# Patient Record
Sex: FEMALE | Race: WHITE | Employment: FULL TIME | URBAN - NONMETROPOLITAN AREA
[De-identification: names, ages, dates, MRNs, and addresses within clinical notes are randomized per-mention and may not be internally consistent; named-entity substitution may affect disease eponyms.]

---

## 2017-01-30 ENCOUNTER — MYC MEDICAL ADVICE (OUTPATIENT)
Dept: FAMILY MEDICINE | Facility: OTHER | Age: 32
End: 2017-01-30

## 2017-01-30 DIAGNOSIS — K25.7 CHRONIC GASTRIC ULCER: ICD-10-CM

## 2017-01-30 DIAGNOSIS — R10.13 ABDOMINAL PAIN, ACUTE, EPIGASTRIC: Primary | ICD-10-CM

## 2017-02-01 ENCOUNTER — TELEPHONE (OUTPATIENT)
Dept: FAMILY MEDICINE | Facility: OTHER | Age: 32
End: 2017-02-01

## 2017-02-01 NOTE — TELEPHONE ENCOUNTER
Left message for patient to return call to schedule EGD/colonoscopy. If Sharri or Jannie are not available, please transfer to same day surgery        Ok to schedule with Ugo

## 2017-02-03 NOTE — TELEPHONE ENCOUNTER
Patient states she is going to wait, she was seeing Dr. Johnston first. She states she will call when she is ready.

## 2017-02-24 ENCOUNTER — OFFICE VISIT (OUTPATIENT)
Dept: FAMILY MEDICINE | Facility: OTHER | Age: 32
End: 2017-02-24
Payer: COMMERCIAL

## 2017-02-24 VITALS
WEIGHT: 156 LBS | HEART RATE: 82 BPM | SYSTOLIC BLOOD PRESSURE: 112 MMHG | BODY MASS INDEX: 25.99 KG/M2 | TEMPERATURE: 98.1 F | DIASTOLIC BLOOD PRESSURE: 72 MMHG | HEIGHT: 65 IN | OXYGEN SATURATION: 100 %

## 2017-02-24 DIAGNOSIS — Z01.818 PREOP GENERAL PHYSICAL EXAM: Primary | ICD-10-CM

## 2017-02-24 DIAGNOSIS — K21.9 GASTROESOPHAGEAL REFLUX DISEASE WITHOUT ESOPHAGITIS: ICD-10-CM

## 2017-02-24 DIAGNOSIS — F32.1 MAJOR DEPRESSIVE DISORDER, SINGLE EPISODE, MODERATE (H): ICD-10-CM

## 2017-02-24 DIAGNOSIS — E03.9 ACQUIRED HYPOTHYROIDISM: ICD-10-CM

## 2017-02-24 DIAGNOSIS — R10.13 ABDOMINAL PAIN, ACUTE, EPIGASTRIC: ICD-10-CM

## 2017-02-24 PROCEDURE — 99213 OFFICE O/P EST LOW 20 MIN: CPT | Performed by: INTERNAL MEDICINE

## 2017-02-24 ASSESSMENT — PAIN SCALES - GENERAL: PAINLEVEL: NO PAIN (0)

## 2017-02-24 NOTE — LETTER
My Depression Action Plan  Name: Lida Blood   Date of Birth 1985  Date: 2/24/2017    My doctor: Jos Dinero   My clinic: [place] [address] [facility] [id number] [phone]          GREEN    ZONE   Good Control    What it looks like:     Things are going generally well. You have normal up s and down s. You may even feel depressed from time to time, but bad moods usually last less than a day.   What you need to do:  1. Continue to care for yourself (see self care plan)  2. Check your depression survival kit and update it as needed  3. Follow your physician s recommendations including any medication.  4. Do not stop taking medication unless you consult with your physician first.           YELLOW         ZONE Getting Worse    What it looks like:     Depression is starting to interfere with your life.     It may be hard to get out of bed; you may be starting to isolate yourself from others.    Symptoms of depression are starting to last most all day and this has happened for several days.     You may have suicidal thoughts but they are not constant.   What you need to do:     1. Call your care team, your response to treatment will improve if you keep your care team informed of your progress. Yellow periods are signs an adjustment may need to be made.     2. Continue your self-care, even if you have to fake it!    3. Talk to someone in your support network    4. Open up your depression survival kit           RED    ZONE Medical Alert - Get Help    What it looks like:     Depression is seriously interfering with your life.     You may experience these or other symptoms: You can t get out of bed most days, can t work or engage in other necessary activities, you have trouble taking care of basic hygiene, or basic responsibilities, thoughts of suicide or death that will not go away, self-injurious behavior.     What you need to do:  1. Call your care team  and request a same-day appointment. If they are not available (weekends or after hours) call your local crisis line, emergency room or 911.      Electronically signed by: Diane Sheikh, February 24, 2017    Depression Self Care Plan / Survival Kit    Self-Care for Depression  Here s the deal. Your body and mind are really not as separate as most people think.  What you do and think affects how you feel and how you feel influences what you do and think. This means if you do things that people who feel good do, it will help you feel better.  Sometimes this is all it takes.  There is also a place for medication and therapy depending on how severe your depression is, so be sure to consult with your medical provider and/ or Behavioral Health Consultant if your symptoms are worsening or not improving.     In order to better manage my stress, I will:    Exercise  Get some form of exercise, every day. This will help reduce pain and release endorphins, the  feel good  chemicals in your brain. This is almost as good as taking antidepressants!  This is not the same as joining a gym and then never going! (they count on that by the way ) It can be as simple as just going for a walk or doing some gardening, anything that will get you moving.      Hygiene   Maintain good hygiene (Get out of bed in the morning, Make your bed, Brush your teeth, Take a shower, and Get dressed like you were going to work, even if you are unemployed).  If your clothes don't fit try to get ones that do.    Diet  I will strive to eat foods that are good for me, drink plenty of water, and avoid excessive sugar, caffeine, alcohol, and other mood-altering substances.  Some foods that are helpful in depression are: complex carbohydrates, B vitamins, flaxseed, fish or fish oil, fresh fruits and vegetables.    Psychotherapy  I agree to participate in Individual Therapy (if recommended).    Medication  If prescribed medications, I agree to take them.  Missing  doses can result in serious side effects.  I understand that drinking alcohol, or other illicit drug use, may cause potential side effects.  I will not stop my medication abruptly without first discussing it with my provider.    Staying Connected With Others  I will stay in touch with my friends, family members, and my primary care provider/team.    Use your imagination  Be creative.  We all have a creative side; it doesn t matter if it s oil painting, sand castles, or mud pies! This will also kick up the endorphins.    Witness Beauty  (AKA stop and smell the roses) Take a look outside, even in mid-winter. Notice colors, textures. Watch the squirrels and birds.     Service to others  Be of service to others.  There is always someone else in need.  By helping others we can  get out of ourselves  and remember the really important things.  This also provides opportunities for practicing all the other parts of the program.    Humor  Laugh and be silly!  Adjust your TV habits for less news and crime-drama and more comedy.    Control your stress  Try breathing deep, massage therapy, biofeedback, and meditation. Find time to relax each day.     My support system    Clinic Contact:  Phone number:    Contact 1:  Phone number:    Contact 2:  Phone number:    [employer]/:  Phone number:    Therapist:  Phone number:    Local crisis center:    Phone number:    Other community support:  Phone number:

## 2017-02-24 NOTE — NURSING NOTE
"Chief Complaint   Patient presents with     Pre-Op Exam       Initial /72 (BP Location: Left arm, Patient Position: Chair, Cuff Size: Adult Regular)  Pulse 82  Temp 98.1  F (36.7  C) (Temporal)  Ht 5' 5.25\" (1.657 m)  Wt 156 lb (70.8 kg)  LMP 09/01/2008  SpO2 100%  BMI 25.76 kg/m2 Estimated body mass index is 25.76 kg/(m^2) as calculated from the following:    Height as of this encounter: 5' 5.25\" (1.657 m).    Weight as of this encounter: 156 lb (70.8 kg).  Medication Reconciliation: complete   Diane VÁSQUEZ      "

## 2017-02-24 NOTE — PROGRESS NOTES
[facility]  [address] [facility] [address]  [phone]  Dept: [phone]    PRE-OP EVALUATION:  Today's date: 2017    Lida Blood (: 1985) presents for pre-operative evaluation assessment as requested by Dr. Johnston.  She requires evaluation and anesthesia risk assessment prior to undergoing surgery/procedure for treatment of EGD .  Proposed procedure: COMBINED ESOPHAGOSCOPY, GASTROSCOPY, DUODENOSCOPY     Date of Surgery/ Procedure: 3/1/17  Time of Surgery/ Procedure: 11am  Hospital/Surgical Facility: [facility]    Primary Physician: Jos Dinero  Type of Anesthesia Anticipated: Local with MAC    Patient has a Health Care Directive or Living Will:  NO    Preop Questions 2017   1.  Do you have a history of heart attack, stroke, stent, bypass or surgery on an artery in the head, neck, heart or legs? No   2.  Do you ever have any pain or discomfort in your chest? No   3.  Do you have a history of  Heart Failure? No   4.   Are you troubled by shortness of breath when:  walking on a level surface, or up a slight hill, or at night? No   5.  Do you currently have a cold, bronchitis or other respiratory infection? No   6.  Do you have a cough, shortness of breath, or wheezing? No   7.  Do you sometimes get pains in the calves of your legs when you walk? No   8. Do you or anyone in your family have previous history of blood clots? No   9.  Do you or does anyone in your family have a serious bleeding problem such as prolonged bleeding following surgeries or cuts? No   10. Have you ever had problems with anemia or been told to take iron pills? No   11. Have you had any abnormal blood loss such as black, tarry or bloody stools, or abnormal vaginal bleeding? YES - has hx of bleeding ulcers   12. Have you ever had a blood transfusion? No   13. Have you or any of your relatives ever had problems with anesthesia? No   14. Do you have sleep apnea, excessive  snoring or daytime drowsiness? No   15. Do you have any prosthetic heart valves? No   16. Do you have prosthetic joints? No   17. Is there any chance that you may be pregnant? No         HPI:                                                      Brief HPI related to upcoming procedure: Patient has some persistent epigastric discomfort which is no longer responding to proton pump inhibitors. She denies any melena or hematochezia. She has decided to pursue EGD and requires preoperative examination prior to undergoing anesthesia.      See problem list for active medical problems.  Problems all longstanding and stable, except as noted/documented.  See ROS for pertinent symptoms related to these conditions.                                                                                                  .    MEDICAL HISTORY:                                                      Patient Active Problem List    Diagnosis Date Noted     Abdominal pain - acute epigastric 05/24/2013     Priority: High     Vomiting and diarrhea 05/23/2013     Priority: High     Abdominal pain, acute, epigastric 04/23/2012     Priority: High     LUQ abdominal pain 08/16/2016     Priority: Medium     Ileus (H) 08/16/2016     Priority: Medium     Dehydration 03/31/2016     Priority: Medium     Obesity 03/15/2016     Priority: Medium     Gastric ulcer 12/02/2015     Priority: Medium     Hypokalemia 12/01/2015     Priority: Medium     Hypoglycemia 12/01/2015     Priority: Medium     Gastroesophageal reflux disease without esophagitis 10/16/2015     Priority: Medium     Endometriosis 10/24/2008     Priority: Medium     Hypothyroidism 09/26/2003     Priority: Medium     Problem list name updated by automated process. Provider to review       RLS (restless legs syndrome) 04/05/2013     Migraine headache 10/14/2011     Physiological ovarian cysts 04/28/2011     CARDIOVASCULAR SCREENING; LDL GOAL LESS THAN 160 10/31/2010     Vaginal discharge 07/15/2009      Surgery follow-up examination 07/15/2009     Major depressive disorder, single episode, moderate (H)      Backache 01/22/2004     Problem list name updated by automated process. Provider to review       Anxiety state 05/08/2002     Problem list name updated by automated process. Provider to review        Past Medical History   Diagnosis Date     Endometriosis of pelvic peritoneum 10/06/08     Endometriosis of uterus 10/06/08     Admit. 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     PONV (postoperative nausea and vomiting)      S/P hysterectomy      Thyrotoxicosis without mention of goiter or other cause, without mention of thyrotoxic crisis or storm 2001     trachelectomy 05/19/09     Past Surgical History   Procedure Laterality Date     Hc create eardrum opening,gen anesth  12/29/88     Bilateral myringotomy with insertion of PE tubes     Hc tympanoplasty w/o mastoid, init/rev w/o oss chain reconst  10/03/00     Left exploratory tympanoplasty, myringoplasty, fascia grafting of the oval window, microdissection via the use of operative microscope     Hc create eardrum opening,gen anesth  12/20/90     Bilateral myringotomy with insertion of PE tubes     C appendectomy  4/18/2003     Hc laparoscopy, surgical, abdomen, peritoneum & omentum; dx w/ or w/o specimen(s)  04/18/2003     Diagnostic laparoscopy     C removal gallbladder  8/1/03     Hernia repair, incisional  08/23/2006     Laparoscopic mesh repair.     Hc colonoscopy w biopsy  1/22/2007     Hysteroscopy  02/08/08     Hc colonoscopy w/wo brush/wash  12/16/04     Hc lap,fulgurate/excise  lesions  06/16/08     Laparoscopy, ablation of the endometriosis and diagnostic cystoscopy     C supracerv abd hysterectomy  10/06/08     C removal of cervix  05/29/09     laparoscopic assisted vaginal trachelectomy     Hysterectomy, pap no longer indicated  5/29/09     Laparoscopic lysis adhesions  11/02/10     [facility]     Laparoscopy diagnostic (general) Right 06/23/11     Evaluation of abdomen/pelvis, RSO, Cysto; [facility] ugi endoscopy, simple exam  3/23/12, 11/13/14     Eye surgery       bilat orbital decompression surgery      Colonoscopy  11/25/14     Esophagoscopy, gastroscopy, duodenoscopy (egd), combined N/A 8/31/2015     Procedure: COMBINED ESOPHAGOSCOPY, GASTROSCOPY, DUODENOSCOPY (EGD), BIOPSY SINGLE OR MULTIPLE;  Surgeon: Toni Wiggins MD;  Location: [facility]     Laparoscopic revision gastroplasty to yara-en-y N/A 10/26/2015     Procedure: LAPAROSCOPIC REVISION GASTROPLASTY TO YARA-EN-Y;  Surgeon: Toni Wiggins MD;  Location: [facility]     Laparoscopic gastrectomy N/A 3/15/2016     Procedure: LAPAROSCOPIC GASTRECTOMY;  Surgeon: Jos Dinero MD;  Location: [facility]     Esophagoscopy, gastroscopy, duodenoscopy (egd), dilatation, combined N/A 4/13/2016     Procedure: COMBINED ESOPHAGOSCOPY, GASTROSCOPY, DUODENOSCOPY (EGD), DILATATION;  Surgeon: Jos Dinero MD;  Location: [facility]     Laparoscopic salpingo-oophorectomy Left 3/2015     Esophagoscopy, gastroscopy, duodenoscopy (egd), combined N/A 8/18/2016     Procedure: COMBINED ESOPHAGOSCOPY, GASTROSCOPY, DUODENOSCOPY (EGD);  Surgeon: Jaswinder Waddell MD;  Location: [facility]     Current Outpatient Prescriptions   Medication Sig Dispense Refill     OMEPRAZOLE PO Take 40 mg by mouth 2 times daily (before meals)       levothyroxine (SYNTHROID, LEVOTHROID) 200 MCG tablet Take 1 tablet (200 mcg) by mouth daily 30 tablet 1     estradiol 0.075 MG/24HR PTWK Place 1 patch onto the skin once a week 12 patch 3      "levothyroxine (SYNTHROID, LEVOTHROID) 75 MCG tablet Take 1 tablet (75 mcg) by mouth daily Take along with 200 mcg tablet daily. 90 tablet 1     CALCIUM PO Take by mouth daily       Cholecalciferol (VITAMIN D PO) Take by mouth daily       escitalopram (LEXAPRO) 20 MG tablet Take 1 tablet (20 mg) by mouth daily 90 tablet 1     [DISCONTINUED] levothyroxine (SYNTHROID, LEVOTHROID) 25 MCG tablet Take 1 tablet (25 mcg) by mouth daily 30 tablet 1     OTC products: None, except as noted above    Allergies   Allergen Reactions     No Known Drug Allergies       Latex Allergy: NO    Social History   Substance Use Topics     Smoking status: Former Smoker     Packs/day: 0.25     Years: 6.00     Types: Cigarettes     Quit date: 6/16/2015     Smokeless tobacco: Never Used      Comment: social     Alcohol use Yes      Comment: occas     History   Drug Use No       REVIEW OF SYSTEMS:                                                    C: NEGATIVE for fever, chills, change in weight  E/M: NEGATIVE for ear, mouth and throat problems  R: NEGATIVE for significant cough or SOB  CV: NEGATIVE for chest pain, palpitations or peripheral edema    EXAM:                                                    /72 (BP Location: Left arm, Patient Position: Chair, Cuff Size: Adult Regular)  Pulse 82  Temp 98.1  F (36.7  C) (Temporal)  Ht 5' 5.25\" (1.657 m)  Wt 156 lb (70.8 kg)  LMP 09/01/2008  SpO2 100%  BMI 25.76 kg/m2  GENERAL APPEARANCE: healthy, alert and no distress  HENT: ear canals and TM's normal and nose and mouth without ulcers or lesions  RESP: lungs clear to auscultation - no rales, rhonchi or wheezes  CV: regular rate and rhythm, normal S1 S2, no S3 or S4 and no murmur, click or rub   ABDOMEN: soft, nontender, no HSM or masses and bowel sounds normal  NEURO: Normal strength and tone, sensory exam grossly normal, mentation intact and speech normal    DIAGNOSTICS:                                                    No labs or EKG " "required for low risk surgery (cataract, skin procedure, breast biopsy, etc)    Recent Labs   Lab Test  11/01/16   1108  08/18/16   1454  08/18/16   0705  08/17/16   0225   03/29/16   1352  04/27/14 12/10/13   HGB  12.2  12.5  11.4*  11.6*   < >  12.6   < >   --    --    PLT  273  194  175  194   < >  436   < >   --    --    INR   --    --    --    --    --   0.95   --   1   --    NA   --    --   138  142   < >  140   < >  141   --    POTASSIUM   --    --   3.8  3.6   < >  3.9   < >  4   --    CR   --    --   0.71  0.76   < >  0.69   < >  0.7   --    A1C   --    --    --    --    --    --    --    --   5.2    < > = values in this interval not displayed.        IMPRESSION:                                                    Reason for surgery/procedure: Persistent epigastric pain requiring EGD  Diagnosis/reason for consult: Perioperative risk assessment in a 31-year-old female with:   1. Preop general physical exam      2. Abdominal pain, acute, epigastric      3. Gastroesophageal reflux disease without esophagitis      4. Acquired hypothyroidism      The proposed surgical procedure is considered LOW risk.    REVISED CARDIAC RISK INDEX  The patient has the following serious cardiovascular risks for perioperative complications such as (MI, PE, VFib and 3  AV Block):  No serious cardiac risks  INTERPRETATION: 0 risks: Class I (very low risk - 0.4% complication rate)    The patient has the following additional risks for perioperative complications:  No identified additional risks    No diagnosis found.    RECOMMENDATIONS:                                                          {IMPORTANT - Medications:[id number]::\"--Patient is to take all scheduled medications after her EGD       APPROVAL GIVEN to proceed with proposed procedure, without further diagnostic evaluation       Signed Electronically by: Eddie Tiwari DO    Copy of this evaluation report is provided to requesting physician.    Penny Preop " Guidelines some extremity

## 2017-02-24 NOTE — MR AVS SNAPSHOT
After Visit Summary   2/24/2017    Lida Blood    MRN: [id number]           Patient Information     Date Of Birth          1985        Visit Information        Provider Department      2/24/2017 8:40 AM Eddie Tiwari DO [facility]        Today's Diagnoses     Preop general physical exam    -  1    Abdominal pain, acute, epigastric        Gastroesophageal reflux disease without esophagitis        Acquired hypothyroidism        Major depressive disorder, single episode, moderate (H)          Care Instructions      Before Your Surgery      Call your surgeon if there is any change in your health. This includes signs of a cold or flu (such as a sore throat, runny nose, cough, rash or fever).    Do not smoke, drink alcohol or take over the counter medicine (unless your surgeon or primary care doctor tells you to) for the 24 hours before and after surgery.    If you take prescribed drugs: Follow your doctor s orders about which medicines to take and which to stop until after surgery.    Eating and drinking prior to surgery: follow the instructions from your surgeon    Take a shower or bath the night before surgery. Use the soap your surgeon gave you to gently clean your skin. If you do not have soap from your surgeon, use your regular soap. Do not shave or scrub the surgery site.  Wear clean pajamas and have clean sheets on your bed.         Follow-ups after your visit        Your next 10 appointments already scheduled     Mar 01, 2017   Procedure with Jan Johnston MD   [facility] ([facility])    [address] [place] [id number] [phone]              Who to contact     If you have questions or need follow up information about today's clinic visit or your schedule please contact [facility] directly at [phone].  Normal or non-critical lab and imaging results will be communicated to you by  "MyChart, letter or phone within 4 business days after the clinic has received the results. If you do not hear from us within 7 days, please contact the clinic through "[employer]t or phone. If you have a critical or abnormal lab result, we will notify you by phone as soon as possible.  Submit refill requests through [employer] or call your pharmacy and they will forward the refill request to us. Please allow 3 business days for your refill to be completed.          Additional Information About Your Visit        [employer] Information     [employer] gives you secure access to your electronic health record. If you see a primary care provider, you can also send messages to your care team and make appointments. If you have questions, please call your primary care clinic.  If you do not have a primary care provider, please call [phone] and they will assist you.        Care EveryWhere ID     This is your Care EveryWhere ID. This could be used by other organizations to access your [facility] medical records  [id number]        Your Vitals Were     Pulse Temperature Height Last Period Pulse Oximetry BMI (Body Mass Index)    82 98.1  F (36.7  C) (Temporal) 5' 5.25\" (1.657 m) 09/01/2008 100% 25.76 kg/m2       Blood Pressure from Last 3 Encounters:   02/24/17 112/72   08/18/16 110/60   08/16/16 104/60    Weight from Last 3 Encounters:   02/24/17 156 lb (70.8 kg)   08/16/16 158 lb 11.7 oz (72 kg)   05/11/16 155 lb (70.3 kg)              We Performed the Following     DEPRESSION ACTION PLAN (DAP)          Today's Medication Changes          These changes are accurate as of: 2/24/17  9:13 AM.  If you have any questions, ask your nurse or doctor.               These medicines have changed or have updated prescriptions.        Dose/Directions    * levothyroxine 75 MCG tablet   Commonly known as:  SYNTHROID/LEVOTHROID   This may have changed:  Another medication with the same name was removed. Continue taking this medication, and follow the " directions you see here.   Used for:  Acquired hypothyroidism   Changed by:  Joaquin Dudley MD        Dose:  75 mcg   Take 1 tablet (75 mcg) by mouth daily Take along with 200 mcg tablet daily.   Quantity:  90 tablet   Refills:  1       * levothyroxine 200 MCG tablet   Commonly known as:  SYNTHROID/LEVOTHROID   This may have changed:  Another medication with the same name was removed. Continue taking this medication, and follow the directions you see here.   Used for:  Acquired hypothyroidism        Dose:  200 mcg   Take 1 tablet (200 mcg) by mouth daily   Quantity:  30 tablet   Refills:  1       * Notice:  This list has 2 medication(s) that are the same as other medications prescribed for you. Read the directions carefully, and ask your doctor or other care provider to review them with you.             Primary Care Provider Office Phone # Fax #    Jos Dinero [phone]        [facility] [address] [facility] [address]  [facility] [address]        Thank you!     Thank you for choosing [facility]  for your care. Our goal is always to provide you with excellent care. Hearing back from our patients is one way we can continue to improve our services. Please take a few minutes to complete the written survey that you may receive in the mail after your visit with us. Thank you!             Your Updated Medication List - Protect others around you: Learn how to safely use, store and throw away your medicines at www.disposemymeds.org.          This list is accurate as of: 2/24/17  9:13 AM.  Always use your most recent med list.                   Brand Name Dispense Instructions for use    CALCIUM PO      Take by mouth daily       escitalopram 20 MG tablet    LEXAPRO    90 tablet    Take 1 tablet (20 mg) by mouth daily       estradiol 0.075 MG/24HR Ptwk     12 patch    Place 1 patch onto the skin once a week       * levothyroxine 75 MCG tablet    SYNTHROID/LEVOTHROID    90 tablet    Take 1  tablet (75 mcg) by mouth daily Take along with 200 mcg tablet daily.       * levothyroxine 200 MCG tablet    SYNTHROID/LEVOTHROID    30 tablet    Take 1 tablet (200 mcg) by mouth daily       OMEPRAZOLE PO      Take 40 mg by mouth 2 times daily (before meals)       VITAMIN D PO      Take by mouth daily       * Notice:  This list has 2 medication(s) that are the same as other medications prescribed for you. Read the directions carefully, and ask your doctor or other care provider to review them with you.

## 2017-02-25 ASSESSMENT — PATIENT HEALTH QUESTIONNAIRE - PHQ9: SUM OF ALL RESPONSES TO PHQ QUESTIONS 1-9: 2

## 2017-02-28 ENCOUNTER — ANESTHESIA EVENT (OUTPATIENT)
Dept: GASTROENTEROLOGY | Facility: CLINIC | Age: 32
End: 2017-02-28
Payer: COMMERCIAL

## 2017-02-28 ASSESSMENT — LIFESTYLE VARIABLES: TOBACCO_USE: 1

## 2017-02-28 NOTE — H&P (VIEW-ONLY)
[facility]  [address] [facility] [address]  [phone]  Dept: [phone]    PRE-OP EVALUATION:  Today's date: 2017    Lida Blood (: 1985) presents for pre-operative evaluation assessment as requested by Dr. Johnston.  She requires evaluation and anesthesia risk assessment prior to undergoing surgery/procedure for treatment of EGD .  Proposed procedure: COMBINED ESOPHAGOSCOPY, GASTROSCOPY, DUODENOSCOPY     Date of Surgery/ Procedure: 3/1/17  Time of Surgery/ Procedure: 11am  Hospital/Surgical Facility: [facility]    Primary Physician: Jos Dinero  Type of Anesthesia Anticipated: Local with MAC    Patient has a Health Care Directive or Living Will:  NO    Preop Questions 2017   1.  Do you have a history of heart attack, stroke, stent, bypass or surgery on an artery in the head, neck, heart or legs? No   2.  Do you ever have any pain or discomfort in your chest? No   3.  Do you have a history of  Heart Failure? No   4.   Are you troubled by shortness of breath when:  walking on a level surface, or up a slight hill, or at night? No   5.  Do you currently have a cold, bronchitis or other respiratory infection? No   6.  Do you have a cough, shortness of breath, or wheezing? No   7.  Do you sometimes get pains in the calves of your legs when you walk? No   8. Do you or anyone in your family have previous history of blood clots? No   9.  Do you or does anyone in your family have a serious bleeding problem such as prolonged bleeding following surgeries or cuts? No   10. Have you ever had problems with anemia or been told to take iron pills? No   11. Have you had any abnormal blood loss such as black, tarry or bloody stools, or abnormal vaginal bleeding? YES - has hx of bleeding ulcers   12. Have you ever had a blood transfusion? No   13. Have you or any of your relatives ever had problems with anesthesia? No   14. Do you have sleep apnea, excessive  snoring or daytime drowsiness? No   15. Do you have any prosthetic heart valves? No   16. Do you have prosthetic joints? No   17. Is there any chance that you may be pregnant? No         HPI:                                                      Brief HPI related to upcoming procedure: Patient has some persistent epigastric discomfort which is no longer responding to proton pump inhibitors. She denies any melena or hematochezia. She has decided to pursue EGD and requires preoperative examination prior to undergoing anesthesia.      See problem list for active medical problems.  Problems all longstanding and stable, except as noted/documented.  See ROS for pertinent symptoms related to these conditions.                                                                                                  .    MEDICAL HISTORY:                                                      Patient Active Problem List    Diagnosis Date Noted     Abdominal pain - acute epigastric 05/24/2013     Priority: High     Vomiting and diarrhea 05/23/2013     Priority: High     Abdominal pain, acute, epigastric 04/23/2012     Priority: High     LUQ abdominal pain 08/16/2016     Priority: Medium     Ileus (H) 08/16/2016     Priority: Medium     Dehydration 03/31/2016     Priority: Medium     Obesity 03/15/2016     Priority: Medium     Gastric ulcer 12/02/2015     Priority: Medium     Hypokalemia 12/01/2015     Priority: Medium     Hypoglycemia 12/01/2015     Priority: Medium     Gastroesophageal reflux disease without esophagitis 10/16/2015     Priority: Medium     Endometriosis 10/24/2008     Priority: Medium     Hypothyroidism 09/26/2003     Priority: Medium     Problem list name updated by automated process. Provider to review       RLS (restless legs syndrome) 04/05/2013     Migraine headache 10/14/2011     Physiological ovarian cysts 04/28/2011     CARDIOVASCULAR SCREENING; LDL GOAL LESS THAN 160 10/31/2010     Vaginal discharge 07/15/2009      Surgery follow-up examination 07/15/2009     Major depressive disorder, single episode, moderate (H)      Backache 01/22/2004     Problem list name updated by automated process. Provider to review       Anxiety state 05/08/2002     Problem list name updated by automated process. Provider to review        Past Medical History   Diagnosis Date     Endometriosis of pelvic peritoneum 10/06/08     Endometriosis of uterus 10/06/08     Admit. 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     s/p radioactive iodine ablation Rx     Major depressive disorder, single episode, moderate (H)      Migraine headache 10/14/2011     Obesity (BMI 30-39.9)      Other and unspecified ovarian cyst 03/02/10     d/c 03/04/10     Pain pelvic 12/14/04     Discharged 12/17/04-[facility]     PONV (postoperative nausea and vomiting)      S/P hysterectomy      Thyrotoxicosis without mention of goiter or other cause, without mention of thyrotoxic crisis or storm 2001     trachelectomy 05/19/09     Past Surgical History   Procedure Laterality Date     Hc create eardrum opening,gen anesth  12/29/88     Bilateral myringotomy with insertion of PE tubes     Hc tympanoplasty w/o mastoid, init/rev w/o oss chain reconst  10/03/00     Left exploratory tympanoplasty, myringoplasty, fascia grafting of the oval window, microdissection via the use of operative microscope     Hc create eardrum opening,gen anesth  12/20/90     Bilateral myringotomy with insertion of PE tubes     C appendectomy  4/18/2003     Hc laparoscopy, surgical, abdomen, peritoneum & omentum; dx w/ or w/o specimen(s)  04/18/2003     Diagnostic laparoscopy     C removal gallbladder  8/1/03     Hernia repair, incisional  08/23/2006     Laparoscopic mesh repair.     Hc colonoscopy w biopsy  1/22/2007     Hysteroscopy  02/08/08     Hc colonoscopy w/wo brush/wash  12/16/04     Hc lap,fulgurate/excise  lesions  06/16/08     Laparoscopy, ablation of the endometriosis and diagnostic cystoscopy     C supracerv abd hysterectomy  10/06/08     C removal of cervix  05/29/09     laparoscopic assisted vaginal trachelectomy     Hysterectomy, pap no longer indicated  5/29/09     Laparoscopic lysis adhesions  11/02/10     [facility]     Laparoscopy diagnostic (general) Right 06/23/11     Evaluation of abdomen/pelvis, RSO, Cysto; [facility] ugi endoscopy, simple exam  3/23/12, 11/13/14     Eye surgery       bilat orbital decompression surgery      Colonoscopy  11/25/14     Esophagoscopy, gastroscopy, duodenoscopy (egd), combined N/A 8/31/2015     Procedure: COMBINED ESOPHAGOSCOPY, GASTROSCOPY, DUODENOSCOPY (EGD), BIOPSY SINGLE OR MULTIPLE;  Surgeon: Toni Wiggins MD;  Location: [facility]     Laparoscopic revision gastroplasty to yara-en-y N/A 10/26/2015     Procedure: LAPAROSCOPIC REVISION GASTROPLASTY TO YARA-EN-Y;  Surgeon: Toni Wiggins MD;  Location: [facility]     Laparoscopic gastrectomy N/A 3/15/2016     Procedure: LAPAROSCOPIC GASTRECTOMY;  Surgeon: Jos Dinero MD;  Location: [facility]     Esophagoscopy, gastroscopy, duodenoscopy (egd), dilatation, combined N/A 4/13/2016     Procedure: COMBINED ESOPHAGOSCOPY, GASTROSCOPY, DUODENOSCOPY (EGD), DILATATION;  Surgeon: Jos Dinero MD;  Location: [facility]     Laparoscopic salpingo-oophorectomy Left 3/2015     Esophagoscopy, gastroscopy, duodenoscopy (egd), combined N/A 8/18/2016     Procedure: COMBINED ESOPHAGOSCOPY, GASTROSCOPY, DUODENOSCOPY (EGD);  Surgeon: Jaswinder Waddell MD;  Location: [facility]     Current Outpatient Prescriptions   Medication Sig Dispense Refill     OMEPRAZOLE PO Take 40 mg by mouth 2 times daily (before meals)       levothyroxine (SYNTHROID, LEVOTHROID) 200 MCG tablet Take 1 tablet (200 mcg) by mouth daily 30 tablet 1     estradiol 0.075 MG/24HR PTWK Place 1 patch onto the skin once a week 12 patch 3      "levothyroxine (SYNTHROID, LEVOTHROID) 75 MCG tablet Take 1 tablet (75 mcg) by mouth daily Take along with 200 mcg tablet daily. 90 tablet 1     CALCIUM PO Take by mouth daily       Cholecalciferol (VITAMIN D PO) Take by mouth daily       escitalopram (LEXAPRO) 20 MG tablet Take 1 tablet (20 mg) by mouth daily 90 tablet 1     [DISCONTINUED] levothyroxine (SYNTHROID, LEVOTHROID) 25 MCG tablet Take 1 tablet (25 mcg) by mouth daily 30 tablet 1     OTC products: None, except as noted above    Allergies   Allergen Reactions     No Known Drug Allergies       Latex Allergy: NO    Social History   Substance Use Topics     Smoking status: Former Smoker     Packs/day: 0.25     Years: 6.00     Types: Cigarettes     Quit date: 6/16/2015     Smokeless tobacco: Never Used      Comment: social     Alcohol use Yes      Comment: occas     History   Drug Use No       REVIEW OF SYSTEMS:                                                    C: NEGATIVE for fever, chills, change in weight  E/M: NEGATIVE for ear, mouth and throat problems  R: NEGATIVE for significant cough or SOB  CV: NEGATIVE for chest pain, palpitations or peripheral edema    EXAM:                                                    /72 (BP Location: Left arm, Patient Position: Chair, Cuff Size: Adult Regular)  Pulse 82  Temp 98.1  F (36.7  C) (Temporal)  Ht 5' 5.25\" (1.657 m)  Wt 156 lb (70.8 kg)  LMP 09/01/2008  SpO2 100%  BMI 25.76 kg/m2  GENERAL APPEARANCE: healthy, alert and no distress  HENT: ear canals and TM's normal and nose and mouth without ulcers or lesions  RESP: lungs clear to auscultation - no rales, rhonchi or wheezes  CV: regular rate and rhythm, normal S1 S2, no S3 or S4 and no murmur, click or rub   ABDOMEN: soft, nontender, no HSM or masses and bowel sounds normal  NEURO: Normal strength and tone, sensory exam grossly normal, mentation intact and speech normal    DIAGNOSTICS:                                                    No labs or EKG " "required for low risk surgery (cataract, skin procedure, breast biopsy, etc)    Recent Labs   Lab Test  11/01/16   1108  08/18/16   1454  08/18/16   0705  08/17/16   0225   03/29/16   1352  04/27/14 12/10/13   HGB  12.2  12.5  11.4*  11.6*   < >  12.6   < >   --    --    PLT  273  194  175  194   < >  436   < >   --    --    INR   --    --    --    --    --   0.95   --   1   --    NA   --    --   138  142   < >  140   < >  141   --    POTASSIUM   --    --   3.8  3.6   < >  3.9   < >  4   --    CR   --    --   0.71  0.76   < >  0.69   < >  0.7   --    A1C   --    --    --    --    --    --    --    --   5.2    < > = values in this interval not displayed.        IMPRESSION:                                                    Reason for surgery/procedure: Persistent epigastric pain requiring EGD  Diagnosis/reason for consult: Perioperative risk assessment in a 31-year-old female with:   1. Preop general physical exam      2. Abdominal pain, acute, epigastric      3. Gastroesophageal reflux disease without esophagitis      4. Acquired hypothyroidism      The proposed surgical procedure is considered LOW risk.    REVISED CARDIAC RISK INDEX  The patient has the following serious cardiovascular risks for perioperative complications such as (MI, PE, VFib and 3  AV Block):  No serious cardiac risks  INTERPRETATION: 0 risks: Class I (very low risk - 0.4% complication rate)    The patient has the following additional risks for perioperative complications:  No identified additional risks    No diagnosis found.    RECOMMENDATIONS:                                                          {IMPORTANT - Medications:[id number]::\"--Patient is to take all scheduled medications after her EGD       APPROVAL GIVEN to proceed with proposed procedure, without further diagnostic evaluation       Signed Electronically by: Eddie Tiwari DO    Copy of this evaluation report is provided to requesting physician.    Penny Preop " Guidelines some extremity

## 2017-03-01 ENCOUNTER — HOSPITAL ENCOUNTER (OUTPATIENT)
Facility: CLINIC | Age: 32
Discharge: HOME | End: 2017-03-01
Attending: SPECIALIST | Admitting: SPECIALIST
Payer: COMMERCIAL

## 2017-03-01 ENCOUNTER — ANESTHESIA (OUTPATIENT)
Dept: GASTROENTEROLOGY | Facility: CLINIC | Age: 32
End: 2017-03-01
Payer: COMMERCIAL

## 2017-03-01 VITALS
TEMPERATURE: 98 F | RESPIRATION RATE: 16 BRPM | SYSTOLIC BLOOD PRESSURE: 107 MMHG | OXYGEN SATURATION: 98 % | DIASTOLIC BLOOD PRESSURE: 70 MMHG

## 2017-03-01 DIAGNOSIS — R10.13 EPIGASTRIC PAIN: Primary | ICD-10-CM

## 2017-03-01 LAB — UPPER GI ENDOSCOPY: NORMAL

## 2017-03-01 PROCEDURE — 25000128 H RX IP 250 OP 636: Performed by: NURSE ANESTHETIST, CERTIFIED REGISTERED

## 2017-03-01 PROCEDURE — 99222 1ST HOSP IP/OBS MODERATE 55: CPT | Performed by: SPECIALIST

## 2017-03-01 PROCEDURE — 88342 IMHCHEM/IMCYTCHM 1ST ANTB: CPT | Mod: 26 | Performed by: SPECIALIST

## 2017-03-01 PROCEDURE — 25000125 ZZHC RX 250: Performed by: NURSE ANESTHETIST, CERTIFIED REGISTERED

## 2017-03-01 PROCEDURE — 25800025 ZZH RX 258: Performed by: NURSE ANESTHETIST, CERTIFIED REGISTERED

## 2017-03-01 PROCEDURE — 43239 EGD BIOPSY SINGLE/MULTIPLE: CPT | Performed by: SPECIALIST

## 2017-03-01 PROCEDURE — 88305 TISSUE EXAM BY PATHOLOGIST: CPT | Mod: 26 | Performed by: SPECIALIST

## 2017-03-01 PROCEDURE — 88342 IMHCHEM/IMCYTCHM 1ST ANTB: CPT | Performed by: SPECIALIST

## 2017-03-01 PROCEDURE — 40000296 ZZH STATISTIC ENDO RECOVERY CLASS 1:2 FIRST HOUR: Performed by: SPECIALIST

## 2017-03-01 PROCEDURE — 88305 TISSUE EXAM BY PATHOLOGIST: CPT | Performed by: SPECIALIST

## 2017-03-01 RX ORDER — SODIUM CHLORIDE, SODIUM LACTATE, POTASSIUM CHLORIDE, CALCIUM CHLORIDE 600; 310; 30; 20 MG/100ML; MG/100ML; MG/100ML; MG/100ML
INJECTION, SOLUTION INTRAVENOUS CONTINUOUS
Status: DISCONTINUED | OUTPATIENT
Start: 2017-03-01 | End: 2017-03-01 | Stop reason: HOSPADM

## 2017-03-01 RX ORDER — ONDANSETRON 2 MG/ML
4 INJECTION INTRAMUSCULAR; INTRAVENOUS EVERY 6 HOURS PRN
Status: DISCONTINUED | OUTPATIENT
Start: 2017-03-01 | End: 2017-03-01 | Stop reason: HOSPADM

## 2017-03-01 RX ORDER — PROPOFOL 10 MG/ML
INJECTION, EMULSION INTRAVENOUS PRN
Status: DISCONTINUED | OUTPATIENT
Start: 2017-03-01 | End: 2017-03-01

## 2017-03-01 RX ORDER — SODIUM CHLORIDE 9 MG/ML
INJECTION, SOLUTION INTRAVENOUS CONTINUOUS
Status: DISCONTINUED | OUTPATIENT
Start: 2017-03-01 | End: 2017-03-01 | Stop reason: HOSPADM

## 2017-03-01 RX ORDER — FLUMAZENIL 0.1 MG/ML
0.2 INJECTION, SOLUTION INTRAVENOUS
Status: DISCONTINUED | OUTPATIENT
Start: 2017-03-01 | End: 2017-03-01 | Stop reason: HOSPADM

## 2017-03-01 RX ORDER — NALOXONE HYDROCHLORIDE 0.4 MG/ML
.1-.4 INJECTION, SOLUTION INTRAMUSCULAR; INTRAVENOUS; SUBCUTANEOUS
Status: DISCONTINUED | OUTPATIENT
Start: 2017-03-01 | End: 2017-03-01 | Stop reason: HOSPADM

## 2017-03-01 RX ORDER — LIDOCAINE 40 MG/G
CREAM TOPICAL
Status: DISCONTINUED | OUTPATIENT
Start: 2017-03-01 | End: 2017-03-01 | Stop reason: HOSPADM

## 2017-03-01 RX ORDER — LIDOCAINE HYDROCHLORIDE 20 MG/ML
INJECTION, SOLUTION INFILTRATION; PERINEURAL PRN
Status: DISCONTINUED | OUTPATIENT
Start: 2017-03-01 | End: 2017-03-01

## 2017-03-01 RX ORDER — ONDANSETRON 4 MG/1
4 TABLET, ORALLY DISINTEGRATING ORAL EVERY 6 HOURS PRN
Status: DISCONTINUED | OUTPATIENT
Start: 2017-03-01 | End: 2017-03-01 | Stop reason: HOSPADM

## 2017-03-01 RX ORDER — SUCRALFATE ORAL 1 G/10ML
1 SUSPENSION ORAL 4 TIMES DAILY
Qty: 420 ML | Refills: 1 | Status: SHIPPED | OUTPATIENT
Start: 2017-03-01 | End: 2017-05-24

## 2017-03-01 RX ADMIN — SODIUM CHLORIDE, POTASSIUM CHLORIDE, SODIUM LACTATE AND CALCIUM CHLORIDE: 600; 310; 30; 20 INJECTION, SOLUTION INTRAVENOUS at 11:00

## 2017-03-01 RX ADMIN — PROPOFOL 30 MG: 10 INJECTION, EMULSION INTRAVENOUS at 11:50

## 2017-03-01 RX ADMIN — LIDOCAINE HYDROCHLORIDE 1 ML: 10 INJECTION, SOLUTION EPIDURAL; INFILTRATION; INTRACAUDAL; PERINEURAL at 11:01

## 2017-03-01 RX ADMIN — LIDOCAINE HYDROCHLORIDE 40 MG: 20 INJECTION, SOLUTION INFILTRATION; PERINEURAL at 11:45

## 2017-03-01 RX ADMIN — SODIUM CHLORIDE, POTASSIUM CHLORIDE, SODIUM LACTATE AND CALCIUM CHLORIDE: 600; 310; 30; 20 INJECTION, SOLUTION INTRAVENOUS at 12:05

## 2017-03-01 RX ADMIN — MIDAZOLAM HYDROCHLORIDE 2 MG: 1 INJECTION, SOLUTION INTRAMUSCULAR; INTRAVENOUS at 11:42

## 2017-03-01 RX ADMIN — PROPOFOL 20 MG: 10 INJECTION, EMULSION INTRAVENOUS at 11:48

## 2017-03-01 RX ADMIN — PROPOFOL 50 MG: 10 INJECTION, EMULSION INTRAVENOUS at 11:45

## 2017-03-01 NOTE — ANESTHESIA CARE TRANSFER NOTE
Patient: Lida lBood    Procedure(s):  ESOPHAGOSCOPY, GASTROSCOPY, DUODENOSCOPY (EGD) with biopsy by biopsy - Wound Class: II-Clean Contaminated    Diagnosis: reflux, abdominal pain, follow up ulcers  Diagnosis Additional Information: No value filed.    Anesthesia Type:   MAC     Note:  Airway :Face Mask  Patient transferred to:Phase II        Vitals: (Last set prior to Anesthesia Care Transfer)    CRNA VITALS  3/1/2017 1141 - 3/1/2017 1219      3/1/2017             Pulse: 82    SpO2: 93 %                Electronically Signed By: KATE Mckeon CRNA  March 1, 2017  12:19 PM

## 2017-03-01 NOTE — DISCHARGE INSTRUCTIONS
Endoscopy Discharge Instructions  Preston Barker    The procedure you have just completed was: Esophagoscopy, Gastroscopy, Duodenoscopy    TO AVOID COMPLICATIONS, TAKE SPECIAL NOTE THE ITEMS BELOW.            Call  at the [place] Office:  [phone]  if:     ? Any bleeding exceeding one tablespoon occurs.     ? If any unusual chest or abdominal pain develops.     ? You develop a fever of 101   or more.     ? You develop shortness of breath.     ? You have any further questions.      Diet: Regular     Medications: Resume Home Medications      You have a return appointment on ***       [facility]   Adult Discharge Orders & Instructions     For 24 hours after surgery    1. Get plenty of rest.  A responsible adult must stay with you for at least 24 hours after you leave the hospital.   2. Do not drive or use heavy equipment.  If you have weakness or tingling, don't drive or use heavy equipment until this feeling goes away.  3. Do not drink alcohol.  4. Avoid strenuous or risky activities.  Ask for help when climbing stairs.   5. You may feel lightheaded.  IF so, sit for a few minutes before standing.  Have someone help you get up.   6. If you have nausea (feel sick to your stomach): Drink only clear liquids such as apple juice, ginger ale, broth or 7-Up.  Rest may also help.  Be sure to drink enough fluids.  Move to a regular diet as you feel able.  7. You may have a slight fever. Call the doctor if your fever is over 100 F (37.7 C) (taken under the tongue) or lasts longer than 24 hours.  8. You may have a dry mouth, a sore throat, muscle aches or trouble sleeping.  These should go away after 24 hours.  9. Do not make important or legal decisions.   Call your doctor for any of the followin.  Signs of infection (fever, growing tenderness at the surgery site, a large amount of drainage or bleeding, severe pain, foul-smelling drainage, redness, swelling).    2. It has been over 8 to  10 hours since surgery and you are still not able to urinate (pass water).    3.  Headache for over 24 hours.    4.  Numbness, tingling or weakness the day after surgery (if you had spinal anesthesia).          Signed:__________________________ Signed: ________________________ 3/1/2017   (Patient or responsible party)    (Witness)      _________________________________________________________  Physician Signature     3/1/2017 12:24 PM

## 2017-03-01 NOTE — IP AVS SNAPSHOT
MRN:[id number]                      After Visit Summary   3/1/2017    Lida Blood    MRN: [id number]           Thank you!     Thank you for choosing [facility] for your care. Our goal is always to provide you with excellent care. Hearing back from our patients is one way we can continue to improve our services. Please take a few minutes to complete the written survey that you may receive in the mail after you visit with us. Thank you!        Patient Information     Date Of Birth          1985        About your hospital stay     You were admitted on:  March 1, 2017 You last received care in the:  [facility]    You were discharged on:  March 1, 2017       Who to Call     For medical emergencies, please call 911.  For non-urgent questions about your medical care, please call your primary care provider or clinic, [phone]  For questions related to your surgery, please call your surgery clinic        Attending Provider     Provider Specialty    Jan Johnston MD General Surgery       Primary Care Provider Office Phone # Fax #    Jos Dinero [phone]        [facility] [address] [place] [address]        After Care Instructions     Discharge Instructions       Resume pre procedure diet            Discharge Instructions       Restart home medications.                  Your next 10 appointments already scheduled     Mar 13, 2017 10:15 AM CDT   [facility] with Jan [facility]   [facility]    [address] [place] [id number] [phone]              Further instructions from your care team           Endoscopy Discharge Instructions  Preston Barker    The procedure you have just completed was: Esophagoscopy, Gastroscopy, Duodenoscopy    TO AVOID COMPLICATIONS, TAKE SPECIAL NOTE THE ITEMS BELOW.            Call  at the [place] Office:  [phone]   if:     ? Any bleeding exceeding one tablespoon occurs.     ? If any unusual chest or abdominal pain develops.     ? You develop a fever of 101   or more.     ? You develop shortness of breath.     ? You have any further questions.      Diet: Regular     Medications: Resume Home Medications      You have a return appointment on ***       [facility]   Adult Discharge Orders & Instructions     For 24 hours after surgery    1. Get plenty of rest.  A responsible adult must stay with you for at least 24 hours after you leave the hospital.   2. Do not drive or use heavy equipment.  If you have weakness or tingling, don't drive or use heavy equipment until this feeling goes away.  3. Do not drink alcohol.  4. Avoid strenuous or risky activities.  Ask for help when climbing stairs.   5. You may feel lightheaded.  IF so, sit for a few minutes before standing.  Have someone help you get up.   6. If you have nausea (feel sick to your stomach): Drink only clear liquids such as apple juice, ginger ale, broth or 7-Up.  Rest may also help.  Be sure to drink enough fluids.  Move to a regular diet as you feel able.  7. You may have a slight fever. Call the doctor if your fever is over 100 F (37.7 C) (taken under the tongue) or lasts longer than 24 hours.  8. You may have a dry mouth, a sore throat, muscle aches or trouble sleeping.  These should go away after 24 hours.  9. Do not make important or legal decisions.   Call your doctor for any of the followin.  Signs of infection (fever, growing tenderness at the surgery site, a large amount of drainage or bleeding, severe pain, foul-smelling drainage, redness, swelling).    2. It has been over 8 to 10 hours since surgery and you are still not able to urinate (pass water).    3.  Headache for over 24 hours.    4.  Numbness, tingling or weakness the day after surgery (if you had spinal anesthesia).          Signed:__________________________ Signed:  ________________________ 3/1/2017   (Patient or responsible party)    (Witness)      _________________________________________________________  Physician Signature     3/1/2017 12:24 PM           Pending Results     Date and Time Order Name Status Description    3/1/2017 1156 Surgical pathology exam In process             Admission Information     Date & Time Provider Department Dept. Phone    3/1/2017 Jan Johnston MD [facility] [phone]      Your Vitals Were     Blood Pressure Temperature Respirations Last Period Pulse Oximetry       105/66 98  F (36.7  C) (Oral) 16 09/01/2008 96%       MyChart Information     [facility] gives you secure access to your electronic health record. If you see a primary care provider, you can also send messages to your care team and make appointments. If you have questions, please call your primary care clinic.  If you do not have a primary care provider, please call [phone] and they will assist you.        Care EveryWhere ID     This is your Care EveryWhere ID. This could be used by other organizations to access your [facility] medical records  [id number]           Review of your medicines      START taking        Dose / Directions    sucralfate 1 GM/10ML suspension   Commonly known as:  CARAFATE   Used for:  Epigastric pain        Dose:  1 g   Take 10 mLs (1 g) by mouth 4 times daily   Quantity:  420 mL   Refills:  1         CONTINUE these medicines which have NOT CHANGED        Dose / Directions    CALCIUM PO        Take by mouth daily   Refills:  0       escitalopram 20 MG tablet   Commonly known as:  LEXAPRO   Used for:  Major depressive disorder, single episode, moderate (H)        Dose:  20 mg   Take 1 tablet (20 mg) by mouth daily   Quantity:  90 tablet   Refills:  1       estradiol 0.075 MG/24HR Ptwk   Used for:  Symptomatic menopausal or female climacteric states        Dose:  1 patch   Place 1 patch onto the skin once a week   Quantity:  12 patch    Refills:  3       * levothyroxine 75 MCG tablet   Commonly known as:  SYNTHROID/LEVOTHROID   Used for:  Acquired hypothyroidism        Dose:  75 mcg   Take 1 tablet (75 mcg) by mouth daily Take along with 200 mcg tablet daily.   Quantity:  90 tablet   Refills:  1       * levothyroxine 200 MCG tablet   Commonly known as:  SYNTHROID/LEVOTHROID   Used for:  Acquired hypothyroidism        Dose:  200 mcg   Take 1 tablet (200 mcg) by mouth daily   Quantity:  30 tablet   Refills:  1       OMEPRAZOLE PO        Dose:  40 mg   Take 40 mg by mouth 2 times daily (before meals)   Refills:  0       VITAMIN D PO        Take by mouth daily   Refills:  0       * Notice:  This list has 2 medication(s) that are the same as other medications prescribed for you. Read the directions carefully, and ask your doctor or other care provider to review them with you.         Where to get your medicines      Some of these will need a paper prescription and others can be bought over the counter. Ask your nurse if you have questions.     Bring a paper prescription for each of these medications     sucralfate 1 GM/10ML suspension                Protect others around you: Learn how to safely use, store and throw away your medicines at www.disposemymeds.org.             Medication List: This is a list of all your medications and when to take them. Check marks below indicate your daily home schedule. Keep this list as a reference.      Medications           Morning Afternoon Evening Bedtime As Needed    CALCIUM PO   Take by mouth daily                                escitalopram 20 MG tablet   Commonly known as:  LEXAPRO   Take 1 tablet (20 mg) by mouth daily                                estradiol 0.075 MG/24HR Ptwk   Place 1 patch onto the skin once a week                                * levothyroxine 75 MCG tablet   Commonly known as:  SYNTHROID/LEVOTHROID   Take 1 tablet (75 mcg) by mouth daily Take along with 200 mcg tablet daily.                                 * levothyroxine 200 MCG tablet   Commonly known as:  SYNTHROID/LEVOTHROID   Take 1 tablet (200 mcg) by mouth daily                                OMEPRAZOLE PO   Take 40 mg by mouth 2 times daily (before meals)                                sucralfate 1 GM/10ML suspension   Commonly known as:  CARAFATE   Take 10 mLs (1 g) by mouth 4 times daily                                VITAMIN D PO   Take by mouth daily                                * Notice:  This list has 2 medication(s) that are the same as other medications prescribed for you. Read the directions carefully, and ask your doctor or other care provider to review them with you.

## 2017-03-01 NOTE — IP AVS SNAPSHOT
[facility]    [address] [address]    Phone:  [phone]                                       After Visit Summary   3/1/2017    Lida Blood    MRN: [id number]           After Visit Summary Signature Page     I have received my discharge instructions, and my questions have been answered. I have discussed any challenges I see with this plan with the nurse or doctor.    ..........................................................................................................................................  Patient/Patient Representative Signature      ..........................................................................................................................................  Patient Representative Print Name and Relationship to Patient    ..................................................               ................................................  Date                                            Time    ..........................................................................................................................................  Reviewed by Signature/Title    ...................................................              ..............................................  Date                                                            Time

## 2017-03-01 NOTE — ANESTHESIA POSTPROCEDURE EVALUATION
Patient: Lida Blood    Procedure(s):  ESOPHAGOSCOPY, GASTROSCOPY, DUODENOSCOPY (EGD) with biopsy by biopsy - Wound Class: II-Clean Contaminated    Diagnosis:reflux, abdominal pain, follow up ulcers  Diagnosis Additional Information: No value filed.    Anesthesia Type:  MAC    Note:  Anesthesia Post Evaluation    Patient location during evaluation: Phase 2  Patient participation: Able to fully participate in evaluation  Level of consciousness: awake and alert  Pain management: adequate  Airway patency: patent  Cardiovascular status: acceptable  Respiratory status: acceptable  Hydration status: acceptable  PONV: none     Anesthetic complications: None          Last vitals:  Vitals:    03/01/17 1015   BP: 111/88   Resp: 18   Temp: 98  F (36.7  C)   SpO2: 100%         Electronically Signed By: KATE Mckeon CRNA  March 1, 2017  12:19 PM

## 2017-03-01 NOTE — ADDENDUM NOTE
Addendum  created 03/01/17 1417 by Nakia Mackay APRN CRNA    Anesthesia Review and Sign - Ready for Procedure

## 2017-03-01 NOTE — H&P
[facility] History and Physical    Lida Blood MRN# [id number]   Age: 31 year old YOB: 1985     Date of Admission:  3/1/2017    Home clinic: [facility]  Primary care provider: Jos Dinero          Impression and Plan:   Impression:   Abdominal pain, epigastric of unclear etiology        Plan:   EGD - the procedure, risks, benefits and alternatives were discussed and she agrees to proceed           Chief Complaint:   Abdominal pain, epigastric x 1 year    History is obtained from the patient          History of Present Illness:   This 31 year old female with a hx of a yara en Y gastric bypass who developed and ulcer on afferent stump several years ago.  There was associated epigastric pain.  The pain resolved after revision of the stump The pain then returned 1 year ago.  It is worse early in the am and there has been no improvement with a PPI.  She saw her bariatric surgeon who recommended she have a repeat EGD.   Denies any nausea, vomiting, fever or chills.  She now presents for an EGD.           Past Medical History:     Past Medical History   Diagnosis Date     Endometriosis of pelvic peritoneum 10/06/08     Endometriosis of uterus 10/06/08     Admit. 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     PONV (postoperative nausea and vomiting)      S/P hysterectomy      Thyrotoxicosis without mention of goiter or other cause, without mention of thyrotoxic crisis or storm 2001     trachelectomy 05/19/09            Past Surgical History:     Past Surgical History   Procedure Laterality Date      Hc create eardrum opening,gen anesth  12/29/88     Bilateral myringotomy with insertion of PE tubes     Hc tympanoplasty w/o mastoid, init/rev w/o oss chain reconst  10/03/00     Left exploratory tympanoplasty, myringoplasty, fascia grafting of the oval window, microdissection via the use of operative microscope     Hc create eardrum opening,gen anesth  12/20/90     Bilateral myringotomy with insertion of PE tubes     C appendectomy  4/18/2003      laparoscopy, surgical, abdomen, peritoneum & omentum; dx w/ or w/o specimen(s)  04/18/2003     Diagnostic laparoscopy     C removal gallbladder  8/1/03     Hernia repair, incisional  08/23/2006     Laparoscopic mesh repair.      colonoscopy w biopsy  1/22/2007     Hysteroscopy  02/08/08     Hc colonoscopy w/wo brush/wash  12/16/04      lap,fulgurate/excise lesions  06/16/08     Laparoscopy, ablation of the endometriosis and diagnostic cystoscopy     C supracerv abd hysterectomy  10/06/08     C removal of cervix  05/29/09     laparoscopic assisted vaginal trachelectomy     Hysterectomy, pap no longer indicated  5/29/09     Laparoscopic lysis adhesions  11/02/10     [facility]     Laparoscopy diagnostic (general) Right 06/23/11     Evaluation of abdomen/pelvis, RSO, Cysto; [facility] ugi endoscopy, simple exam  3/23/12, 11/13/14     Eye surgery       bilat orbital decompression surgery      Colonoscopy  11/25/14     Esophagoscopy, gastroscopy, duodenoscopy (egd), combined N/A 8/31/2015     Procedure: COMBINED ESOPHAGOSCOPY, GASTROSCOPY, DUODENOSCOPY (EGD), BIOPSY SINGLE OR MULTIPLE;  Surgeon: Toni Wiggins MD;  Location:  GI     Laparoscopic revision gastroplasty to yara-en-y N/A 10/26/2015     Procedure: LAPAROSCOPIC REVISION GASTROPLASTY TO YARA-EN-Y;  Surgeon: Toni Wiggins MD;  Location:  OR     Laparoscopic gastrectomy N/A 3/15/2016     Procedure: LAPAROSCOPIC GASTRECTOMY;  Surgeon: Jos Dinero MD;  Location:   OR     Esophagoscopy, gastroscopy, duodenoscopy (egd), dilatation, combined N/A 4/13/2016     Procedure: COMBINED ESOPHAGOSCOPY, GASTROSCOPY, DUODENOSCOPY (EGD), DILATATION;  Surgeon: Jos Dinero MD;  Location:  GI     Laparoscopic salpingo-oophorectomy Left 3/2015     Esophagoscopy, gastroscopy, duodenoscopy (egd), combined N/A 8/18/2016     Procedure: COMBINED ESOPHAGOSCOPY, GASTROSCOPY, DUODENOSCOPY (EGD);  Surgeon: Jaswinder Waddell MD;  Location:  GI            Social History:     Social History   Substance Use Topics     Smoking status: Former Smoker     Packs/day: 0.25     Years: 6.00     Types: Cigarettes     Quit date: 6/16/2015     Smokeless tobacco: Never Used      Comment: social     Alcohol use Yes      Comment: occas            Family History:     Family History   Problem Relation Age of Onset     Breast Cancer Mother      age 42 on chemo and radiation     Gynecology Mother      endometriosis     DIABETES Maternal Grandmother      type 1     Thyroid Disease Maternal Grandmother      C.A.D. Maternal Grandmother      Cardiovascular Maternal Grandmother      HEART DISEASE Maternal Grandmother      HEART DISEASE Paternal Grandmother      DIABETES Maternal Grandfather      diabetes     Thyroid Disease Maternal Aunt      HEART DISEASE Maternal Aunt      great MGA     Gynecology Sister      endometriosis     Anesthesia Reaction No family hx of             Immunizations:     VACCINE/DOSE   Diptheria   DPT   DTAP   HBIG   Hepatitis A   Hepatitis B   HIB   Influenza   Measles   Meningococcal   MMR   Mumps   Pneumococcal   Polio   Rubella   Small Pox   TDAP   Varicella   Zoster            Allergies:     Allergies   Allergen Reactions     No Known Drug Allergies             Medications:     Current Facility-Administered Medications   Medication     lidocaine 1 % 1 mL     lidocaine (LMX4) kit     sodium chloride (PF) 0.9% PF flush 3 mL     sodium chloride (PF) 0.9% PF flush 3 mL     0.9%  sodium chloride BOLUS     0.9% sodium chloride infusion     lidocaine 1 % 1 mL     lactated ringers infusion             Review of Systems:   The review of systems was positive for the following findings.  abdomen.  The remainder of the review of systems was unremarkable.          Physical Exam:   PE:  B/P: 111/88, T: 98, P: Data Unavailable, R: 18  General: well developed, well nourished WF who appears her stated age  HEENT: NC/AT, EOMI, (-)icterus, (-)injection  Neck: Supple, No JVD  Chest: CTA  Heart: S1, S2, (-)m/r/g  Abd: Soft,  non distended, minimal epigastric tenderness to deep palpation, no masses  Ext; Warm, no edema  Psych: AAOx3  Neuro: No focal deficits            Data:   All laboratory data reviewed  Results for orders placed or performed in visit on 12/29/16   TB INTRADERMAL TEST   Result Value Ref Range    PPD Induration  0 - 5 mm    PPD Redness  mm    Narrative    Negative skin test 1/2/17    Impression    Negative skin test 1/2/17     -     Jan Johnston MD, FACS

## 2017-03-01 NOTE — ANESTHESIA PREPROCEDURE EVALUATION
Anesthesia Evaluation     . Pt has had prior anesthetic. Type: General and MAC    History of anesthetic complications  - PONV    ROS/MED HX    ENT/Pulmonary:     (+)tobacco use, Past use 0.25 ppd x 6 yrs - quit 06/16/2015 packs/day  , . .    Neurologic:     (+)migraines,     Cardiovascular:  - neg cardiovascular ROS       METS/Exercise Tolerance:  >4 METS   Hematologic: Comments: Electrolytes WNL - neg hematologic  ROS       Musculoskeletal:  - neg musculoskeletal ROS (+) , , other musculoskeletal- Restless leg syndrome      GI/Hepatic:     (+) GERD Symptomatic,       Renal/Genitourinary:     (+) Other Renal/ Genitourinary, Abdominal pain with regurgitation s.p. Gastric bypass - 2012      Endo: Comment: Without goiter    (+) thyroid problem hypothyroidism, .      Psychiatric:     (+) psychiatric history anxiety and depression      Infectious Disease:  - neg infectious disease ROS       Malignancy:      - no malignancy   Other:    (+) No chance of pregnancy C-spine cleared: N/A, no H/O Chronic Pain,no other significant disability   - neg other ROS           Physical Exam  Normal systems: cardiovascular, pulmonary and dental    Airway   Mallampati: II  TM distance: >3 FB  Neck ROM: full    Dental     Cardiovascular   Rhythm and rate: regular and normal  (-) no murmur    Pulmonary    breath sounds clear to auscultation                    Anesthesia Plan      History & Physical Review  History and physical reviewed and following examination; no interval change.    ASA Status:  2 .    NPO Status:  > 8 hours    Plan for MAC with Propofol induction. Maintenance will be TIVA.  Reason for MAC:  Deep or markedly invasive procedure (G8)  PONV prophylaxis:  Ondansetron (or other 5HT-3) and Dexamethasone or Solumedrol       Postoperative Care      Consents  Anesthetic plan, risks, benefits and alternatives discussed with:  Patient.  Use of blood products discussed: No .   .                          .

## 2017-03-06 LAB — COPATH REPORT: NORMAL

## 2017-03-25 ENCOUNTER — HOSPITAL ENCOUNTER (EMERGENCY)
Facility: CLINIC | Age: 32
Discharge: ANOTHER HOSPITAL | End: 2017-03-26
Attending: FAMILY MEDICINE | Admitting: FAMILY MEDICINE
Payer: COMMERCIAL

## 2017-03-25 ENCOUNTER — APPOINTMENT (OUTPATIENT)
Dept: GENERAL RADIOLOGY | Facility: CLINIC | Age: 32
End: 2017-03-25
Attending: FAMILY MEDICINE
Payer: COMMERCIAL

## 2017-03-25 DIAGNOSIS — R10.13 ABDOMINAL PAIN, EPIGASTRIC: ICD-10-CM

## 2017-03-25 DIAGNOSIS — R10.9 INTRACTABLE ABDOMINAL PAIN: ICD-10-CM

## 2017-03-25 LAB
ALBUMIN SERPL-MCNC: 4.3 G/DL (ref 3.4–5)
ALP SERPL-CCNC: 68 U/L (ref 40–150)
ALT SERPL W P-5'-P-CCNC: 16 U/L (ref 0–50)
ANION GAP SERPL CALCULATED.3IONS-SCNC: 6 MMOL/L (ref 3–14)
AST SERPL W P-5'-P-CCNC: 17 U/L (ref 0–45)
BASOPHILS # BLD AUTO: 0.1 10E9/L (ref 0–0.2)
BASOPHILS NFR BLD AUTO: 1.1 %
BILIRUB SERPL-MCNC: 0.2 MG/DL (ref 0.2–1.3)
BUN SERPL-MCNC: 16 MG/DL (ref 7–30)
CALCIUM SERPL-MCNC: 9 MG/DL (ref 8.5–10.1)
CHLORIDE SERPL-SCNC: 106 MMOL/L (ref 94–109)
CO2 SERPL-SCNC: 32 MMOL/L (ref 20–32)
CREAT SERPL-MCNC: 0.79 MG/DL (ref 0.52–1.04)
DIFFERENTIAL METHOD BLD: NORMAL
EOSINOPHIL # BLD AUTO: 0.1 10E9/L (ref 0–0.7)
EOSINOPHIL NFR BLD AUTO: 2.5 %
ERYTHROCYTE [DISTWIDTH] IN BLOOD BY AUTOMATED COUNT: 12.8 % (ref 10–15)
GFR SERPL CREATININE-BSD FRML MDRD: 83 ML/MIN/1.7M2
GLUCOSE SERPL-MCNC: 82 MG/DL (ref 70–99)
HCT VFR BLD AUTO: 39.1 % (ref 35–47)
HGB BLD-MCNC: 13.3 G/DL (ref 11.7–15.7)
IMM GRANULOCYTES # BLD: 0 10E9/L (ref 0–0.4)
IMM GRANULOCYTES NFR BLD: 0 %
LYMPHOCYTES # BLD AUTO: 1.9 10E9/L (ref 0.8–5.3)
LYMPHOCYTES NFR BLD AUTO: 43.2 %
MCH RBC QN AUTO: 32.9 PG (ref 26.5–33)
MCHC RBC AUTO-ENTMCNC: 34 G/DL (ref 31.5–36.5)
MCV RBC AUTO: 97 FL (ref 78–100)
MONOCYTES # BLD AUTO: 0.3 10E9/L (ref 0–1.3)
MONOCYTES NFR BLD AUTO: 7.6 %
NEUTROPHILS # BLD AUTO: 2 10E9/L (ref 1.6–8.3)
NEUTROPHILS NFR BLD AUTO: 45.6 %
PLATELET # BLD AUTO: 260 10E9/L (ref 150–450)
POTASSIUM SERPL-SCNC: 3.9 MMOL/L (ref 3.4–5.3)
PROT SERPL-MCNC: 7.7 G/DL (ref 6.8–8.8)
RBC # BLD AUTO: 4.04 10E12/L (ref 3.8–5.2)
SODIUM SERPL-SCNC: 144 MMOL/L (ref 133–144)
WBC # BLD AUTO: 4.4 10E9/L (ref 4–11)

## 2017-03-25 PROCEDURE — 96375 TX/PRO/DX INJ NEW DRUG ADDON: CPT

## 2017-03-25 PROCEDURE — 96376 TX/PRO/DX INJ SAME DRUG ADON: CPT

## 2017-03-25 PROCEDURE — 96365 THER/PROPH/DIAG IV INF INIT: CPT

## 2017-03-25 PROCEDURE — 25000128 H RX IP 250 OP 636: Performed by: FAMILY MEDICINE

## 2017-03-25 PROCEDURE — 86901 BLOOD TYPING SEROLOGIC RH(D): CPT | Performed by: FAMILY MEDICINE

## 2017-03-25 PROCEDURE — 99285 EMERGENCY DEPT VISIT HI MDM: CPT | Mod: 25

## 2017-03-25 PROCEDURE — 83690 ASSAY OF LIPASE: CPT | Performed by: FAMILY MEDICINE

## 2017-03-25 PROCEDURE — 80053 COMPREHEN METABOLIC PANEL: CPT | Performed by: FAMILY MEDICINE

## 2017-03-25 PROCEDURE — 86900 BLOOD TYPING SEROLOGIC ABO: CPT | Performed by: FAMILY MEDICINE

## 2017-03-25 PROCEDURE — 86850 RBC ANTIBODY SCREEN: CPT | Performed by: FAMILY MEDICINE

## 2017-03-25 PROCEDURE — 85025 COMPLETE CBC W/AUTO DIFF WBC: CPT | Performed by: FAMILY MEDICINE

## 2017-03-25 PROCEDURE — 99285 EMERGENCY DEPT VISIT HI MDM: CPT | Performed by: FAMILY MEDICINE

## 2017-03-25 PROCEDURE — 74020 XR ABDOMEN 2 VW: CPT | Mod: TC

## 2017-03-25 PROCEDURE — 96361 HYDRATE IV INFUSION ADD-ON: CPT

## 2017-03-25 RX ORDER — HYDROMORPHONE HYDROCHLORIDE 1 MG/ML
0.5 INJECTION, SOLUTION INTRAMUSCULAR; INTRAVENOUS; SUBCUTANEOUS
Status: COMPLETED | OUTPATIENT
Start: 2017-03-25 | End: 2017-03-25

## 2017-03-25 RX ORDER — LORAZEPAM 2 MG/ML
0.5 INJECTION INTRAMUSCULAR
Status: DISCONTINUED | OUTPATIENT
Start: 2017-03-25 | End: 2017-03-26 | Stop reason: HOSPADM

## 2017-03-25 RX ORDER — ONDANSETRON 2 MG/ML
4 INJECTION INTRAMUSCULAR; INTRAVENOUS EVERY 30 MIN PRN
Status: DISCONTINUED | OUTPATIENT
Start: 2017-03-25 | End: 2017-03-26 | Stop reason: HOSPADM

## 2017-03-25 RX ORDER — LIDOCAINE 40 MG/G
CREAM TOPICAL
Status: DISCONTINUED | OUTPATIENT
Start: 2017-03-25 | End: 2017-03-26 | Stop reason: HOSPADM

## 2017-03-25 RX ORDER — HYDROMORPHONE HYDROCHLORIDE 1 MG/ML
0.5 INJECTION, SOLUTION INTRAMUSCULAR; INTRAVENOUS; SUBCUTANEOUS
Status: DISCONTINUED | OUTPATIENT
Start: 2017-03-25 | End: 2017-03-26 | Stop reason: HOSPADM

## 2017-03-25 RX ADMIN — LORAZEPAM 0.5 MG: 2 INJECTION, SOLUTION INTRAMUSCULAR; INTRAVENOUS at 22:58

## 2017-03-25 RX ADMIN — HYDROMORPHONE HYDROCHLORIDE 0.5 MG: 1 INJECTION, SOLUTION INTRAMUSCULAR; INTRAVENOUS; SUBCUTANEOUS at 22:43

## 2017-03-25 RX ADMIN — HYDROMORPHONE HYDROCHLORIDE 0.5 MG: 1 INJECTION, SOLUTION INTRAMUSCULAR; INTRAVENOUS; SUBCUTANEOUS at 22:16

## 2017-03-25 RX ADMIN — HYDROMORPHONE HYDROCHLORIDE 0.5 MG: 1 INJECTION, SOLUTION INTRAMUSCULAR; INTRAVENOUS; SUBCUTANEOUS at 23:44

## 2017-03-25 RX ADMIN — ONDANSETRON 4 MG: 2 INJECTION, SOLUTION INTRAMUSCULAR; INTRAVENOUS at 22:47

## 2017-03-25 RX ADMIN — SODIUM CHLORIDE 1000 ML: 9 INJECTION, SOLUTION INTRAVENOUS at 22:11

## 2017-03-25 RX ADMIN — HYDROMORPHONE HYDROCHLORIDE 0.5 MG: 1 INJECTION, SOLUTION INTRAMUSCULAR; INTRAVENOUS; SUBCUTANEOUS at 21:46

## 2017-03-26 ENCOUNTER — HOSPITAL ENCOUNTER (INPATIENT)
Facility: CLINIC | Age: 32
LOS: 1 days | Discharge: HOME | DRG: 392 | End: 2017-03-27
Attending: HOSPITALIST | Admitting: HOSPITALIST
Payer: COMMERCIAL

## 2017-03-26 ENCOUNTER — APPOINTMENT (OUTPATIENT)
Dept: CT IMAGING | Facility: CLINIC | Age: 32
End: 2017-03-26
Attending: FAMILY MEDICINE
Payer: COMMERCIAL

## 2017-03-26 VITALS
TEMPERATURE: 97.6 F | OXYGEN SATURATION: 97 % | SYSTOLIC BLOOD PRESSURE: 121 MMHG | BODY MASS INDEX: 25.83 KG/M2 | WEIGHT: 155 LBS | DIASTOLIC BLOOD PRESSURE: 87 MMHG | RESPIRATION RATE: 20 BRPM | HEIGHT: 65 IN

## 2017-03-26 DIAGNOSIS — R10.13 ABDOMINAL PAIN, ACUTE, EPIGASTRIC: Primary | ICD-10-CM

## 2017-03-26 LAB
ABO + RH BLD: NORMAL
ABO + RH BLD: NORMAL
BLD GP AB SCN SERPL QL: NORMAL
BLOOD BANK CMNT PATIENT-IMP: NORMAL
HEMOCCULT STL QL: NEGATIVE
LACTATE BLD-SCNC: 0.4 MMOL/L (ref 0.7–2.1)
LIPASE SERPL-CCNC: 239 U/L (ref 73–393)
SPECIMEN EXP DATE BLD: NORMAL
TROPONIN I SERPL-MCNC: 0.02 UG/L (ref 0–0.04)

## 2017-03-26 PROCEDURE — 25000128 H RX IP 250 OP 636: Performed by: INTERNAL MEDICINE

## 2017-03-26 PROCEDURE — 82272 OCCULT BLD FECES 1-3 TESTS: CPT | Performed by: FAMILY MEDICINE

## 2017-03-26 PROCEDURE — 25000132 ZZH RX MED GY IP 250 OP 250 PS 637: Performed by: HOSPITALIST

## 2017-03-26 PROCEDURE — 25900017 H RX MED GY IP 259 OP 259 PS 637: Performed by: SURGERY

## 2017-03-26 PROCEDURE — 96376 TX/PRO/DX INJ SAME DRUG ADON: CPT

## 2017-03-26 PROCEDURE — 12000001 ZZH R&B MED SURG/OB UMMC

## 2017-03-26 PROCEDURE — 93010 ELECTROCARDIOGRAM REPORT: CPT | Performed by: INTERNAL MEDICINE

## 2017-03-26 PROCEDURE — 84484 ASSAY OF TROPONIN QUANT: CPT | Performed by: HOSPITALIST

## 2017-03-26 PROCEDURE — 25000125 ZZHC RX 250: Performed by: FAMILY MEDICINE

## 2017-03-26 PROCEDURE — 25000128 H RX IP 250 OP 636: Performed by: FAMILY MEDICINE

## 2017-03-26 PROCEDURE — S0164 INJECTION PANTROPRAZOLE: HCPCS | Performed by: FAMILY MEDICINE

## 2017-03-26 PROCEDURE — 25000132 ZZH RX MED GY IP 250 OP 250 PS 637: Performed by: INTERNAL MEDICINE

## 2017-03-26 PROCEDURE — 36415 COLL VENOUS BLD VENIPUNCTURE: CPT | Performed by: HOSPITALIST

## 2017-03-26 PROCEDURE — 25000128 H RX IP 250 OP 636: Performed by: HOSPITALIST

## 2017-03-26 PROCEDURE — 93005 ELECTROCARDIOGRAM TRACING: CPT

## 2017-03-26 PROCEDURE — 99223 1ST HOSP IP/OBS HIGH 75: CPT | Mod: AI | Performed by: HOSPITALIST

## 2017-03-26 PROCEDURE — 83605 ASSAY OF LACTIC ACID: CPT | Performed by: HOSPITALIST

## 2017-03-26 PROCEDURE — 25000132 ZZH RX MED GY IP 250 OP 250 PS 637: Performed by: FAMILY MEDICINE

## 2017-03-26 PROCEDURE — S0191 MISOPROSTOL, ORAL, 200 MCG: HCPCS | Performed by: SURGERY

## 2017-03-26 PROCEDURE — 74176 CT ABD & PELVIS W/O CONTRAST: CPT

## 2017-03-26 PROCEDURE — 25000125 ZZHC RX 250: Performed by: INTERNAL MEDICINE

## 2017-03-26 RX ORDER — ONDANSETRON 2 MG/ML
4 INJECTION INTRAMUSCULAR; INTRAVENOUS EVERY 6 HOURS PRN
Status: DISCONTINUED | OUTPATIENT
Start: 2017-03-26 | End: 2017-03-27 | Stop reason: HOSPADM

## 2017-03-26 RX ORDER — MISOPROSTOL 200 UG/1
200 TABLET ORAL EVERY 6 HOURS SCHEDULED
Status: DISCONTINUED | OUTPATIENT
Start: 2017-03-26 | End: 2017-03-27 | Stop reason: HOSPADM

## 2017-03-26 RX ORDER — ACETAMINOPHEN 325 MG/1
650 TABLET ORAL EVERY 4 HOURS PRN
Status: DISCONTINUED | OUTPATIENT
Start: 2017-03-26 | End: 2017-03-27 | Stop reason: HOSPADM

## 2017-03-26 RX ORDER — HYDROMORPHONE HCL/0.9% NACL/PF 0.2MG/0.2
0.2 SYRINGE (ML) INTRAVENOUS
Status: DISCONTINUED | OUTPATIENT
Start: 2017-03-26 | End: 2017-03-27

## 2017-03-26 RX ORDER — POLYETHYLENE GLYCOL 3350 17 G/17G
17 POWDER, FOR SOLUTION ORAL DAILY PRN
Status: DISCONTINUED | OUTPATIENT
Start: 2017-03-26 | End: 2017-03-27 | Stop reason: HOSPADM

## 2017-03-26 RX ORDER — ESCITALOPRAM OXALATE 20 MG/1
20 TABLET ORAL DAILY
Status: DISCONTINUED | OUTPATIENT
Start: 2017-03-26 | End: 2017-03-27 | Stop reason: HOSPADM

## 2017-03-26 RX ORDER — HYDROXYZINE HYDROCHLORIDE 10 MG/1
10 TABLET, FILM COATED ORAL 3 TIMES DAILY PRN
Status: DISCONTINUED | OUTPATIENT
Start: 2017-03-26 | End: 2017-03-27 | Stop reason: HOSPADM

## 2017-03-26 RX ORDER — ESTRADIOL 0.07 MG/D
1 PATCH TRANSDERMAL WEEKLY
Status: DISCONTINUED | OUTPATIENT
Start: 2017-03-26 | End: 2017-03-26

## 2017-03-26 RX ORDER — AMOXICILLIN 250 MG
1-2 CAPSULE ORAL 2 TIMES DAILY
Status: DISCONTINUED | OUTPATIENT
Start: 2017-03-26 | End: 2017-03-27 | Stop reason: HOSPADM

## 2017-03-26 RX ORDER — SUCRALFATE ORAL 1 G/10ML
1 SUSPENSION ORAL
Status: DISCONTINUED | OUTPATIENT
Start: 2017-03-26 | End: 2017-03-27 | Stop reason: HOSPADM

## 2017-03-26 RX ORDER — PROCHLORPERAZINE MALEATE 5 MG
5-10 TABLET ORAL EVERY 6 HOURS PRN
Status: DISCONTINUED | OUTPATIENT
Start: 2017-03-26 | End: 2017-03-27 | Stop reason: HOSPADM

## 2017-03-26 RX ORDER — ESTRADIOL 0.07 MG/D
1 PATCH TRANSDERMAL WEEKLY
Status: DISCONTINUED | OUTPATIENT
Start: 2017-03-27 | End: 2017-03-26

## 2017-03-26 RX ORDER — NALOXONE HYDROCHLORIDE 0.4 MG/ML
.1-.4 INJECTION, SOLUTION INTRAMUSCULAR; INTRAVENOUS; SUBCUTANEOUS
Status: DISCONTINUED | OUTPATIENT
Start: 2017-03-26 | End: 2017-03-27 | Stop reason: HOSPADM

## 2017-03-26 RX ORDER — OXYCODONE HYDROCHLORIDE 5 MG/1
5-10 TABLET ORAL EVERY 4 HOURS PRN
Status: DISCONTINUED | OUTPATIENT
Start: 2017-03-26 | End: 2017-03-27 | Stop reason: HOSPADM

## 2017-03-26 RX ORDER — HYDROMORPHONE HYDROCHLORIDE 1 MG/ML
.3-.5 INJECTION, SOLUTION INTRAMUSCULAR; INTRAVENOUS; SUBCUTANEOUS EVERY 4 HOURS PRN
Status: DISCONTINUED | OUTPATIENT
Start: 2017-03-26 | End: 2017-03-26

## 2017-03-26 RX ORDER — ONDANSETRON 4 MG/1
4 TABLET, ORALLY DISINTEGRATING ORAL EVERY 6 HOURS PRN
Status: DISCONTINUED | OUTPATIENT
Start: 2017-03-26 | End: 2017-03-27 | Stop reason: HOSPADM

## 2017-03-26 RX ORDER — HYDROMORPHONE HYDROCHLORIDE 1 MG/ML
0.5 INJECTION, SOLUTION INTRAMUSCULAR; INTRAVENOUS; SUBCUTANEOUS ONCE
Status: COMPLETED | OUTPATIENT
Start: 2017-03-26 | End: 2017-03-26

## 2017-03-26 RX ORDER — PROCHLORPERAZINE 25 MG
25 SUPPOSITORY, RECTAL RECTAL EVERY 12 HOURS PRN
Status: DISCONTINUED | OUTPATIENT
Start: 2017-03-26 | End: 2017-03-27 | Stop reason: HOSPADM

## 2017-03-26 RX ORDER — ESTRADIOL 0.07 MG/D
1 PATCH TRANSDERMAL
Status: DISCONTINUED | OUTPATIENT
Start: 2017-03-27 | End: 2017-03-27 | Stop reason: HOSPADM

## 2017-03-26 RX ORDER — SODIUM CHLORIDE 9 MG/ML
INJECTION, SOLUTION INTRAVENOUS CONTINUOUS
Status: DISCONTINUED | OUTPATIENT
Start: 2017-03-26 | End: 2017-03-27 | Stop reason: HOSPADM

## 2017-03-26 RX ORDER — LIDOCAINE 40 MG/G
CREAM TOPICAL
Status: DISCONTINUED | OUTPATIENT
Start: 2017-03-26 | End: 2017-03-27 | Stop reason: HOSPADM

## 2017-03-26 RX ORDER — TRAMADOL HYDROCHLORIDE 50 MG/1
50 TABLET ORAL ONCE
Status: COMPLETED | OUTPATIENT
Start: 2017-03-26 | End: 2017-03-26

## 2017-03-26 RX ADMIN — SUCRALFATE 1 G: 1 SUSPENSION ORAL at 17:06

## 2017-03-26 RX ADMIN — HYDROMORPHONE HYDROCHLORIDE 0.2 MG: 10 INJECTION, SOLUTION INTRAMUSCULAR; INTRAVENOUS; SUBCUTANEOUS at 11:52

## 2017-03-26 RX ADMIN — ONDANSETRON 4 MG: 2 INJECTION INTRAMUSCULAR; INTRAVENOUS at 04:36

## 2017-03-26 RX ADMIN — HYDROMORPHONE HYDROCHLORIDE 0.5 MG: 10 INJECTION, SOLUTION INTRAMUSCULAR; INTRAVENOUS; SUBCUTANEOUS at 04:36

## 2017-03-26 RX ADMIN — OXYCODONE HYDROCHLORIDE 5 MG: 5 TABLET ORAL at 17:48

## 2017-03-26 RX ADMIN — ONDANSETRON 4 MG: 2 INJECTION INTRAMUSCULAR; INTRAVENOUS at 11:57

## 2017-03-26 RX ADMIN — LIDOCAINE HYDROCHLORIDE 30 ML: 20 SOLUTION ORAL; TOPICAL at 09:01

## 2017-03-26 RX ADMIN — HYDROXYZINE HYDROCHLORIDE 10 MG: 10 TABLET ORAL at 19:49

## 2017-03-26 RX ADMIN — SODIUM CHLORIDE: 9 INJECTION, SOLUTION INTRAVENOUS at 14:30

## 2017-03-26 RX ADMIN — LEVOTHYROXINE SODIUM 275 MCG: 150 TABLET ORAL at 08:55

## 2017-03-26 RX ADMIN — OXYCODONE HYDROCHLORIDE 5 MG: 5 TABLET ORAL at 17:06

## 2017-03-26 RX ADMIN — HYDROMORPHONE HYDROCHLORIDE 0.5 MG: 1 INJECTION, SOLUTION INTRAMUSCULAR; INTRAVENOUS; SUBCUTANEOUS at 01:25

## 2017-03-26 RX ADMIN — SODIUM CHLORIDE 80 MG: 9 INJECTION, SOLUTION INTRAVENOUS at 02:39

## 2017-03-26 RX ADMIN — PANTOPRAZOLE SODIUM 40 MG: 40 INJECTION, POWDER, FOR SOLUTION INTRAVENOUS at 17:08

## 2017-03-26 RX ADMIN — PANTOPRAZOLE SODIUM 40 MG: 40 INJECTION, POWDER, FOR SOLUTION INTRAVENOUS at 04:45

## 2017-03-26 RX ADMIN — SENNOSIDES AND DOCUSATE SODIUM 2 TABLET: 8.6; 5 TABLET ORAL at 08:55

## 2017-03-26 RX ADMIN — ESCITALOPRAM OXALATE 20 MG: 20 TABLET ORAL at 08:55

## 2017-03-26 RX ADMIN — SUCRALFATE 1 G: 1 SUSPENSION ORAL at 07:46

## 2017-03-26 RX ADMIN — MISOPROSTOL 200 MCG: 200 TABLET ORAL at 17:49

## 2017-03-26 RX ADMIN — SENNOSIDES AND DOCUSATE SODIUM 2 TABLET: 8.6; 5 TABLET ORAL at 19:48

## 2017-03-26 RX ADMIN — HYDROXYZINE HYDROCHLORIDE 10 MG: 10 TABLET ORAL at 06:03

## 2017-03-26 RX ADMIN — SUCRALFATE 1 G: 1 SUSPENSION ORAL at 22:21

## 2017-03-26 RX ADMIN — SODIUM CHLORIDE: 9 INJECTION, SOLUTION INTRAVENOUS at 05:27

## 2017-03-26 RX ADMIN — LIDOCAINE HYDROCHLORIDE 30 ML: 20 SOLUTION ORAL; TOPICAL at 02:17

## 2017-03-26 RX ADMIN — HYDROMORPHONE HYDROCHLORIDE 0.5 MG: 10 INJECTION, SOLUTION INTRAMUSCULAR; INTRAVENOUS; SUBCUTANEOUS at 07:44

## 2017-03-26 RX ADMIN — TRAMADOL HYDROCHLORIDE 50 MG: 50 TABLET, COATED ORAL at 06:02

## 2017-03-26 RX ADMIN — OXYCODONE HYDROCHLORIDE 5 MG: 5 TABLET ORAL at 22:22

## 2017-03-26 NOTE — CONSULTS
Bariatric Surgery Consult Note  Staff: Dr. Dinero  Date: 3/26/2017   Consulted for: Need for EGD by Dr. Edwards    Assessment/Plan:  31 year old female with h/o multiple abdominal surgeries including RNY gastric bypass in 2012 with revision of yara limb in 2015 for chronic marginal ulcer refractory to medical management. She underwent laparoscopic resection of gastrojejunal ulcer 3/2016 and EGD with dilatation 4/2016. She presented yesterday for acute onset abdominal pain in the LUQ. There is no clear etiology of the pain based on labs or imaging at this time.  - No diagnostic or therapeutic indication for EGD -- low likelihood for recurrent ulcer or stricture  - Recommend cytotec (misoprostol) 200 mcg QID - ordered  - Could consider UA or CXR (LLL PNA) to evaluate for other etiologies of pain  - MIS to follow    Discussed with Ryan Ricardo and Rosette Tran MD  General Surgery PGY-2    ------------------------------------------  HPI:   Lida Blood is a 31 year old female with h/o multiple abdominal surgeries including RNY gastric bypass in 2012 with revision of yara limb in 2015 for chronic marginal ulcer refractory to medical management. She underwent laparoscopic resection of gastrojejunal ulcer 3/2016 and EGD with dilatation 4/2016. She presented yesterday for acute onset abdominal pain in the LUQ. This is different from the baseline burning she experiences in the AM. She recently underwent EGD on 3/1/17 which did not demonstrate new ulcer but did note a nodule at the G-J junction. The biopsy of the nodule did not demonstrate gastric mucosa but did show acutely and chronically inflammed granulation tissue. She was tolerating regular food before the pain started but no has decreased appetite and nausea. She states that the pain improves after eating. Complains of distention and burning/sharp LUQ pain. Last BM was last night. One episode of emesis with dark red vomitus. This is has since  resolved and she denies light headedness, dizziness or weakness.    PMH:  Past Medical History:   Diagnosis Date     Endometriosis of pelvic peritoneum 10/06/08     Endometriosis of uterus 10/06/08    Admit. 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    s/p radioactive iodine ablation Rx     Major depressive disorder, single episode, moderate (H)      Migraine headache 10/14/2011     Obesity (BMI 30-39.9)      Other and unspecified ovarian cyst 03/02/10    d/c 03/04/10     Pain pelvic 12/14/04    Discharged 12/17/04-[facility]     PONV (postoperative nausea and vomiting)      S/P hysterectomy      Thyrotoxicosis without mention of goiter or other cause, without mention of thyrotoxic crisis or storm 2001     trachelectomy 05/19/09        PSHx:  Past Surgical History:   Procedure Laterality Date     C APPENDECTOMY  4/18/2003     C REMOVAL GALLBLADDER  8/1/03     C REMOVAL OF CERVIX  05/29/09    laparoscopic assisted vaginal trachelectomy     C SUPRACERV ABD HYSTERECTOMY  10/06/08     COLONOSCOPY  11/25/14     ESOPHAGOSCOPY, GASTROSCOPY, DUODENOSCOPY (EGD), COMBINED N/A 8/31/2015    Procedure: COMBINED ESOPHAGOSCOPY, GASTROSCOPY, DUODENOSCOPY (EGD), BIOPSY SINGLE OR MULTIPLE;  Surgeon: Toni Wiggins MD;  Location:  GI     ESOPHAGOSCOPY, GASTROSCOPY, DUODENOSCOPY (EGD), COMBINED N/A 8/18/2016    Procedure: COMBINED ESOPHAGOSCOPY, GASTROSCOPY, DUODENOSCOPY (EGD);  Surgeon: Jaswinder Waddell MD;  Location:  GI     ESOPHAGOSCOPY, GASTROSCOPY, DUODENOSCOPY (EGD), COMBINED N/A 3/1/2017    Procedure: COMBINED ESOPHAGOSCOPY, GASTROSCOPY, DUODENOSCOPY (EGD), BIOPSY SINGLE OR MULTIPLE;  Surgeon: Jan Johnston MD;  Location:  GI     ESOPHAGOSCOPY, GASTROSCOPY, DUODENOSCOPY (EGD), DILATATION, COMBINED N/A 4/13/2016    Procedure: COMBINED ESOPHAGOSCOPY, GASTROSCOPY, DUODENOSCOPY (EGD), DILATATION;  Surgeon: Rosette  Jos Short MD;  Location:  GI     EYE SURGERY      bilat orbital decompression surgery      HC COLONOSCOPY W BIOPSY  1/22/2007     HC COLONOSCOPY W/WO BRUSH/WASH  12/16/04     HC CREATE EARDRUM OPENING,GEN ANESTH  12/29/88    Bilateral myringotomy with insertion of PE tubes     HC CREATE EARDRUM OPENING,GEN ANESTH  12/20/90    Bilateral myringotomy with insertion of PE tubes     HC LAP,FULGURATE/EXCISE LESIONS  06/16/08    Laparoscopy, ablation of the endometriosis and diagnostic cystoscopy     HC LAPAROSCOPY, SURGICAL, ABDOMEN, PERITONEUM & OMENTUM; DX W/ OR W/O SPECIMEN(S)  04/18/2003    Diagnostic laparoscopy     HC TYMPANOPLASTY W/O MASTOID, INIT/REV W/O OSS CHAIN RECONST  10/03/00    Left exploratory tympanoplasty, myringoplasty, fascia grafting of the oval window, microdissection via the use of operative microscope     HC UGI ENDOSCOPY, SIMPLE EXAM  3/23/12, 11/13/14     HERNIA REPAIR, INCISIONAL  08/23/2006    Laparoscopic mesh repair.     HYSTERECTOMY, PAP NO LONGER INDICATED  5/29/09     HYSTEROSCOPY  02/08/08     LAPAROSCOPIC GASTRECTOMY N/A 3/15/2016    Procedure: LAPAROSCOPIC GASTRECTOMY;  Surgeon: oJs Dinero MD;  Location:  OR     LAPAROSCOPIC LYSIS ADHESIONS  11/02/10    [facility]     LAPAROSCOPIC REVISION GASTROPLASTY TO ELBERT-EN-Y N/A 10/26/2015    Procedure: LAPAROSCOPIC REVISION GASTROPLASTY TO ELBERT-EN-Y;  Surgeon: Toni Wiggins MD;  Location:  OR     LAPAROSCOPIC SALPINGO-OOPHORECTOMY Left 3/2015     LAPAROSCOPY DIAGNOSTIC (GENERAL) Right 06/23/11    Evaluation of abdomen/pelvis, RSO, Cysto; [facility]        Medications:  CALCIUM PO Take by mouth daily   sucralfate (CARAFATE) 1 GM/10ML suspension Take 10 mLs (1 g) by mouth 4 times daily   OMEPRAZOLE PO Take 40 mg by mouth 2 times daily (before meals)   levothyroxine (SYNTHROID, LEVOTHROID) 200 MCG tablet Take 1 tablet (200 mcg) by mouth daily   estradiol 0.075 MG/24HR PTWK Place 1 patch onto the skin once  a week   levothyroxine (SYNTHROID, LEVOTHROID) 75 MCG tablet Take 1 tablet (75 mcg) by mouth daily Take along with 200 mcg tablet daily.   Cholecalciferol (VITAMIN D PO) Take by mouth daily   escitalopram (LEXAPRO) 20 MG tablet Take 1 tablet (20 mg) by mouth daily       Allergies:   No known drug allergies     SocHx:  Social History     Social History     Marital status:      Spouse name: patric     Number of children: 01     Years of education: 12     Occupational History     specialist Other     [employer]     Social History Main Topics     Smoking status: Former Smoker     Packs/day: 0.25     Years: 6.00     Types: Cigarettes     Quit date: 6/16/2015     Smokeless tobacco: Never Used      Comment: social     Alcohol use Yes      Comment: occas     Drug use: No     Sexual activity: Yes     Partners: Male     Birth control/ protection: Surgical      Comment: hysterectomy 2008     Other Topics Concern      Service No     Blood Transfusions No     Caffeine Concern No     Occupational Exposure No     Hobby Hazards No     Sleep Concern No     Stress Concern Yes     Weight Concern Yes     she exercised all the time     Special Diet No     Back Care No     Exercise Yes     all the time     Bike Helmet No     Seat Belt Yes     Self-Exams No     FamHx:  Family History   Problem Relation Age of Onset     Breast Cancer Mother      age 42 on chemo and radiation     Gynecology Mother      endometriosis     DIABETES Maternal Grandmother      type 1     Thyroid Disease Maternal Grandmother      C.A.D. Maternal Grandmother      Cardiovascular Maternal Grandmother      HEART DISEASE Maternal Grandmother      HEART DISEASE Paternal Grandmother      DIABETES Maternal Grandfather      diabetes     Thyroid Disease Maternal Aunt      HEART DISEASE Maternal Aunt      great MGA     Gynecology Sister      endometriosis     Anesthesia Reaction No family hx of     Negative for bleeding disorders, clotting disorders, or problems  "with anesthesia    Review of Systems:  ROS: 10 point ROS neg other than the symptoms noted above in the HPI.    Physical Examination   /78 (BP Location: Left arm)  Pulse 70  Temp 97.4  F (36.3  C) (Oral)  Resp 16  Ht 1.651 m (5' 5\")  Wt 72.4 kg (159 lb 11.2 oz)  LMP 09/01/2008  SpO2 95%  BMI 26.58 kg/m2  General: Awake and alert. NAD  Pulm: Non labored breathing, no tachypnea   CV: RRR  Abdomen: soft, tender to palpation and percussion in LUQ and epigastrium, no guarding, no peritoneal signs  Musculoskel/Extremities: no edema, erythema, tenderness   Skin: no rashes, no diaphoresis and skin color normal     Labs: Reviewed  Lactate 0.4    WBC 4.4    Imaging: Reviewed  AXR  No evidence for free air or bowel obstruction. Scattered postoperative change and stable linear pelvic density suggesting tip of a stimulator wire.    CT Abd Pelvis  1. No acute abnormality to explain abdominal pain. No evidence of bowel obstruction. No free intraperitoneal air. No free or loculated intraperitoneal fluid collection.  2. Postsurgical changes consistent with gastric bypass.    Physician Attestation  I, Jos Dinero, saw and evaluated this patient as part of a shared visit.  I have reviewed and discussed with the advanced practice provider and/or resident their history, physical and plan.    I personally reviewed the vital signs, medications, labs and imaging.    My key history or physical exam findings: no clear cause of abdominal pain.  No ulcer.    Key management decisions made by me: discharge    Jos Dinero MD  Date of Service (when I saw the patient): 3/26/2017    "

## 2017-03-26 NOTE — CONSULTS
GASTROENTEROLOGY CONSULTATION      Date of Admission:  3/26/2017          ASSESSMENT AND RECOMMENDATIONS:   Assessment:  31 year old female with a history of with hx of CCY, appendectomy, hysterectomy, as well as RNYGB done in [facility] in 2012, complicated by marginal ulcer s/p resection the Arely limb, she also has chronic ulcer at the GJ anastomosis site s/p resection 3/2016, Graves disease, presented with acute onset abdominal pain. Her labs, including CBC, BMP, lipase, LFT, lactate all fairly unremarkable, CT only showed post surgical changes, nothing to explain her pain. She has had recent EGD done which showed small nodule with ulcer like material on biopsy.    It's not clear what's causing her pain, there was no blood in her emesis or BM and her hemoglobin is stable, doubt GIB. She could have adhesions 2/2 to her complicated surgical past, or internal hernia not seen on CT, the onset of pain during physical lifting is suggestive of musculoskeletal pain. The severity of the pain, the acute onset, makes the ulcer as the cause of her pain somewhat unlikely, as she has struggled with what is thought to be anastomotic ulcers likely 2/2 to ischemia since her initial surgery.      Recommendations:  - NPO for now, can slowly advance diet once pain improves and patient feels ready  - rec bariatric surgery consult   - continue home regimen of BID PPI and Carafate   - ambulate if possible  - pain control per primary team, consider hot pack, lidocaine patch    Thank you for involving us in this patient's care. Please do not hesitate to contact the GI service with any questions or concerns.     Pt care plan discussed with Dr. Waddell, GI staff physician.    Thanh Livingston  -------------------------------------------------------------------------------------------------------------------          Chief Complaint:   We were asked by Dr. Feliciano of  to evaluate this patient with abdominal pain    History is obtained from  the patient and the medical record.          History of Present Illness:   Lida Blood is a 31 year old female with a history of with hx of CCY, appendectomy, hysterectomy, as well as RNYGB done in [facility] in 2012, complicated by marginal ulcer s/p resection the Arely limb, she also has chronic ulcer at the GJ anastomosis site s/p resection 3/2016, Graves disease, presented with acute onset abdominal pain. She states she has some chronic abdominal pain but nothing like this. For the past several weeks she was having worsening acid reflux and underwent an EGD on 3/1/2017, which did not show any esophagitis, nodule noted proximal to anastomosis, biopsy showed debris with ulcer material. She added Carafate and her acid reflux has improved. Day of presentation she went to lift up her laundry basket, and experienced acute onset, sharp left sided pain; she states this pain is much worse than her chronic pain and a bit different. She had emesis x 1, dark material, no blood, and her has BM was also dark but not tarry and there was no blood. Her pain was escalating prompting her to come to the hospital.   She was a smoker prior to her surgery but not since her RNY, she denies NSAID usage. She denies fevers, chills. She does have some pleuritic chest pain but she feels this is related to her abdominal pain and that it's radiating up.             Past Medical History:   Reviewed and edited as appropriate  Past Medical History:   Diagnosis Date     Endometriosis of pelvic peritoneum 10/06/08     Endometriosis of uterus 10/06/08    Admit. 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    s/p radioactive iodine ablation Rx     Major depressive disorder, single episode, moderate (H)      Migraine headache 10/14/2011     Obesity (BMI 30-39.9)      Other and unspecified ovarian cyst 03/02/10    d/c 03/04/10     Pain pelvic 12/14/04     Discharged 12/17/04-[facility]     PONV (postoperative nausea and vomiting)      S/P hysterectomy      Thyrotoxicosis without mention of goiter or other cause, without mention of thyrotoxic crisis or storm 2001     trachelectomy 05/19/09            Past Surgical History:   Reviewed and edited as appropriate   Past Surgical History:   Procedure Laterality Date     C APPENDECTOMY  4/18/2003     C REMOVAL GALLBLADDER  8/1/03     C REMOVAL OF CERVIX  05/29/09    laparoscopic assisted vaginal trachelectomy     C SUPRACERV ABD HYSTERECTOMY  10/06/08     COLONOSCOPY  11/25/14     ESOPHAGOSCOPY, GASTROSCOPY, DUODENOSCOPY (EGD), COMBINED N/A 8/31/2015    Procedure: COMBINED ESOPHAGOSCOPY, GASTROSCOPY, DUODENOSCOPY (EGD), BIOPSY SINGLE OR MULTIPLE;  Surgeon: Toni Wiggins MD;  Location:  GI     ESOPHAGOSCOPY, GASTROSCOPY, DUODENOSCOPY (EGD), COMBINED N/A 8/18/2016    Procedure: COMBINED ESOPHAGOSCOPY, GASTROSCOPY, DUODENOSCOPY (EGD);  Surgeon: Jaswinder Waddell MD;  Location: [facility]     ESOPHAGOSCOPY, GASTROSCOPY, DUODENOSCOPY (EGD), COMBINED N/A 3/1/2017    Procedure: COMBINED ESOPHAGOSCOPY, GASTROSCOPY, DUODENOSCOPY (EGD), BIOPSY SINGLE OR MULTIPLE;  Surgeon: Jan Johnston MD;  Location:  GI     ESOPHAGOSCOPY, GASTROSCOPY, DUODENOSCOPY (EGD), DILATATION, COMBINED N/A 4/13/2016    Procedure: COMBINED ESOPHAGOSCOPY, GASTROSCOPY, DUODENOSCOPY (EGD), DILATATION;  Surgeon: Jos Dinero MD;  Location:  GI     EYE SURGERY      bilat orbital decompression surgery      HC COLONOSCOPY W BIOPSY  1/22/2007     HC COLONOSCOPY W/WO BRUSH/WASH  12/16/04     HC CREATE EARDRUM OPENING,GEN ANESTH  12/29/88    Bilateral myringotomy with insertion of PE tubes     HC CREATE EARDRUM OPENING,GEN ANESTH  12/20/90    Bilateral myringotomy with insertion of PE tubes      LAP,FULGURATE/EXCISE LESIONS  06/16/08    Laparoscopy, ablation of the endometriosis and diagnostic cystoscopy     HC LAPAROSCOPY, SURGICAL,  ABDOMEN, PERITONEUM & OMENTUM; DX W/ OR W/O SPECIMEN(S)  04/18/2003    Diagnostic laparoscopy     HC TYMPANOPLASTY W/O MASTOID, INIT/REV W/O OSS CHAIN RECONST  10/03/00    Left exploratory tympanoplasty, myringoplasty, fascia grafting of the oval window, microdissection via the use of operative microscope     HC UGI ENDOSCOPY, SIMPLE EXAM  3/23/12, 11/13/14     HERNIA REPAIR, INCISIONAL  08/23/2006    Laparoscopic mesh repair.     HYSTERECTOMY, PAP NO LONGER INDICATED  5/29/09     HYSTEROSCOPY  02/08/08     LAPAROSCOPIC GASTRECTOMY N/A 3/15/2016    Procedure: LAPAROSCOPIC GASTRECTOMY;  Surgeon: Jos Dinero MD;  Location: [facility] OR     LAPAROSCOPIC LYSIS ADHESIONS  11/02/10    [facility]     LAPAROSCOPIC REVISION GASTROPLASTY TO ELBERT-EN-Y N/A 10/26/2015    Procedure: LAPAROSCOPIC REVISION GASTROPLASTY TO ELBERT-EN-Y;  Surgeon: Toni Wiggins MD;  Location: [facility] OR     LAPAROSCOPIC SALPINGO-OOPHORECTOMY Left 3/2015     LAPAROSCOPY DIAGNOSTIC (GENERAL) Right 06/23/11    Evaluation of abdomen/pelvis, RSO, Cysto; [facility]            Previous Endoscopy:   No results found for this or any previous visit.         Social History:   Reviewed and edited as appropriate  Social History     Social History     Marital status:      Spouse name: patric     Number of children: 01     Years of education: 12     Occupational History     specialist Other     [employer]     Social History Main Topics     Smoking status: Former Smoker     Packs/day: 0.25     Years: 6.00     Types: Cigarettes     Quit date: 6/16/2015     Smokeless tobacco: Never Used      Comment: social     Alcohol use Yes      Comment: occas     Drug use: No     Sexual activity: Yes     Partners: Male     Birth control/ protection: Surgical      Comment: hysterectomy 2008     Other Topics Concern      Service No     Blood Transfusions No     Caffeine Concern No     Occupational Exposure No     Hobby Hazards No     Sleep Concern No      Stress Concern Yes     Weight Concern Yes     she exercised all the time     Special Diet No     Back Care No     Exercise Yes     all the time     Bike Helmet No     Seat Belt Yes     Self-Exams No     Social History Narrative            Family History:   Reviewed and edited as appropriate  Family History   Problem Relation Age of Onset     Breast Cancer Mother      age 42 on chemo and radiation     Gynecology Mother      endometriosis     DIABETES Maternal Grandmother      type 1     Thyroid Disease Maternal Grandmother      C.A.D. Maternal Grandmother      Cardiovascular Maternal Grandmother      HEART DISEASE Maternal Grandmother      HEART DISEASE Paternal Grandmother      DIABETES Maternal Grandfather      diabetes     Thyroid Disease Maternal Aunt      HEART DISEASE Maternal Aunt      great MGA     Gynecology Sister      endometriosis     Anesthesia Reaction No family hx of            Allergies:   Reviewed and edited as appropriate     Allergies   Allergen Reactions     No Known Drug Allergies             Medications:     Current Facility-Administered Medications   Medication     naloxone (NARCAN) injection 0.1-0.4 mg     lidocaine 1 % 1 mL     lidocaine (LMX4) kit     sodium chloride (PF) 0.9% PF flush 3 mL     sodium chloride (PF) 0.9% PF flush 3 mL     acetaminophen (TYLENOL) tablet 650 mg     senna-docusate (SENOKOT-S;PERICOLACE) 8.6-50 MG per tablet 1-2 tablet     polyethylene glycol (MIRALAX/GLYCOLAX) Packet 17 g     ondansetron (ZOFRAN-ODT) ODT tab 4 mg    Or     ondansetron (ZOFRAN) injection 4 mg     prochlorperazine (COMPAZINE) injection 5-10 mg    Or     prochlorperazine (COMPAZINE) tablet 5-10 mg    Or     prochlorperazine (COMPAZINE) Suppository 25 mg     pantoprazole (PROTONIX) 40 mg IV push injection     escitalopram (LEXAPRO) tablet 20 mg     estradiol PTWK 1 patch     levothyroxine (SYNTHROID/LEVOTHROID) tablet 275 mcg     sucralfate (CARAFATE) suspension 1 g     0.9% sodium chloride infusion  "    hydrOXYzine (ATARAX) tablet 10 mg     estradiol weekly (CLIMARA) patch REMOVAL     estradiol weekly (CLIMARA) Patch in Place     HYDROmorphone (DILAUDID) injection 0.2 mg             Review of Systems:   A complete review of systems was otherwise negative.           Physical Exam:   /78 (BP Location: Left arm)  Pulse 70  Temp 97.4  F (36.3  C) (Oral)  Resp 16  Ht 1.651 m (5' 5\")  Wt 72.4 kg (159 lb 11.2 oz)  LMP 09/01/2008  SpO2 95%  BMI 26.58 kg/m2  Wt:   Wt Readings from Last 2 Encounters:   03/26/17 72.4 kg (159 lb 11.2 oz)   03/25/17 70.3 kg (155 lb)      Constitutional: cooperative, mild distress, appears very uncomfortable  Eyes: Sclera anicteric/injected  Ears/nose/mouth/throat: Normal oropharynx without ulcers or exudate, mucus membranes moist, hearing intact  CV: No edema  Respiratory: Unlabored breathing  Abd: non distended, previous surgical scars noted, ttp generalized but worse on left side  Skin: warm, perfused, no jaundice  Neuro: AAO x 3, No asterixis  Psych: Normal affect  MSK: Normal gait         Data:   BMP  Recent Labs  Lab 03/25/17  2130      POTASSIUM 3.9   CHLORIDE 106   OMID 9.0   CO2 32   BUN 16   CR 0.79   GLC 82     CBC  Recent Labs  Lab 03/25/17  2130   WBC 4.4   RBC 4.04   HGB 13.3   HCT 39.1   MCV 97   MCH 32.9   MCHC 34.0   RDW 12.8        INRNo lab results found in last 7 days.  LFTs  Recent Labs  Lab 03/25/17  2130   ALKPHOS 68   AST 17   ALT 16   BILITOTAL 0.2   PROTTOTAL 7.7   ALBUMIN 4.3      PANC  Recent Labs  Lab 03/25/17  2130   LIPASE 239       Imaging:   CT  1. No acute abnormality to explain abdominal pain.  2. Postsurgical changes consistent with gastric bypass.  "

## 2017-03-26 NOTE — PLAN OF CARE
Problem: Goal Outcome Summary  Goal: Goal Outcome Summary  Outcome: No Change  Pt arrived from [facility] around 0400 for abdominal pain. History of gastric bypass, upper endoscopy with bleeding ulcer, on omeprazole. Xray and CT done there at [facility]. Hgb 13.3. On protonix drip on arrival here, stopped. Report received from Transport personnel that they gave dilaudid 1 mg IV around 0345, pt had itching episode, no hives, pt received IV benadryl 50 mg on the way. Pt still itching. Denies difficulty breathing. Skin red from scratching, no hives noted. Dilaudid 0.5 mg and zofran 4 mg given at 0430. Up with stand by assist. Introduced to pt's bill of rights, unit routines. Call light in reach. Will continue to monitor and notify MD with change in status.     Pt C/O abdominal pain, MD notified, order received for one time dose of tramadol, given. Atarax given for itching.

## 2017-03-26 NOTE — H&P
"Internal Medicine History and Physical  Date of service: 3/26/2017  Attending physician: Dr. Feliciano      Patient: Lida Blood      : 1985      MRN: [id number]          Chief complaint:   Severe abdominal pain          Assessment/Plan:   Lida Blood is a 31 year old female with a PMHx significant for RNY gastric bypass in  complicated by marginal ulcer with resection of redundant Arely limb in , GJ ulcer s/p resection in 3/16,  Endometriosis s/p hysterectomy (about 11 years ago) and oophorectomy (~3 years ago) who presented to [facility] as a transfer with sudden onset of severe epigastric pain.    #. Epigastric Pain  DDx: Peptic Ulcer vs GERD vs atypical chest pain vs dissection (given radiation of pain to chest and back) vs pancreatitis vs obstruction vs ischemia vs perforation. CT negative for perforation or evidence of bowel edema/ischemia or obstruction. Pancreatitis less likely given negative lipase. Pt does have hx of ulcer disease and in most recent EGD (, see below) did have evidence of recent bleeding. Hgb today stable without evidence of obvious hematemesis; however, patient did report 1 episode of emesis that was \"dark/coffee-ground\"  -- IV PPI protonix 40mg BID  -- NPO for now  -- EKG and trop to evaluate for ACS  -- BP in both upper extremities to evaluate for possible dissection  -- GI cocktail now  -- Lactic acid  -- GI consult (ordered) for possible EGD in AM  -- Repeat CBC in AM in addition to INR/PTT  -- Continue Carafate QID  -- consider stool H. Pylro; however, patient has been on chronic PPI    # Puritis: likely 2/2 dilaudid  -- hydroxizine 10mg TID prn    # Acute pain:  -- Tylenol 650mg Q4h  -- consider dilaudid if needed for severe pain  -- NO NSAIDS    Chronic:  # Hx of hysterectomy and oophorectomy: no active issues  -- continue PTA estradiol patch (will need to be changed 3/27/17)    # Thyrotoxicosis s/p Iodine ablation now with hypothyroidism: no active " issues  -- continue PTA synthroid 275mcg/day    # ENRIKE: no active issues  -- continue PTA lexapro 20mg qd    FEN:  -- IVF: NS 100cc/hr for dry mucous membranes  -- Diet: NPO    PPX:   -- Ulcer: PPI  -- VTE: Hep SubQ    Code: Full    Dispo: Home pending work-up of epigastric pain    Plan d/w Dr. Jodie M.D., who is in agreement. Please, see staff addendum for changes/additions to the plan.    Lala Velasquez, PGY2  Internal Medicine/Pediatrics  P: [phone]          History of Present Illness:   Lida Blood is a 31 year old female with a PMHx significant for RNY gastric bypass in 2012 complicated by marginal ulcer with resection of redundant Arely limb in 2015, GJ ulcer s/p resection in 3/16,  Endometriosis s/p hysterectomy (about 11 years ago) and oophorectomy (~3 years ago) who presented to [facility] as a transfer with sudden onset of severe epigastric pain.    Pain began evening of 3/25/17 while the patient was doing laundry. She stated it was a sharp stabbing pain in the region of the epigastric and was radiating to the back and her chest. She also did have some diaphoresis. She stated that the pain was not improving so went into the ER. Patient also reports 1 episode of dark emesis (no bright red blood, non-bilious); she stated that it was most similar to coffee ground emesis. She also had 1 episode of dark stool; states that it was not black or tarry but darker than usual.  Pt states that normally she does have some abdominal discomfort but it has never been this sudden or bad. She is on omeprazole and Carafate (4 or more times/day) at home but does not think that they completely make the pain go away.     Currently, she is still endorsing some epigastric pain with radiation to back and chest. She is also c/o of itching since getting pain medication at the OSH. C/o of some nausea. Denies constipation, diarrhea, fever, sob, HA, vision changes, hematochezia.     At the OSH, she had a CT, CMP, lipase, CBC for  diagnostic work-up without any pertinent positives. She received dilaudid x3, 1x ativan, and GI cocktail.           Review of Symptoms:   10-point ROS reviewed, & found negative w/ exceptions noted in the HPI.          Past Medical History:     Past Medical History:   Diagnosis Date     Endometriosis of pelvic peritoneum 10/06/08     Endometriosis of uterus 10/06/08    Admit. 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    s/p radioactive iodine ablation Rx     Major depressive disorder, single episode, moderate (H)      Migraine headache 10/14/2011     Obesity (BMI 30-39.9)      Other and unspecified ovarian cyst 03/02/10    d/c 03/04/10     Pain pelvic 12/14/04    Discharged 12/17/04-[facility]     PONV (postoperative nausea and vomiting)      S/P hysterectomy      Thyrotoxicosis without mention of goiter or other cause, without mention of thyrotoxic crisis or storm 2001     trachelectomy 05/19/09       Past Surgical History:   Procedure Laterality Date     C APPENDECTOMY  4/18/2003     C REMOVAL GALLBLADDER  8/1/03     C REMOVAL OF CERVIX  05/29/09    laparoscopic assisted vaginal trachelectomy     C SUPRACERV ABD HYSTERECTOMY  10/06/08     COLONOSCOPY  11/25/14     ESOPHAGOSCOPY, GASTROSCOPY, DUODENOSCOPY (EGD), COMBINED N/A 8/31/2015    Procedure: COMBINED ESOPHAGOSCOPY, GASTROSCOPY, DUODENOSCOPY (EGD), BIOPSY SINGLE OR MULTIPLE;  Surgeon: Toni Wiggins MD;  Location:  GI     ESOPHAGOSCOPY, GASTROSCOPY, DUODENOSCOPY (EGD), COMBINED N/A 8/18/2016    Procedure: COMBINED ESOPHAGOSCOPY, GASTROSCOPY, DUODENOSCOPY (EGD);  Surgeon: Jaswinder Waddell MD;  Location:  GI     ESOPHAGOSCOPY, GASTROSCOPY, DUODENOSCOPY (EGD), COMBINED N/A 3/1/2017    Procedure: COMBINED ESOPHAGOSCOPY, GASTROSCOPY, DUODENOSCOPY (EGD), BIOPSY SINGLE OR MULTIPLE;  Surgeon: Jan Johnston MD;  Location:  GI     ESOPHAGOSCOPY,  GASTROSCOPY, DUODENOSCOPY (EGD), DILATATION, COMBINED N/A 4/13/2016    Procedure: COMBINED ESOPHAGOSCOPY, GASTROSCOPY, DUODENOSCOPY (EGD), DILATATION;  Surgeon: Jos Dinero MD;  Location:  GI     EYE SURGERY      bilat orbital decompression surgery      HC COLONOSCOPY W BIOPSY  1/22/2007     HC COLONOSCOPY W/WO BRUSH/WASH  12/16/04     HC CREATE EARDRUM OPENING,GEN ANESTH  12/29/88    Bilateral myringotomy with insertion of PE tubes     HC CREATE EARDRUM OPENING,GEN ANESTH  12/20/90    Bilateral myringotomy with insertion of PE tubes     HC LAP,FULGURATE/EXCISE LESIONS  06/16/08    Laparoscopy, ablation of the endometriosis and diagnostic cystoscopy     HC LAPAROSCOPY, SURGICAL, ABDOMEN, PERITONEUM & OMENTUM; DX W/ OR W/O SPECIMEN(S)  04/18/2003    Diagnostic laparoscopy     HC TYMPANOPLASTY W/O MASTOID, INIT/REV W/O OSS CHAIN RECONST  10/03/00    Left exploratory tympanoplasty, myringoplasty, fascia grafting of the oval window, microdissection via the use of operative microscope      UGI ENDOSCOPY, SIMPLE EXAM  3/23/12, 11/13/14     HERNIA REPAIR, INCISIONAL  08/23/2006    Laparoscopic mesh repair.     HYSTERECTOMY, PAP NO LONGER INDICATED  5/29/09     HYSTEROSCOPY  02/08/08     LAPAROSCOPIC GASTRECTOMY N/A 3/15/2016    Procedure: LAPAROSCOPIC GASTRECTOMY;  Surgeon: Jos Dinero MD;  Location:  OR     LAPAROSCOPIC LYSIS ADHESIONS  11/02/10    [facility]     LAPAROSCOPIC REVISION GASTROPLASTY TO ELBERT-EN-Y N/A 10/26/2015    Procedure: LAPAROSCOPIC REVISION GASTROPLASTY TO ELBERT-EN-Y;  Surgeon: Toni Wiggins MD;  Location:  OR     LAPAROSCOPIC SALPINGO-OOPHORECTOMY Left 3/2015     LAPAROSCOPY DIAGNOSTIC (GENERAL) Right 06/23/11    Evaluation of abdomen/pelvis, RSO, Cysto; [facility]            Allergies:     Allergies   Allergen Reactions     No Known Drug Allergies              Outpatient Medications:   Reviewed in EPic and verified with patient        Family History:  "    Family History   Problem Relation Age of Onset     Breast Cancer Mother      age 42 on chemo and radiation     Gynecology Mother      endometriosis     DIABETES Maternal Grandmother      type 1     Thyroid Disease Maternal Grandmother      C.A.D. Maternal Grandmother      Cardiovascular Maternal Grandmother      HEART DISEASE Maternal Grandmother      HEART DISEASE Paternal Grandmother      DIABETES Maternal Grandfather      diabetes     Thyroid Disease Maternal Aunt      HEART DISEASE Maternal Aunt      great MGA     Gynecology Sister      endometriosis     Anesthesia Reaction No family hx of              Social History:     Social History   Substance Use Topics     Smoking status: Former Smoker     Packs/day: 0.25     Years: 6.00     Types: Cigarettes     Quit date: 6/16/2015     Smokeless tobacco: Never Used      Comment: social     Alcohol use Yes      Comment: occas     Lives with  and daughter in home.           Physical Exam:   LMP 09/01/2008  /52 (BP Location: Left arm)  Pulse 59  Temp 96.8  F (36  C) (Oral)  Resp 16  Ht 1.651 m (5' 5\")  Wt 72.4 kg (159 lb 11.2 oz)  LMP 09/01/2008  SpO2 99%  BMI 26.58 kg/m2      GENERAL APPEARANCE: Mildly in distress due to pain, alert female  HEENT: PERRL, mildly dry mucous membranes  NECK: no adenopathy or asymmetry  RESP: normal respiratory effort; clear breath sounds bilaterally  CV: regular rate and rhythm; no murmurs, gallops or rubs  ABD: soft, + significant epigastric tenderness with mild tenderness of LLQ and RUQ; no rebound or guarding; bowel sounds are normal  MS: no gross deformities noted; normal muscle tone.  EXT: WWP, 2+ distal pulses  SKIN: no rash, jaundice  NEURO: no focal deficits, speech is normal          Data:   Labs (all laboratory studies reviewed by me):     CBC wnl, Hgb 13.3  LFTs wnl  Lipase 239  Occult Blood negative    Imaging (all imaging studies reviewed by me):    #. CT ABDOMEN PELVIS W Oral Contrast " 3/26/17  IMPRESSION:   1. No acute abnormality to explain abdominal pain.  2. Postsurgical changes consistent with gastric bypass      # XR ABDOMEN 2 VW 3/25/2017   IMPRESSION :. 1. No evidence for free air or bowel obstruction.  Scattered postoperative change and stable linear pelvic density  suggesting tip of a stimulator wire. Clinical correlation.    # Upper EGD ([facility]) 3/1/2017  Impression: Normal esophagus. Arely en Y gastric bypass with nodule just proximal to anastamosis. This was biopsied.     Pathology from biopsy on 3/1/17:Acutely and chronically inflamed granulation tissue, consistent with   ulcer.   - A small amount of mature nonkeratinizing squamous epithelium with   focal mild reactive change and benign fibrous stroma.   - No gastric type mucosa identified.   - Negative for malignancy.

## 2017-03-26 NOTE — ED PROVIDER NOTES
"                                                            [facility] ED Provider Note   CC:     Chief Complaint   Patient presents with     Abdominal Pain     HPI:  Lida Blood is a 31 year old female who presented to the emergency department for abdominal pain. Patient reports that she had a sudden onset of upper abdominal pain two hours ago. She states that the pain radiates into the sternum. Describes the pain as \"burning, intense stabbing\". Has also been experiencing extreme diaphoresis. She reports that the pain brought her to the knees. She has had one episode of dark emesis and dark bowel movements. Denies black/tarry in color. She reports her pain a 8/10. She don't feel short of breath she states that the pain is more heavy feeling. In 2012, she underwent a gastric bypass. She does not have a gallbladder or appendix. She also was diagnosed with a bleeding ulcer two weeks ago. Denies fever or shortness of breath. Hx Arely-en-Y gastric bypass in 2012 complicated by marginal ulcer with resection of redundant of Arely limb 2015; S/p resection of GJ ulcer 3/16 with dilatation 4/2016.  The patient had a recent upper endoscopy with Dr. Winkler on March 1.  The findings include a nodule just proximal to the anastomosis which was biopsied.  The path report reports an acute on chronic inflamed granulation tissue consistent with ulcer.  Biopsy was negative for malignancy.  Patient has been taking her omeprazole and Carafate, and was doing well until the abrupt onset of the severe pain tonight.      Problem List:  Patient Active Problem List    Diagnosis     Abdominal pain - acute epigastric     Vomiting and diarrhea     Abdominal pain, acute, epigastric     LUQ abdominal pain     Ileus (H)     Dehydration     Obesity     Gastric ulcer     Hypokalemia     Hypoglycemia     Gastroesophageal reflux disease without esophagitis     Endometriosis     Hypothyroidism     RLS (restless legs syndrome)     " Migraine headache     Physiological ovarian cysts     CARDIOVASCULAR SCREENING; LDL GOAL LESS THAN 160     Vaginal discharge     Surgery follow-up examination     Major depressive disorder, single episode, moderate (H)     Backache     Anxiety state       MEDS:   Discharge Medication List as of 3/26/2017  3:05 AM      CONTINUE these medications which have NOT CHANGED    Details   CALCIUM PO Take by mouth daily, Historical      sucralfate (CARAFATE) 1 GM/10ML suspension Take 10 mLs (1 g) by mouth 4 times daily, Disp-420 mL, R-1, Local Print      OMEPRAZOLE PO Take 40 mg by mouth 2 times daily (before meals), Historical      !! levothyroxine (SYNTHROID, LEVOTHROID) 200 MCG tablet Take 1 tablet (200 mcg) by mouth daily, Disp-30 tablet, R-1, E-Prescribe      estradiol 0.075 MG/24HR PTWK Place 1 patch onto the skin once a week, Disp-12 patch, R-3, E-Prescribe      !! levothyroxine (SYNTHROID, LEVOTHROID) 75 MCG tablet Take 1 tablet (75 mcg) by mouth daily Take along with 200 mcg tablet daily., Disp-90 tablet, R-1, E-Prescribe      Cholecalciferol (VITAMIN D PO) Take by mouth daily, Historical      escitalopram (LEXAPRO) 20 MG tablet Take 1 tablet (20 mg) by mouth daily, Disp-90 tablet, R-1, E-Prescribe       !! - Potential duplicate medications found. Please discuss with provider.          ALLERGIES:    Allergies   Allergen Reactions     No Known Drug Allergies        Past medical, surgical, family and social histories, triage and nursing notes were all reviewed.    Review of Systems   All other systems were reviewed and are negative    Physical Exam     Vitals were reviewed  Patient Vitals for the past 8 hrs:   BP Temp Temp src Heart Rate Resp SpO2 Height Weight   03/26/17 0242 121/87 97.6  F (36.4  C) Oral 66 20 97 % - -   03/26/17 0230 109/81 - - - - 96 % - -   03/26/17 0215 110/76 - - - - - - -   03/26/17 0200 113/85 - - - - 97 % - -   03/26/17 0145 130/90 - - - - - - -   03/26/17 0130 125/78 - - - - 99 % - -  "  03/26/17 0115 (!) 126/106 - - - - - - -   03/26/17 0100 (!) 138/106 - - - - 99 % - -   03/26/17 0045 127/83 - - - - 98 % - -   03/26/17 0000 (!) 136/95 - - - - 98 % - -   03/25/17 2345 (!) 128/98 - - - - 95 % - -   03/25/17 2330 112/88 - - - - 99 % - -   03/25/17 2315 (!) 119/97 - - - - 98 % - -   03/25/17 2300 (!) 131/98 - - - - 99 % - -   03/25/17 2245 (!) 122/92 - - - - 99 % - -   03/25/17 2230 127/87 - - - - 99 % - -   03/25/17 2215 (!) 135/97 - - - - 100 % - -   03/25/17 2213 - - - - - 99 % - -   03/25/17 2211 (!) 133/99 - - - - - - -   03/25/17 2108 (!) 153/124 97.8  F (36.6  C) Temporal 80 20 100 % 1.651 m (5' 5\") 70.3 kg (155 lb)     GENERAL APPEARANCE: in distress due to pain, active, alert female  FACE: normal facies  EYES: Pupils are equal  HENT: normal external exam  NECK: no adenopathy or asymmetry  RESP: normal respiratory effort; clear breath sounds bilaterally  CV: regular rate and rhythm; no significant murmurs, gallops or rubs  ABD: soft, epigastric tenderness; no rebound or guarding; bowel sounds are normal  MS: no gross deformities noted; normal muscle tone.  EXT: No calf tenderness or pitting edema  SKIN: no worrisome rash  NEURO: no facial droop; no focal deficits, speech is normal        Available Lab/Imaging Results     Results for orders placed or performed during the hospital encounter of 03/25/17 (from the past 24 hour(s))   CBC with platelets differential   Result Value Ref Range    WBC 4.4 4.0 - 11.0 10e9/L    RBC Count 4.04 3.8 - 5.2 10e12/L    Hemoglobin 13.3 11.7 - 15.7 g/dL    Hematocrit 39.1 35.0 - 47.0 %    MCV 97 78 - 100 fl    MCH 32.9 26.5 - 33.0 pg    MCHC 34.0 31.5 - 36.5 g/dL    RDW 12.8 10.0 - 15.0 %    Platelet Count 260 150 - 450 10e9/L    Diff Method Automated Method     % Neutrophils 45.6 %    % Lymphocytes 43.2 %    % Monocytes 7.6 %    % Eosinophils 2.5 %    % Basophils 1.1 %    % Immature Granulocytes 0.0 %    Absolute Neutrophil 2.0 1.6 - 8.3 10e9/L    Absolute " Lymphocytes 1.9 0.8 - 5.3 10e9/L    Absolute Monocytes 0.3 0.0 - 1.3 10e9/L    Absolute Eosinophils 0.1 0.0 - 0.7 10e9/L    Absolute Basophils 0.1 0.0 - 0.2 10e9/L    Abs Immature Granulocytes 0.0 0 - 0.4 10e9/L   Comprehensive metabolic panel   Result Value Ref Range    Sodium 144 133 - 144 mmol/L    Potassium 3.9 3.4 - 5.3 mmol/L    Chloride 106 94 - 109 mmol/L    Carbon Dioxide 32 20 - 32 mmol/L    Anion Gap 6 3 - 14 mmol/L    Glucose 82 70 - 99 mg/dL    Urea Nitrogen 16 7 - 30 mg/dL    Creatinine 0.79 0.52 - 1.04 mg/dL    GFR Estimate 83 >60 mL/min/1.7m2    GFR Estimate If Black >90   GFR Calc   >60 mL/min/1.7m2    Calcium 9.0 8.5 - 10.1 mg/dL    Bilirubin Total 0.2 0.2 - 1.3 mg/dL    Albumin 4.3 3.4 - 5.0 g/dL    Protein Total 7.7 6.8 - 8.8 g/dL    Alkaline Phosphatase 68 40 - 150 U/L    ALT 16 0 - 50 U/L    AST 17 0 - 45 U/L   ABO/Rh type and screen   Result Value Ref Range    ABO B     RH(D)  Pos     Antibody Screen Neg     Test Valid Only At [facility]     Specimen Expires 03/28/2017    Lipase   Result Value Ref Range    Lipase 239 73 - 393 U/L   XR Abdomen 2 Views    Narrative    XR ABDOMEN 2 VW 3/25/2017 10:02 PM    HISTORY: epigastric abd pain    COMPARISON: 8/16/2016 abdomen x-ray.    FINDINGS :  Postoperative changes left upper quadrant compatible with gastric  surgery. Cholecystectomy clips. There is an oval collection of  metallic densities in the mid abdomen compatible with anterior  abdominal wall mesh placement noted on the 6/17/2016 abdomen and  pelvic CT. Redemonstrated from 8/16/2016 is a linear radiopaque  structure left pelvis suggesting a portion of a stimulator wire or  lead. Few round pelvic calcifications are likely phleboliths.  Nonobstructive bowel gas pattern. No free air.      Impression    IMPRESSION :. 1. No evidence for free air or bowel obstruction.  Scattered postoperative change and stable linear pelvic density  suggesting tip of a stimulator  wire. Clinical correlation.,    TOBI GARNETT MD   CT Abdomen Pelvis w/o Contrast    Narrative    CT ABDOMEN PELVIS W/O CONTRAST 3/26/2017 12:25 AM    HISTORY: Intractable epigastric pain. Past surgical history includes  hysterectomy, appendectomy, hernia repair, Arely-en-Y gastric bypass  and cholecystectomy.    COMPARISON: 8/17/2016    TECHNIQUE: Oral contrast was administered for abdomen and pelvis CT.   Radiation dose for this scan was reduced using automated exposure  control, adjustment of the mA and/or kV according to patient size, or  iterative reconstruction technique.    FINDINGS: Lung bases are clear.    Postsurgical changes consistent with gastric bypass are evident in the  left upper quadrant. No evidence of bowel obstruction. No free  intraperitoneal air. No free or loculated intraperitoneal fluid  collection.    Within limits of noncontrast technique: Liver, spleen, pancreas,  adrenal glands and kidneys are unremarkable.    The uterus appears to be absent. Urinary bladder is collapsed. No  significant free fluid in the pelvis.      Impression    IMPRESSION:   1. No acute abnormality to explain abdominal pain.  2. Postsurgical changes consistent with gastric bypass.    ADORE REYNA MD   Occult blood stool   Result Value Ref Range    Occult Blood Negative NEG       Impression     Final diagnoses:   Abdominal pain, epigastric   Intractable abdominal pain       ED Course & Medical Decision Making   Lida Blood is a 31 year old female who presented to the emergency department with acute onset of severe sudden epigastric pain that started 2 hours ago.  Patient started to feel a little queasy after lunch, and did not eat supper.  She then developed sudden onset of a burning, intense stabbing pain in the epigastric region with radiation to the right back and scapula.  She had a single episode of the dark-colored emesis and reports having had a dark bowel movement earlier.  She denies any melena,  hematochezia or hematemesis.  She had a gastric ulcer, that was noted to weeks ago.  She is is on omeprazole and Carafate which was helping.  She has had gastric bypass surgery in 2012, cholecystectomy and appendectomy.    10:53 PM: Patient is receiving her 3rd dose of Dilaudid with very little improvement in her pain.  Her laboratory workup is reassuring including a CBC, comprehensive metabolic panel, and an x-ray revealing no evidence for free air or bowel obstruction.  We will proceed with a CT scan of the abdomen with oral contrast to help delineate any other serious pathology.  Patient will receive additional Dilaudid, and low-dose Ativan to help with pain control.    12:55 AM: Discussed CT results with the patient.  She is still having moderate to severe pain, but definitely more comfortable than when she 1st presented.  Hemoccult obtained and is negative.  Patient has never had this type of pain before, and is concerning for the possibility of acute recurrent ulcer despite recent treatment with omeprazole and Carafate.  Patient is in agreement with transfer to the [facility] for further evaluation and treatment of her persistent epigastric pain.    1:40 AM: Discussed the case with Dr. Feliciano at the [facility] who has accepted care for the patient.  He would like us to add a lipase and lactic acid level, as well as try a GI cocktail.  Patient states that she has tried these in the past but it has not helped.  We will nevertheless have her try again.  We are arranging for ambulance transport to the [facility].  Patient has remained vitally stable.  We will cover her with Protonix bolus, and drip.          This document serves as a record of services personally performed by Wally Bear MD. It was created on their behalf by Bebe De Jesus, a trained medical scribe. The creation of this record is based on the provider's personal observations and the statements of the  patient. This document has been checked and approved by the attending provider.          This note was completed in part using Dragon voice recognition, and may contain word and grammatical errors.        Wally Bear MD  03/26/17 0438       Wally Bear MD  03/26/17 0438

## 2017-03-26 NOTE — PLAN OF CARE
Problem: Goal Outcome Summary  Goal: Goal Outcome Summary  Outcome: No Change  D: Resting comfortably on and off, family visiting. Pain controlled with IV pain medication. She was seen by GI and Bariatric surgery as well as the medicine team. No nausea noted, up to bathroom herself. IVF inf infusing as she is NPO yet.

## 2017-03-26 NOTE — ED NOTES
Pt reports pain increased and is having a hard time drinking the oral contrast. IV pain medication given.

## 2017-03-26 NOTE — ED NOTES
"Pt reports the pain medication was helpful but she is now having a sharp pain that shoots \"straight into by back.\"  "

## 2017-03-26 NOTE — ED NOTES
States a sudden onset of upper abd pain about 2 hours ago with one episode of dark emesis. Was diagnosed with a bleeding ulcer 2 weeks ago.

## 2017-03-26 NOTE — IP AVS SNAPSHOT
[address] [address]    Phone:  [phone]                                       After Visit Summary   3/26/2017    Lida Blood    MRN: [id number]           After Visit Summary Signature Page     I have received my discharge instructions, and my questions have been answered. I have discussed any challenges I see with this plan with the nurse or doctor.    ..........................................................................................................................................  Patient/Patient Representative Signature      ..........................................................................................................................................  Patient Representative Print Name and Relationship to Patient    ..................................................               ................................................  Date                                            Time    ..........................................................................................................................................  Reviewed by Signature/Title    ...................................................              ..............................................  Date                                                            Time

## 2017-03-26 NOTE — IP AVS SNAPSHOT
MRN:[id number]                      After Visit Summary   3/26/2017    Lida Blood    MRN: [id number]           Thank you!     Thank you for choosing [facility] for your care. Our goal is always to provide you with excellent care. Hearing back from our patients is one way we can continue to improve our services. Please take a few minutes to complete the written survey that you may receive in the mail after you visit with us. Thank you!        Patient Information     Date Of Birth          1985        About your hospital stay     You were admitted on:  March 26, 2017 You last received care in the:  [address] [facility]    You were discharged on:  March 27, 2017        Reason for your hospital stay       You were admitted for abdominal pain. You were seen by GI and your surgeon. You were treated with medications to help your pain and the pain was tolerable enough to be discharged form the hospital                  Who to Call     For medical emergencies, please call 911.  For non-urgent questions about your medical care, please call your primary care provider or clinic, [phone]          Attending Provider     Provider Specialty    Mary Feliciano MD Internal Medicine    [facility]       Primary Care Provider Office Phone # Fax #    Joaquin Dudley [phone]       [facility] [address] [place] [address]         When to contact your care team       Worsening abdominal pain, nausea or vomiting                  After Care Instructions     Activity       Your activity upon discharge: activity as tolerated            Diet       Follow this diet upon discharge:     [place] diet for the next few days then a regular diet                  Follow-up Appointments     Adult [facility] Follow-up and recommended labs and tests       1. With Dr. Whitman in the Gastroenterology Clinic to follow-up this hospital stay - as  "soon as possible    Appointments on [place] and/or [facility] (with [facility] or [facility] provider or service). Call [phone] if you haven't heard regarding these appointments within 7 days of discharge.                  Pending Results     No orders found for last 3 day(s).            Statement of Approval     Ordered          03/27/17 1416  I have reviewed and agree with all the recommendations and orders detailed in this document.  EFFECTIVE NOW     Approved and electronically signed by:  Willian Edwards MD             Admission Information     Date & Time Provider Department Dept. Phone    3/26/2017 Willian Edwards MD [address] [facility] [place] [phone]      Your Vitals Were     Blood Pressure Pulse Temperature Respirations Height Weight    109/70 (BP Location: Left arm) 62 97  F (36.1  C) (Oral) 16 1.651 m (5' 5\") 72.4 kg (159 lb 11.2 oz)    Last Period Pulse Oximetry BMI (Body Mass Index)             09/01/2008 94% 26.58 kg/m2         [employer] Information     [employer] gives you secure access to your electronic health record. If you see a primary care provider, you can also send messages to your care team and make appointments. If you have questions, please call your primary care clinic.  If you do not have a primary care provider, please call [phone] and they will assist you.        Care EveryWhere ID     This is your Care EveryWhere ID. This could be used by other organizations to access your [facility] medical records  [id number]           Review of your medicines      START taking        Dose / Directions    dicyclomine 20 MG tablet   Commonly known as:  BENTYL   Used for:  Abdominal pain, acute, epigastric        Dose:  20 mg   Take 1 tablet (20 mg) by mouth 4 times daily (before meals and nightly)   Quantity:  120 tablet   Refills:  0       misoprostol 200 MCG tablet   Commonly known as:  CYTOTEC   Used for:  Abdominal pain, acute, epigastric        Dose:  200 mcg   Take 1 tablet (200 " mcg) by mouth every 6 hours   Quantity:  120 tablet   Refills:  0       oxyCODONE 5 MG IR tablet   Commonly known as:  ROXICODONE   Used for:  Abdominal pain, acute, epigastric        Dose:  5-10 mg   Take 1-2 tablets (5-10 mg) by mouth every 4 hours as needed for moderate to severe pain   Quantity:  60 tablet   Refills:  0         CONTINUE these medicines which have NOT CHANGED        Dose / Directions    CALCIUM PO        Take by mouth daily   Refills:  0       escitalopram 20 MG tablet   Commonly known as:  LEXAPRO   Used for:  Major depressive disorder, single episode, moderate (H)        Dose:  20 mg   Take 1 tablet (20 mg) by mouth daily   Quantity:  90 tablet   Refills:  1       estradiol 0.075 MG/24HR Ptwk   Used for:  Symptomatic menopausal or female climacteric states        Dose:  1 patch   Place 1 patch onto the skin once a week   Quantity:  12 patch   Refills:  3       * levothyroxine 75 MCG tablet   Commonly known as:  SYNTHROID/LEVOTHROID   Used for:  Acquired hypothyroidism        Dose:  75 mcg   Take 1 tablet (75 mcg) by mouth daily Take along with 200 mcg tablet daily.   Quantity:  90 tablet   Refills:  1       * levothyroxine 200 MCG tablet   Commonly known as:  SYNTHROID/LEVOTHROID   Used for:  Acquired hypothyroidism        Dose:  200 mcg   Take 1 tablet (200 mcg) by mouth daily   Quantity:  30 tablet   Refills:  1       OMEPRAZOLE PO        Dose:  40 mg   Take 40 mg by mouth 2 times daily (before meals)   Refills:  0       sucralfate 1 GM/10ML suspension   Commonly known as:  CARAFATE        Dose:  1 g   Take 10 mLs (1 g) by mouth 4 times daily   Quantity:  420 mL   Refills:  1       VITAMIN D PO        Take by mouth daily   Refills:  0       * Notice:  This list has 2 medication(s) that are the same as other medications prescribed for you. Read the directions carefully, and ask your doctor or other care provider to review them with you.         Where to get your medicines      These  medications were sent to [facility] [place], [place] - [address]   [address] , [facility] [address]     Phone:  [phone]     dicyclomine 20 MG tablet    misoprostol 200 MCG tablet         Some of these will need a paper prescription and others can be bought over the counter. Ask your nurse if you have questions.     Bring a paper prescription for each of these medications     oxyCODONE 5 MG IR tablet                Protect others around you: Learn how to safely use, store and throw away your medicines at www.disposemymeds.org.             Medication List: This is a list of all your medications and when to take them. Check marks below indicate your daily home schedule. Keep this list as a reference.      Medications           Morning Afternoon Evening Bedtime As Needed    CALCIUM PO   Take by mouth daily                                dicyclomine 20 MG tablet   Commonly known as:  BENTYL   Take 1 tablet (20 mg) by mouth 4 times daily (before meals and nightly)   Last time this was given:  20 mg on 3/27/2017  1:41 PM                                escitalopram 20 MG tablet   Commonly known as:  LEXAPRO   Take 1 tablet (20 mg) by mouth daily   Last time this was given:  20 mg on 3/27/2017  8:53 AM                                estradiol 0.075 MG/24HR Ptwk   Place 1 patch onto the skin once a week   Last time this was given:  1 patch on 3/27/2017  1:42 PM                                * levothyroxine 75 MCG tablet   Commonly known as:  SYNTHROID/LEVOTHROID   Take 1 tablet (75 mcg) by mouth daily Take along with 200 mcg tablet daily.   Last time this was given:  275 mcg on 3/27/2017  8:53 AM                                * levothyroxine 200 MCG tablet   Commonly known as:  SYNTHROID/LEVOTHROID   Take 1 tablet (200 mcg) by mouth daily   Last time this was given:  275 mcg on 3/27/2017  8:53 AM                                misoprostol 200 MCG tablet   Commonly known as:  CYTOTEC   Take 1 tablet (200  mcg) by mouth every 6 hours   Last time this was given:  200 mcg on 3/27/2017  1:41 PM                                OMEPRAZOLE PO   Take 40 mg by mouth 2 times daily (before meals)                                oxyCODONE 5 MG IR tablet   Commonly known as:  ROXICODONE   Take 1-2 tablets (5-10 mg) by mouth every 4 hours as needed for moderate to severe pain   Last time this was given:  10 mg on 3/27/2017  1:41 PM                                sucralfate 1 GM/10ML suspension   Commonly known as:  CARAFATE   Take 10 mLs (1 g) by mouth 4 times daily   Last time this was given:  1 g on 3/27/2017 11:57 AM                                VITAMIN D PO   Take by mouth daily                                * Notice:  This list has 2 medication(s) that are the same as other medications prescribed for you. Read the directions carefully, and ask your doctor or other care provider to review them with you.

## 2017-03-27 ENCOUNTER — CARE COORDINATION (OUTPATIENT)
Dept: CARDIOLOGY | Facility: CLINIC | Age: 32
End: 2017-03-27

## 2017-03-27 VITALS
WEIGHT: 159.7 LBS | OXYGEN SATURATION: 94 % | RESPIRATION RATE: 16 BRPM | DIASTOLIC BLOOD PRESSURE: 70 MMHG | HEIGHT: 65 IN | TEMPERATURE: 97 F | SYSTOLIC BLOOD PRESSURE: 109 MMHG | BODY MASS INDEX: 26.61 KG/M2 | HEART RATE: 62 BPM

## 2017-03-27 LAB
ANION GAP SERPL CALCULATED.3IONS-SCNC: 4 MMOL/L (ref 3–14)
APTT PPP: 28 SEC (ref 22–37)
BUN SERPL-MCNC: 10 MG/DL (ref 7–30)
CALCIUM SERPL-MCNC: 8.5 MG/DL (ref 8.5–10.1)
CHLORIDE SERPL-SCNC: 104 MMOL/L (ref 94–109)
CO2 SERPL-SCNC: 30 MMOL/L (ref 20–32)
CREAT SERPL-MCNC: 0.92 MG/DL (ref 0.52–1.04)
CRP SERPL-MCNC: <2.9 MG/L (ref 0–8)
ERYTHROCYTE [DISTWIDTH] IN BLOOD BY AUTOMATED COUNT: 12.8 % (ref 10–15)
ERYTHROCYTE [DISTWIDTH] IN BLOOD BY AUTOMATED COUNT: NORMAL % (ref 10–15)
GFR SERPL CREATININE-BSD FRML MDRD: 70 ML/MIN/1.7M2
GLUCOSE SERPL-MCNC: 85 MG/DL (ref 70–99)
HCT VFR BLD AUTO: 32.2 % (ref 35–47)
HCT VFR BLD AUTO: NORMAL % (ref 35–47)
HGB BLD-MCNC: 10.5 G/DL (ref 11.7–15.7)
HGB BLD-MCNC: NORMAL G/DL (ref 11.7–15.7)
INR PPP: 0.98 (ref 0.86–1.14)
INTERPRETATION ECG - MUSE: NORMAL
MCH RBC QN AUTO: 32.5 PG (ref 26.5–33)
MCH RBC QN AUTO: NORMAL PG (ref 26.5–33)
MCHC RBC AUTO-ENTMCNC: 32.6 G/DL (ref 31.5–36.5)
MCHC RBC AUTO-ENTMCNC: NORMAL G/DL (ref 31.5–36.5)
MCV RBC AUTO: 100 FL (ref 78–100)
MCV RBC AUTO: NORMAL FL (ref 78–100)
PLATELET # BLD AUTO: 217 10E9/L (ref 150–450)
PLATELET # BLD AUTO: NORMAL 10E9/L (ref 150–450)
POTASSIUM SERPL-SCNC: 4.6 MMOL/L (ref 3.4–5.3)
RBC # BLD AUTO: 3.23 10E12/L (ref 3.8–5.2)
RBC # BLD AUTO: NORMAL 10E12/L (ref 3.8–5.2)
SODIUM SERPL-SCNC: 138 MMOL/L (ref 133–144)
WBC # BLD AUTO: 3.5 10E9/L (ref 4–11)
WBC # BLD AUTO: NORMAL 10E9/L (ref 4–11)

## 2017-03-27 PROCEDURE — S0191 MISOPROSTOL, ORAL, 200 MCG: HCPCS | Performed by: SURGERY

## 2017-03-27 PROCEDURE — 25000128 H RX IP 250 OP 636: Performed by: INTERNAL MEDICINE

## 2017-03-27 PROCEDURE — 25000128 H RX IP 250 OP 636: Performed by: HOSPITALIST

## 2017-03-27 PROCEDURE — 25000125 ZZHC RX 250: Performed by: HOSPITALIST

## 2017-03-27 PROCEDURE — 25900017 H RX MED GY IP 259 OP 259 PS 637: Performed by: SURGERY

## 2017-03-27 PROCEDURE — 25000132 ZZH RX MED GY IP 250 OP 250 PS 637: Performed by: INTERNAL MEDICINE

## 2017-03-27 PROCEDURE — 25000132 ZZH RX MED GY IP 250 OP 250 PS 637: Performed by: HOSPITALIST

## 2017-03-27 PROCEDURE — 86140 C-REACTIVE PROTEIN: CPT | Performed by: HOSPITALIST

## 2017-03-27 PROCEDURE — 85027 COMPLETE CBC AUTOMATED: CPT | Performed by: HOSPITALIST

## 2017-03-27 PROCEDURE — 85610 PROTHROMBIN TIME: CPT | Performed by: HOSPITALIST

## 2017-03-27 PROCEDURE — 85730 THROMBOPLASTIN TIME PARTIAL: CPT | Performed by: HOSPITALIST

## 2017-03-27 PROCEDURE — 36415 COLL VENOUS BLD VENIPUNCTURE: CPT | Performed by: HOSPITALIST

## 2017-03-27 PROCEDURE — 80048 BASIC METABOLIC PNL TOTAL CA: CPT | Performed by: HOSPITALIST

## 2017-03-27 PROCEDURE — 25000125 ZZHC RX 250: Performed by: INTERNAL MEDICINE

## 2017-03-27 PROCEDURE — 40000141 ZZH STATISTIC PERIPHERAL IV START W/O US GUIDANCE

## 2017-03-27 PROCEDURE — 99238 HOSP IP/OBS DSCHRG MGMT 30/<: CPT | Performed by: HOSPITALIST

## 2017-03-27 RX ORDER — HYDROMORPHONE HYDROCHLORIDE 1 MG/ML
.3-.5 INJECTION, SOLUTION INTRAMUSCULAR; INTRAVENOUS; SUBCUTANEOUS
Status: DISCONTINUED | OUTPATIENT
Start: 2017-03-27 | End: 2017-03-27 | Stop reason: HOSPADM

## 2017-03-27 RX ORDER — SIMETHICONE 80 MG
80 TABLET,CHEWABLE ORAL EVERY 6 HOURS PRN
Status: DISCONTINUED | OUTPATIENT
Start: 2017-03-27 | End: 2017-03-27 | Stop reason: HOSPADM

## 2017-03-27 RX ORDER — HYDROMORPHONE HYDROCHLORIDE 1 MG/ML
0.5 INJECTION, SOLUTION INTRAMUSCULAR; INTRAVENOUS; SUBCUTANEOUS ONCE
Status: COMPLETED | OUTPATIENT
Start: 2017-03-27 | End: 2017-03-27

## 2017-03-27 RX ORDER — MISOPROSTOL 200 UG/1
200 TABLET ORAL EVERY 6 HOURS
Qty: 120 TABLET | Refills: 0 | Status: SHIPPED | OUTPATIENT
Start: 2017-03-27 | End: 2017-08-10

## 2017-03-27 RX ORDER — DICYCLOMINE HCL 20 MG
20 TABLET ORAL
Status: DISCONTINUED | OUTPATIENT
Start: 2017-03-27 | End: 2017-03-27 | Stop reason: HOSPADM

## 2017-03-27 RX ORDER — DICYCLOMINE HCL 20 MG
20 TABLET ORAL
Qty: 120 TABLET | Refills: 0 | Status: SHIPPED | OUTPATIENT
Start: 2017-03-27 | End: 2017-05-24

## 2017-03-27 RX ORDER — OXYCODONE HYDROCHLORIDE 5 MG/1
5-10 TABLET ORAL EVERY 4 HOURS PRN
Qty: 60 TABLET | Refills: 0 | Status: SHIPPED | OUTPATIENT
Start: 2017-03-27 | End: 2017-05-24

## 2017-03-27 RX ADMIN — SENNOSIDES AND DOCUSATE SODIUM 2 TABLET: 8.6; 5 TABLET ORAL at 08:53

## 2017-03-27 RX ADMIN — ACETAMINOPHEN 650 MG: 325 TABLET, FILM COATED ORAL at 05:25

## 2017-03-27 RX ADMIN — MISOPROSTOL 200 MCG: 200 TABLET ORAL at 00:26

## 2017-03-27 RX ADMIN — DICYCLOMINE HYDROCHLORIDE 20 MG: 20 TABLET ORAL at 13:41

## 2017-03-27 RX ADMIN — OXYCODONE HYDROCHLORIDE 10 MG: 5 TABLET ORAL at 13:41

## 2017-03-27 RX ADMIN — SUCRALFATE 1 G: 1 SUSPENSION ORAL at 11:57

## 2017-03-27 RX ADMIN — HYDROMORPHONE HYDROCHLORIDE 0.2 MG: 10 INJECTION, SOLUTION INTRAMUSCULAR; INTRAVENOUS; SUBCUTANEOUS at 01:28

## 2017-03-27 RX ADMIN — SUCRALFATE 1 G: 1 SUSPENSION ORAL at 08:53

## 2017-03-27 RX ADMIN — SIMETHICONE CHEW TAB 80 MG 80 MG: 80 TABLET ORAL at 11:43

## 2017-03-27 RX ADMIN — OXYCODONE HYDROCHLORIDE 10 MG: 5 TABLET ORAL at 03:12

## 2017-03-27 RX ADMIN — HYDROMORPHONE HYDROCHLORIDE 0.5 MG: 10 INJECTION, SOLUTION INTRAMUSCULAR; INTRAVENOUS; SUBCUTANEOUS at 08:52

## 2017-03-27 RX ADMIN — SODIUM CHLORIDE: 9 INJECTION, SOLUTION INTRAVENOUS at 00:27

## 2017-03-27 RX ADMIN — ACETAMINOPHEN 650 MG: 325 TABLET, FILM COATED ORAL at 00:34

## 2017-03-27 RX ADMIN — OXYCODONE HYDROCHLORIDE 10 MG: 5 TABLET ORAL at 08:04

## 2017-03-27 RX ADMIN — ONDANSETRON 4 MG: 4 TABLET, ORALLY DISINTEGRATING ORAL at 00:38

## 2017-03-27 RX ADMIN — LEVOTHYROXINE SODIUM 275 MCG: 150 TABLET ORAL at 08:53

## 2017-03-27 RX ADMIN — ESTRADIOL 1 PATCH: 0.07 PATCH TRANSDERMAL at 13:42

## 2017-03-27 RX ADMIN — HYDROXYZINE HYDROCHLORIDE 10 MG: 10 TABLET ORAL at 05:17

## 2017-03-27 RX ADMIN — MISOPROSTOL 200 MCG: 200 TABLET ORAL at 13:41

## 2017-03-27 RX ADMIN — MISOPROSTOL 200 MCG: 200 TABLET ORAL at 05:50

## 2017-03-27 RX ADMIN — HYDROMORPHONE HYDROCHLORIDE 0.5 MG: 10 INJECTION, SOLUTION INTRAMUSCULAR; INTRAVENOUS; SUBCUTANEOUS at 11:57

## 2017-03-27 RX ADMIN — HYDROMORPHONE HYDROCHLORIDE 0.5 MG: 10 INJECTION, SOLUTION INTRAMUSCULAR; INTRAVENOUS; SUBCUTANEOUS at 05:47

## 2017-03-27 RX ADMIN — OXYCODONE HYDROCHLORIDE 5 MG: 5 TABLET ORAL at 00:35

## 2017-03-27 RX ADMIN — PANTOPRAZOLE SODIUM 40 MG: 40 INJECTION, POWDER, FOR SOLUTION INTRAVENOUS at 05:10

## 2017-03-27 RX ADMIN — ESCITALOPRAM OXALATE 20 MG: 20 TABLET ORAL at 08:53

## 2017-03-27 RX ADMIN — SODIUM CHLORIDE: 9 INJECTION, SOLUTION INTRAVENOUS at 09:37

## 2017-03-27 RX ADMIN — HYDROMORPHONE HYDROCHLORIDE 0.2 MG: 10 INJECTION, SOLUTION INTRAMUSCULAR; INTRAVENOUS; SUBCUTANEOUS at 05:24

## 2017-03-27 NOTE — PROVIDER NOTIFICATION
The patient is stoic, but crying, and pain level has not been decreased to a tolerable level by the medication doses available and given. Paged [facility] [phone] and talked to Dr. Feliciano. Orders received for increased dose of Dilaudid and a one time dose of IV Dilaudid.

## 2017-03-27 NOTE — PLAN OF CARE
Problem: Goal Outcome Summary  Goal: Goal Outcome Summary  Outcome: No Change  Patient is alert and oriented times 4. Still having mid upper abdominal pain. Tried 5 mg of oxycodone at first, but she needed a second dose 45 minutes later which was then effective. Order to start clear liquid diet. Patient only wanted water and juice so far and she is tolerating well. Started new medication for her ulcer (misoprostol) after patient handout was given and it was verified that there is no was she could be pregnant, she had a hysterectomy in the past. On IV protonix, and carafate as well.  IV fluids continue at 100 ml per hour via right AC PIV. Patient is up ad bere to the bathroom. Has itchiness from most narcotics. Reports thighs and back and arms itch. Atarax given once. Lotion also provided. Continue current plan of care.

## 2017-03-27 NOTE — PLAN OF CARE
Problem: Goal Outcome Summary  Goal: Goal Outcome Summary  Outcome: Adequate for Discharge Date Met:  03/27/17  Patient discharged. IV access dc'd. Discharge papers reviewed with and signed by patient and RN. Patient left unit at 14:40.

## 2017-03-27 NOTE — DISCHARGE SUMMARY
Inpatient Internal Medicine Discharge Summary    Date of Admission: 3/26/2017  Date of Discharge: 3/27/2017    Discharge Diagnosis:   1. Acute on chronic abdominal pain  2. History of a RNY Gastric Bypass  3. History of marginal ulcer s/p resection    Procedures During Hospitalization:   1. CT of the Abdomen and Pelvis obtained on 3/25 demonstrated 1. No acute abnormality to explain abdominal pain. 2. Postsurgical changes consistent with gastric bypass    Consulting Services:   1. Gastroenterology  2. Bariatric Surgery    History of Present Illness: Lida Blood is a 31 year old female with a PMHx significant for RNY gastric bypass in 2012 complicated by marginal ulcer with resection of redundant Arely limb in 2015, GJ ulcer s/p resection in 3/16, Endometriosis s/p hysterectomy (about 11 years ago) and oophorectomy (~3 years ago) who presented to [facility] as a transfer with sudden onset of severe epigastric pain.    Hospital Course:   Lida was admitted to the hospital and made npo. She was evaluated by the Bariatric Surgery and the inpatient Gastroenterology Consult Service. Neither team recommended an upper endoscopy as she had had an EGD about 3 weeks prior to her admission. She was started on Bentyl and Misoprostol and maintained on oral narcotics and was at the time of her discharge able to drink enough to stay hydrated. We did discuss that she may have functional abdominal pain as her medical workup was entirely negative and so she may benefit from being seen as an outpatient by one of our general Gastroenterology Staff for continued management of her chronic abdominal pain.    Discharge Physical Exam:   B/P: 109/70, T: 97, P: 62, R: 16  GEN: pleasant, in mild distress at times  CHEST: CTA B  CV: RRR  ABD: soft, tender ithe RUQ, + BS  EXT: no edema    Discharge Medications:      Review of your medicines      START taking       Dose / Directions    dicyclomine 20 MG tablet   Commonly known as:  BENTYL    Used for:  Abdominal pain, acute, epigastric        Dose:  20 mg   Take 1 tablet (20 mg) by mouth 4 times daily (before meals and nightly)   Quantity:  120 tablet   Refills:  0       misoprostol 200 MCG tablet   Commonly known as:  CYTOTEC   Used for:  Abdominal pain, acute, epigastric        Dose:  200 mcg   Take 1 tablet (200 mcg) by mouth every 6 hours   Quantity:  120 tablet   Refills:  0       oxyCODONE 5 MG IR tablet   Commonly known as:  ROXICODONE   Used for:  Abdominal pain, acute, epigastric        Dose:  5-10 mg   Take 1-2 tablets (5-10 mg) by mouth every 4 hours as needed for moderate to severe pain   Quantity:  60 tablet   Refills:  0         CONTINUE these medicines which have NOT CHANGED       Dose / Directions    CALCIUM PO        Take by mouth daily   Refills:  0       escitalopram 20 MG tablet   Commonly known as:  LEXAPRO   Used for:  Major depressive disorder, single episode, moderate (H)        Dose:  20 mg   Take 1 tablet (20 mg) by mouth daily   Quantity:  90 tablet   Refills:  1       estradiol 0.075 MG/24HR Ptwk   Used for:  Symptomatic menopausal or female climacteric states        Dose:  1 patch   Place 1 patch onto the skin once a week   Quantity:  12 patch   Refills:  3       * levothyroxine 75 MCG tablet   Commonly known as:  SYNTHROID/LEVOTHROID   Used for:  Acquired hypothyroidism        Dose:  75 mcg   Take 1 tablet (75 mcg) by mouth daily Take along with 200 mcg tablet daily.   Quantity:  90 tablet   Refills:  1       * levothyroxine 200 MCG tablet   Commonly known as:  SYNTHROID/LEVOTHROID   Used for:  Acquired hypothyroidism        Dose:  200 mcg   Take 1 tablet (200 mcg) by mouth daily   Quantity:  30 tablet   Refills:  1       OMEPRAZOLE PO        Dose:  40 mg   Take 40 mg by mouth 2 times daily (before meals)   Refills:  0       sucralfate 1 GM/10ML suspension   Commonly known as:  CARAFATE   Used for:  Epigastric pain        Dose:  1 g   Take 10 mLs (1 g) by mouth 4 times  daily   Quantity:  420 mL   Refills:  1       VITAMIN D PO        Take by mouth daily   Refills:  0       * Notice:  This list has 2 medication(s) that are the same as other medications prescribed for you. Read the directions carefully, and ask your doctor or other care provider to review them with you.         Where to get your medicines      These medications were sent to [facility] [place], [place] - [address]   [address] [facility] [address]     Phone:  [phone]      dicyclomine 20 MG tablet     misoprostol 200 MCG tablet         Some of these will need a paper prescription and others can be bought over the counter. Ask your nurse if you have questions.     Bring a paper prescription for each of these medications      oxyCODONE 5 MG IR tablet           Discharge Follow-up:   1. Follow-up with your PCP as needed  2. Follow-up with Gastroenterology to follow-up this hospital stay and discuss treatments for functional abdominal pain    Dr. Willian Edwards MD MPH  Internal Medicine and Pediatric Hospitalist  [phone]

## 2017-03-27 NOTE — PROGRESS NOTES
Internal Medicine Daily Progress    S: stable overnight without acute events. Continues to have pain that is a bit better - more spaced out, not as severe. Was able to get some rest last night but still pain is significant. Had some bowel movements today and yesterday, able to drink - still no appetite. No fevers or chills.    O:  B/P: 109/70, T: 97, P: 62, R: 16  GEN: tearful, in moderate pain  CHEST: CTA B  CV: RRR  ABD: soft, active bowel sounds, mild tenderness to palpation mainlyy in the RUQ    Assessment and Plan: Lida Blood is a 31 year old female with a PMHx significant for RNY gastric bypass in 2012 complicated by marginal ulcer with resection of redundant Arely limb in 2015, GJ ulcer s/p resection in 3/16, Endometriosis s/p hysterectomy (about 11 years ago) and oophorectomy (~3 years ago) who presented to [facility] as a transfer with sudden onset of severe epigastric pain.    1. Epigastric Pain: Unclear etiology at this time. Less likely an ulcer as recent scope a few weeks ago.  - Seen by bariatric surgery who recommends Misoprostol  - Seen by GI - recommending Bentyl.  - will try simethicone as well as continue with IV and PO narcotics    2. Dispo: when pain is better, able to eat and drink    The patient is Full Code    Dr. Willian Edwards MD MPH  Internal Medicine and Pediatric Hospitalist  [phone]

## 2017-03-27 NOTE — PLAN OF CARE
"Problem: Goal Outcome Summary  Goal: Goal Outcome Summary  Outcome: No Change  Patient c/o abdominal pain that \" comes and goes, yet becomes intense at times\". She has alternated oxycodone and IV dilaudid, with minimal relief reported. She was seen by GI Medicine and Bariatric Surgery. Neither could answer why she is having pain. Labs and imaging have been \"normal\". This afternoon, patient told this RN that she wants to go home today. This information was relayed to Dr Edwards. He stated he will see her this afternoon, give her a better pain regimen to follow at home, and discharge her. No orders yet. Keep patient and family informed about plans.      "

## 2017-03-27 NOTE — PROGRESS NOTES
GASTROENTEROLOGY PROGRESS NOTE    ASSESSMENT:  Lida Blood is a 31 year old female with a history of with hx of CCY, appendectomy, hysterectomy, as well as RNYGB done in [facility] in 2012, complicated by marginal ulcer s/p resection the Arely limb, she also has chronic ulcer at the GJ anastomosis site s/p resection 3/2016, Graves disease, presented with acute onset abdominal pain. Her labs, including CBC, BMP, lipase, LFT, lactate all fairly unremarkable, CT only showed post surgical changes, nothing to explain her pain. She has had recent EGD done which showed small nodule with ulcer like material on biopsy.     It is not clear the etiology of patient's pain, there was no blood in her emesis or BM and her hemoglobin is stable, which makes unlikely this be a GI bleed. She could have adhesions 2/2 to her complicated surgical history, or internal hernia not seen on CT scan, the onset of pain during physical lifting is suggestive of musculoskeletal pain. The severity of the pain, the acute onset, makes the ulcer as the cause of her pain somewhat unlikely, as she has struggled with what is thought to be anastomotic ulcers likely 2/2 to ischemia since her initial surgery.     RECOMMENDATIONS:  - Advance diet as tolerated   - Bowel regimen (Miralax/Enema), titrate to one soft and easy to pass stool per day  - Consider adding Bentyl   - Agree simethicone as the moving pain around the abdomen might be related to gas pain  - Continue home regimen of BID PPI and Carafate   - Ambulate if possible  - GI team is signing off patient to primary team        Patient care plan discussed with Dr. Waddell, GI staff physician. Thank you for involving us in this patient's care. Please do not hesitate to contact the GI service with any questions or concerns.     Ned Martinez  Gastroenterology Nurse Practitioner  _______________________________________________________________  S: Pain is sharp and different than her  "usual previous gastric ulcer pain. This pain moves around and no relief.     O:  Blood pressure 109/70, pulse 62, temperature 97  F (36.1  C), temperature source Oral, resp. rate 16, height 1.651 m (5' 5\"), weight 72.4 kg (159 lb 11.2 oz), last menstrual period 09/01/2008, SpO2 94 %, not currently breastfeeding.    Constitutional: cooperative, mild distress, appears very uncomfortable  Eyes: Sclera anicteric/injected  Ears/nose/mouth/throat: Normal oropharynx without ulcers or exudate, mucus membranes moist, hearing intact  CV: No edema  Respiratory: Unlabored breathing  Abd: non distended, previous surgical scars noted, ttp generalized but worse on left side  Skin: warm, perfused, no jaundice  Neuro: AAO x 3, No asterixis  Psych: Normal affect  MSK: Normal gait    LABS:  BMP  Recent Labs  Lab 03/27/17  0613 03/25/17  2130    144   POTASSIUM 4.6 3.9   CHLORIDE 104 106   OMID 8.5 9.0   CO2 30 32   BUN 10 16   CR 0.92 0.79   GLC 85 82     CBC  Recent Labs  Lab 03/27/17  0707 03/27/17  0613 03/25/17  2130   WBC 3.5* Unsatisfactory specimen - clottedDIANE NEREIDA,0637 03/27/17 EAB 4.4   RBC 3.23* Unsatisfactory specimen - clottedDIANE NEREIDA,0637 03/27/17 EAB 4.04   HGB 10.5* Unsatisfactory specimen - clottedDIANE NEREIDA,0637 03/27/17 EAB 13.3   HCT 32.2* Unsatisfactory specimen - clottedDIANE NEREIDA,0637 03/27/17 EAB 39.1    Unsatisfactory specimen - clottedDIANE NEREIDA,0637 03/27/17 EAB 97   MCH 32.5 Unsatisfactory specimen - clottedDIANE NEREIDA,0637 03/27/17 EAB 32.9   MCHC 32.6 Unsatisfactory specimen - clottedDIANE NEREIDA,0637 03/27/17 EAB 34.0   RDW 12.8 Unsatisfactory specimen - clottedDIANE NEREIDA,0637 03/27/17 EAB 12.8    Unsatisfactory specimen - clottedDIANE NEREIDA,0637 03/27/17      INR  Recent Labs  Lab 03/27/17  0613   INR 0.98     LFTs  Recent Labs  Lab 03/25/17  2130   ALKPHOS 68   AST 17   ALT 16   BILITOTAL 0.2   PROTTOTAL 7.7   ALBUMIN 4.3      PANC  Recent Labs  Lab " 03/25/17  2130   LIPASE 239       HPI:  Lida Blood is a 31 year old female with a history of with hx of CCY, appendectomy, hysterectomy, as well as RNYGB done in [facility] in 2012, complicated by marginal ulcer s/p resection the Arely limb, she also has chronic ulcer at the GJ anastomosis site s/p resection 3/2016, Graves disease, presented with acute onset abdominal pain. She states she has some chronic abdominal pain but nothing like this. For the past several weeks she was having worsening acid reflux and underwent an EGD on 3/1/2017, which did not show any esophagitis, nodule noted proximal to anastomosis, biopsy showed debris with ulcer material. She added Carafate and her acid reflux has improved. Day of presentation she went to lift up her laundry basket, and experienced acute onset, sharp left sided pain; she states this pain is much worse than her chronic pain and a bit different. She had emesis x 1, dark material, no blood, and her has BM was also dark but not tarry and there was no blood. Her pain was escalating prompting her to come to the hospital.   She was a smoker prior to her surgery but not since her RNY, she denies NSAID usage. She denies fevers, chills. She does have some pleuritic chest pain but she feels this is related to her abdominal pain and that it's radiating up.     ROS: A comprehensive Review of Systems was asked and answered in the negative unless specifically commented upon in the HPI    PMHx:  Past Medical History:   Diagnosis Date     Endometriosis of pelvic peritoneum 10/06/08     Endometriosis of uterus 10/06/08    Admit. 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    s/p radioactive iodine ablation Rx     Major depressive disorder, single episode, moderate (H)      Migraine headache 10/14/2011     Obesity (BMI 30-39.9)      Other and unspecified ovarian cyst 03/02/10    d/c 03/04/10      Pain pelvic 12/14/04    Discharged 12/17/04-[facility]     PONV (postoperative nausea and vomiting)      S/P hysterectomy      Thyrotoxicosis without mention of goiter or other cause, without mention of thyrotoxic crisis or storm 2001     trachelectomy 05/19/09       PSurgHx:   Past Surgical History:   Procedure Laterality Date     C APPENDECTOMY  4/18/2003     C REMOVAL GALLBLADDER  8/1/03     C REMOVAL OF CERVIX  05/29/09    laparoscopic assisted vaginal trachelectomy     C SUPRACERV ABD HYSTERECTOMY  10/06/08     COLONOSCOPY  11/25/14     ESOPHAGOSCOPY, GASTROSCOPY, DUODENOSCOPY (EGD), COMBINED N/A 8/31/2015    Procedure: COMBINED ESOPHAGOSCOPY, GASTROSCOPY, DUODENOSCOPY (EGD), BIOPSY SINGLE OR MULTIPLE;  Surgeon: Toni Wiggins MD;  Location:  GI     ESOPHAGOSCOPY, GASTROSCOPY, DUODENOSCOPY (EGD), COMBINED N/A 8/18/2016    Procedure: COMBINED ESOPHAGOSCOPY, GASTROSCOPY, DUODENOSCOPY (EGD);  Surgeon: Jaswinder Waddell MD;  Location: [facility]     ESOPHAGOSCOPY, GASTROSCOPY, DUODENOSCOPY (EGD), COMBINED N/A 3/1/2017    Procedure: COMBINED ESOPHAGOSCOPY, GASTROSCOPY, DUODENOSCOPY (EGD), BIOPSY SINGLE OR MULTIPLE;  Surgeon: Jan Johnston MD;  Location:  GI     ESOPHAGOSCOPY, GASTROSCOPY, DUODENOSCOPY (EGD), DILATATION, COMBINED N/A 4/13/2016    Procedure: COMBINED ESOPHAGOSCOPY, GASTROSCOPY, DUODENOSCOPY (EGD), DILATATION;  Surgeon: Jos Dinero MD;  Location:  GI     EYE SURGERY      bilat orbital decompression surgery      HC COLONOSCOPY W BIOPSY  1/22/2007     HC COLONOSCOPY W/WO BRUSH/WASH  12/16/04     HC CREATE EARDRUM OPENING,GEN ANESTH  12/29/88    Bilateral myringotomy with insertion of PE tubes     HC CREATE EARDRUM OPENING,GEN ANESTH  12/20/90    Bilateral myringotomy with insertion of PE tubes     HC LAP,FULGURATE/EXCISE LESIONS  06/16/08    Laparoscopy, ablation of the endometriosis and diagnostic cystoscopy     HC LAPAROSCOPY, SURGICAL, ABDOMEN, PERITONEUM & OMENTUM;  DX W/ OR W/O SPECIMEN(S)  04/18/2003    Diagnostic laparoscopy     HC TYMPANOPLASTY W/O MASTOID, INIT/REV W/O OSS CHAIN RECONST  10/03/00    Left exploratory tympanoplasty, myringoplasty, fascia grafting of the oval window, microdissection via the use of operative microscope     HC UGI ENDOSCOPY, SIMPLE EXAM  3/23/12, 11/13/14     HERNIA REPAIR, INCISIONAL  08/23/2006    Laparoscopic mesh repair.     HYSTERECTOMY, PAP NO LONGER INDICATED  5/29/09     HYSTEROSCOPY  02/08/08     LAPAROSCOPIC GASTRECTOMY N/A 3/15/2016    Procedure: LAPAROSCOPIC GASTRECTOMY;  Surgeon: Jos Dinero MD;  Location: [facility] OR     LAPAROSCOPIC LYSIS ADHESIONS  11/02/10    [facility]     LAPAROSCOPIC REVISION GASTROPLASTY TO ELBERT-EN-Y N/A 10/26/2015    Procedure: LAPAROSCOPIC REVISION GASTROPLASTY TO ELBERT-EN-Y;  Surgeon: Toni Wiggins MD;  Location: [facility] OR     LAPAROSCOPIC SALPINGO-OOPHORECTOMY Left 3/2015     LAPAROSCOPY DIAGNOSTIC (GENERAL) Right 06/23/11    Evaluation of abdomen/pelvis, RSO, Cysto; [facility]       MEDS:    No current facility-administered medications on file prior to encounter.   Current Outpatient Prescriptions on File Prior to Encounter:  CALCIUM PO Take by mouth daily   sucralfate (CARAFATE) 1 GM/10ML suspension Take 10 mLs (1 g) by mouth 4 times daily   OMEPRAZOLE PO Take 40 mg by mouth 2 times daily (before meals)   levothyroxine (SYNTHROID, LEVOTHROID) 200 MCG tablet Take 1 tablet (200 mcg) by mouth daily   estradiol 0.075 MG/24HR PTWK Place 1 patch onto the skin once a week   levothyroxine (SYNTHROID, LEVOTHROID) 75 MCG tablet Take 1 tablet (75 mcg) by mouth daily Take along with 200 mcg tablet daily.   Cholecalciferol (VITAMIN D PO) Take by mouth daily   escitalopram (LEXAPRO) 20 MG tablet Take 1 tablet (20 mg) by mouth daily       ALLERGIES:    Allergies   Allergen Reactions     No Known Drug Allergies        FHx:  Family History   Problem Relation Age of Onset     Breast Cancer Mother       age 42 on chemo and radiation     Gynecology Mother      endometriosis     DIABETES Maternal Grandmother      type 1     Thyroid Disease Maternal Grandmother      C.A.D. Maternal Grandmother      Cardiovascular Maternal Grandmother      HEART DISEASE Maternal Grandmother      HEART DISEASE Paternal Grandmother      DIABETES Maternal Grandfather      diabetes     Thyroid Disease Maternal Aunt      HEART DISEASE Maternal Aunt      great MGA     Gynecology Sister      endometriosis     Anesthesia Reaction No family hx of        SOCIAL Hx:  Social History     Social History     Marital status:      Spouse name: patric     Number of children: 01     Years of education: 12     Occupational History     specialist Other     [employer]     Social History Main Topics     Smoking status: Former Smoker     Packs/day: 0.25     Years: 6.00     Types: Cigarettes     Quit date: 6/16/2015     Smokeless tobacco: Never Used      Comment: social     Alcohol use Yes      Comment: occas     Drug use: No     Sexual activity: Yes     Partners: Male     Birth control/ protection: Surgical      Comment: hysterectomy 2008     Other Topics Concern      Service No     Blood Transfusions No     Caffeine Concern No     Occupational Exposure No     Hobby Hazards No     Sleep Concern No     Stress Concern Yes     Weight Concern Yes     she exercised all the time     Special Diet No     Back Care No     Exercise Yes     all the time     Bike Helmet No     Seat Belt Yes     Self-Exams No     Social History Narrative

## 2017-03-27 NOTE — PROGRESS NOTES
Bariatric Surgery Progress Note    Lida Blood  [id number]    Subjective:  Pain continues to be poorly controlled.  Continues to endorse nausea, no emesis yesterday.  Had BM early this AM and two on Saturday.  Abdominal pain continues to the left of midline, radiating to the back.      Temp:  [96.3  F (35.7  C)-97.3  F (36.3  C)] 96.9  F (36.1  C)  Pulse:  [61-67] 61  Resp:  [16] 16  BP: (101-107)/(61-87) 105/87  SpO2:  [95 %-97 %] 95 %  I/O last 3 completed shifts:  In: 3010 [P.O.:610; I.V.:2400]  Out: 300 [Urine:300]    Physical Exam:  Gen: AAOx3, NAD  Pulm: Non-labored breathing  Abd: Soft, non-distended, tender in epigastrum and LUQ   No evidence of hernia defects  Ext:  Warm and well-perfused  Neuro:  Moving all extremities    Labs:  BMP  Recent Labs  Lab 03/27/17  0613 03/25/17  2130    144   POTASSIUM 4.6 3.9   CHLORIDE 104 106   CO2 30 32   BUN 10 16   CR 0.92 0.79   GLC 85 82     CBC  Recent Labs  Lab 03/25/17  2130   WBC 4.4   HGB 13.3   HCT 39.1            ASSESSMENT: This is a 31 year old female with history of RNY gastric bypass in 2012, yara limb revision in 2015 for chronic marginal ulcer, laparoscopic resection of gastrojejunal ulcer in 3/2016.  Last EGD was in March.  - Review of imaging shows no evidence of obstruction or hernia  - Do not think an EGD would be helpful at this time.  Last one on 3/1/17 was without significant findings.  - Would recommend continued conservative management, with addition of misoprostol  - From a surgical standpoint, there are no interventions indicated at this time.  From surgery standpoint, the patient may discharge once pain is controlled and she is tolerating diet.  - Bariatric Surgery will sign off.    Edu Louis  Surgery G1    Patient seen and examined Dr. Qureshi, later seen with Dr. Dinero

## 2017-03-27 NOTE — PROGRESS NOTES
"[facility]  \"Hello, my name is Kary Rosales , and I am calling from the [facility].  I want to check in and see how you are doing, after leaving the hospital.  You may also receive a call from your Care Coordinator (care team), but I want to make sure you don t have any urgent needs.  I have a couple questions to review with you:     Post-Discharge Outreach                                                    Lida Blood is a 31 year old female     Follow-up Appointments           Adult [facility] Follow-up and recommended labs and tests       1. With Dr. Whitman in the Gastroenterology Clinic to follow-up this hospital stay - as soon as possible                Care Team:    Patient Care Team       Relationship Specialty Notifications Start End    Joaquin Dudley MD PCP - General Family Practice  3/25/17     Phone: [phone] Fax: [phone]         [facility] [address] [facility] [address]    Radha Hamilton PA-C Physician Assistant Physician Assistant  1/22/16     Phone: [phone] Fax: [phone]         [address] [address]    Kary Ortega RD Registered Dietician Dietitian, Registered  1/22/16     Phone: [phone] Fax: [phone]         [place] [id number]    Jos Dinero MD MD Surgery  3/25/17     Phone: [phone] Fax: [phone]         [address] [address]            Transition of Care Review                                                      Patient was called three times and no answer so post 24 hr DC follow up calls will be closed out                       Plan                                                      Thanks for your time.  Your Care Coordinator may follow-up within the next couple days.  In the meantime if you have questions, concerns or problems call your care team.  "       Kary Rosales

## 2017-05-24 ENCOUNTER — OFFICE VISIT (OUTPATIENT)
Dept: PODIATRY | Facility: OTHER | Age: 32
End: 2017-05-24
Payer: COMMERCIAL

## 2017-05-24 VITALS — WEIGHT: 154 LBS | HEIGHT: 65 IN | TEMPERATURE: 97.8 F | BODY MASS INDEX: 25.66 KG/M2

## 2017-05-24 DIAGNOSIS — M25.371 RIGHT ANKLE INSTABILITY: Primary | ICD-10-CM

## 2017-05-24 DIAGNOSIS — M76.71 PERONEAL TENDONITIS OF RIGHT LOWER EXTREMITY: ICD-10-CM

## 2017-05-24 PROCEDURE — 99203 OFFICE O/P NEW LOW 30 MIN: CPT | Performed by: PODIATRIST

## 2017-05-24 RX ORDER — DICLOFENAC SODIUM 75 MG/1
75 TABLET, DELAYED RELEASE ORAL 2 TIMES DAILY
Qty: 60 TABLET | Refills: 1 | Status: SHIPPED | OUTPATIENT
Start: 2017-05-24 | End: 2017-07-19

## 2017-05-24 ASSESSMENT — PAIN SCALES - GENERAL: PAINLEVEL: NO PAIN (0)

## 2017-05-24 NOTE — MR AVS SNAPSHOT
After Visit Summary   5/24/2017    Lida Blood    MRN: [id number]           Patient Information     Date Of Birth          1985        Visit Information        Provider Department      5/24/2017 8:30 AM Delgado Martinez DPM [facility]        Today's Diagnoses     Right ankle instability    -  1    Peroneal tendonitis of right lower extremity          Care Instructions      Reliable shoe stores: To maximize your experience and provide the best possible fit.  Be sure to show them your foot concerns and tell them Dr. Martinez sent you.      Stores listed in bold have only athletic shoes, and stores that are not bold are mostly casual or variety of shoes    [employer]  [address] [place] [address]  [phone]    [employer] [place]  [address] [place] [address]  [phone]    [employer]  [address] [place] [address]  [phone]    [email or web address]  [address]  [facility] [place] [address]  [phone]    [employer]  [address] [place] [address]  [phone]    [employer]  [address] [place] [address]  [phone]                         [employer] Locations:     [address] [place] [address]   [phone]     [address] [place] [address]   [phone]     [address] [place] [address]   [phone]     [address] [place] [address]   [phone]     [address] [facility] [place] [address]   [phone]     [address] [facility] [place] [address]   [phone]     [address] [facility] [address]   [place] [address]   [phone]     [address]   [place], [place] [address] [phone]             How to find reasonable shoes          The correct width    Correct Fitting    Correct Length      Foot Distortion    Posture Distortion                          Torsional Rigidity      Grasp  "behind the heel and underneath the foot and twist      Bad    Excessive torsion/twist in midfoot     Less torsion/twist in midfoot is better                   Heel Counter Rigidity      Grasp just above   midsole and squeeze      Bad    Soft heel counter      Good    Rigid Heel Counter      Flexion Rigidity      Grasp shoe and bend from forefoot to rearfoot              Ankle instability and peroneal tendonitis      Chronic ankle instability    Diminished ankle proprioception.  Strengthen the muscles that cross the ankle to prevent instability and loss of joint function.    [email or web address]   Search youtube for \"indoboard girl\" to understand how they work    Bongo board or BAPS from [employer]              Follow-ups after your visit        Additional Services     ORTHOTICS REFERRAL       [facility] scheduling staff may contact patient to arrange appointments for casting of orthotics and often do not leave messages.  The patient may call this number for scheduling at their convenience. Scheduling Phone [phone].      One pair custom functional foot orthotics.   Flexible polypropylene shell with 1/8\" Spenco cushioned top cover, crepe rearfoot post, medial density arch fill, JUDAH bottom cover.  Aerobic model.            PHYSICAL THERAPY REFERRAL       *This therapy referral will be filtered to a centralized scheduling office at [facility] and the patient will receive a call to schedule an appointment at a [facility] [place] location most convenient for them. *     [facility] provides Physical Therapy evaluation and treatment and many specialty services across the [place] system.  If requesting a specialty program, please choose from the list below.    If you have not heard from the scheduling office within 2 business days, please call [phone] for all locations, with the exception of [place], please call [phone].  Treatment: Evaluation & Treatment  Special " "Instructions/Modalities: eval and treat for ankle instability and some peroneal tendonopathy    Special Programs: [employer] for ankle instabiity and right ankle peroneal tendontis    Please be aware that coverage of these services is subject to the terms and limitations of your health insurance plan.  Call member services at your health plan with any benefit or coverage questions.      **Note to Provider:  If you are referring outside of [facility] for the therapy appointment, please list the name of the location in the \"special instructions\" above, print the referral and give to the patient to schedule the appointment.                  Who to contact     If you have questions or need follow up information about today's clinic visit or your schedule please contact [facility] directly at [phone].  Normal or non-critical lab and imaging results will be communicated to you by [employer], letter or phone within 4 business days after the clinic has received the results. If you do not hear from us within 7 days, please contact the clinic through [employer] or phone. If you have a critical or abnormal lab result, we will notify you by phone as soon as possible.  Submit refill requests through [employer] or call your pharmacy and they will forward the refill request to us. Please allow 3 business days for your refill to be completed.          Additional Information About Your Visit        [employer] Information     [employer] gives you secure access to your electronic health record. If you see a primary care provider, you can also send messages to your care team and make appointments. If you have questions, please call your primary care clinic.  If you do not have a primary care provider, please call [phone] and they will assist you.        Care EveryWhere ID     This is your Care EveryWhere ID. This could be used by other organizations to access your [facility] medical records  [id number]        Your Vitals Were     " "Temperature Height Last Period BMI (Body Mass Index)          97.8  F (36.6  C) (Temporal) 5' 5\" (1.651 m) 09/01/2008 25.63 kg/m2         Blood Pressure from Last 3 Encounters:   03/27/17 109/70   03/26/17 121/87   03/01/17 107/70    Weight from Last 3 Encounters:   05/24/17 154 lb (69.9 kg)   03/26/17 159 lb 11.2 oz (72.4 kg)   03/25/17 155 lb (70.3 kg)              We Performed the Following     ORTHOTICS REFERRAL     PHYSICAL THERAPY REFERRAL          Today's Medication Changes          These changes are accurate as of: 5/24/17  8:56 AM.  If you have any questions, ask your nurse or doctor.               Start taking these medicines.        Dose/Directions    diclofenac 75 MG EC tablet   Commonly known as:  VOLTAREN   Used for:  Right ankle instability, Peroneal tendonitis of right lower extremity   Started by:  Delgado Martinez DPM        Dose:  75 mg   Take 1 tablet (75 mg) by mouth 2 times daily   Quantity:  60 tablet   Refills:  1            Where to get your medicines      These medications were sent to [facility] [place] [address]   [address] [facility] [address]     Phone:  [phone]     diclofenac 75 MG EC tablet                Primary Care Provider Office Phone # Fax #    Joaquin Dudley [phone]       [facility] [address] [facility] [address]        Thank you!     Thank you for choosing [facility]  for your care. Our goal is always to provide you with excellent care. Hearing back from our patients is one way we can continue to improve our services. Please take a few minutes to complete the written survey that you may receive in the mail after your visit with us. Thank you!             Your Updated Medication List - Protect others around you: Learn how to safely use, store and throw away your medicines at www.disposemymeds.org.          This list is accurate as of: 5/24/17  8:56 AM.  Always use your most recent med list.       "             Brand Name Dispense Instructions for use    CALCIUM PO      Take by mouth daily       diclofenac 75 MG EC tablet    VOLTAREN    60 tablet    Take 1 tablet (75 mg) by mouth 2 times daily       escitalopram 20 MG tablet    LEXAPRO    90 tablet    Take 1 tablet (20 mg) by mouth daily       estradiol 0.075 MG/24HR Ptwk     12 patch    Place 1 patch onto the skin once a week       * levothyroxine 75 MCG tablet    SYNTHROID/LEVOTHROID    90 tablet    Take 1 tablet (75 mcg) by mouth daily Take along with 200 mcg tablet daily.       * levothyroxine 200 MCG tablet    SYNTHROID/LEVOTHROID    30 tablet    Take 1 tablet (200 mcg) by mouth daily       misoprostol 200 MCG tablet    CYTOTEC    120 tablet    Take 1 tablet (200 mcg) by mouth every 6 hours       OMEPRAZOLE PO      Take 40 mg by mouth 2 times daily (before meals)       VITAMIN D PO      Take by mouth daily       * Notice:  This list has 2 medication(s) that are the same as other medications prescribed for you. Read the directions carefully, and ask your doctor or other care provider to review them with you.

## 2017-05-24 NOTE — LETTER
5/24/2017       RE: Lida Blood  [id number] [facility] [address]           Dear Colleague,    Thank you for referring your patient, Lida Blood, to the [facility]. Please see a copy of my visit note below.    HPI:  Lida Blood is a 31 year old female who is seen in consultation at the request of self.    Pt presents for eval of:   (Onset, Location, L/R, Character, Treatments, Injury if yes)     Onset Fall 2016, started out as intermittent lateral Right ankle pain now is more constant  Dull ache, radiates up side of lower leg, burning, pain feels like it moves, swelling, pain 4. History of previous right ankle sprain in high school as well.  Supportive shoes, ice    Works at [employer] ([place]    Weight management plan: Patient was referred to their PCP to discuss a diet and exercise plan.     ROS:  10 point ROS neg other than the symptoms noted above in the HPI.    PAST MEDICAL HISTORY:   Past Medical History:   Diagnosis Date     Endometriosis of pelvic peritoneum 10/06/08     Endometriosis of uterus 10/06/08    Admit. 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    s/p radioactive iodine ablation Rx     Major depressive disorder, single episode, moderate (H)      Migraine headache 10/14/2011     Obesity (BMI 30-39.9)      Other and unspecified ovarian cyst 03/02/10    d/c 03/04/10     Pain pelvic 12/14/04    Discharged 12/17/04-[facility]     PONV (postoperative nausea and vomiting)      S/P hysterectomy      Thyrotoxicosis without mention of goiter or other cause, without mention of thyrotoxic crisis or storm 2001     trachelectomy 05/19/09        PAST SURGICAL HISTORY:   Past Surgical History:   Procedure Laterality Date     C APPENDECTOMY  4/18/2003     C REMOVAL GALLBLADDER  8/1/03     C REMOVAL OF CERVIX  05/29/09    laparoscopic assisted vaginal  trachelectomy     C SUPRACERV ABD HYSTERECTOMY  10/06/08     COLONOSCOPY  11/25/14     ESOPHAGOSCOPY, GASTROSCOPY, DUODENOSCOPY (EGD), COMBINED N/A 8/31/2015    Procedure: COMBINED ESOPHAGOSCOPY, GASTROSCOPY, DUODENOSCOPY (EGD), BIOPSY SINGLE OR MULTIPLE;  Surgeon: Toni Wgigins MD;  Location:  GI     ESOPHAGOSCOPY, GASTROSCOPY, DUODENOSCOPY (EGD), COMBINED N/A 8/18/2016    Procedure: COMBINED ESOPHAGOSCOPY, GASTROSCOPY, DUODENOSCOPY (EGD);  Surgeon: Jaswinder Waddell MD;  Location:  GI     ESOPHAGOSCOPY, GASTROSCOPY, DUODENOSCOPY (EGD), COMBINED N/A 3/1/2017    Procedure: COMBINED ESOPHAGOSCOPY, GASTROSCOPY, DUODENOSCOPY (EGD), BIOPSY SINGLE OR MULTIPLE;  Surgeon: Jan Johnston MD;  Location:  GI     ESOPHAGOSCOPY, GASTROSCOPY, DUODENOSCOPY (EGD), DILATATION, COMBINED N/A 4/13/2016    Procedure: COMBINED ESOPHAGOSCOPY, GASTROSCOPY, DUODENOSCOPY (EGD), DILATATION;  Surgeon: Jos Dinero MD;  Location:  GI     EYE SURGERY      bilat orbital decompression surgery      HC COLONOSCOPY W BIOPSY  1/22/2007     HC COLONOSCOPY W/WO BRUSH/WASH  12/16/04     HC CREATE EARDRUM OPENING,GEN ANESTH  12/29/88    Bilateral myringotomy with insertion of PE tubes     HC CREATE EARDRUM OPENING,GEN ANESTH  12/20/90    Bilateral myringotomy with insertion of PE tubes     HC LAP,FULGURATE/EXCISE LESIONS  06/16/08    Laparoscopy, ablation of the endometriosis and diagnostic cystoscopy     HC LAPAROSCOPY, SURGICAL, ABDOMEN, PERITONEUM & OMENTUM; DX W/ OR W/O SPECIMEN(S)  04/18/2003    Diagnostic laparoscopy     HC TYMPANOPLASTY W/O MASTOID, INIT/REV W/O OSS CHAIN RECONST  10/03/00    Left exploratory tympanoplasty, myringoplasty, fascia grafting of the oval window, microdissection via the use of operative microscope      UGI ENDOSCOPY, SIMPLE EXAM  3/23/12, 11/13/14     HERNIA REPAIR, INCISIONAL  08/23/2006    Laparoscopic mesh repair.     HYSTERECTOMY, PAP NO LONGER INDICATED  5/29/09     HYSTEROSCOPY   02/08/08     LAPAROSCOPIC GASTRECTOMY N/A 3/15/2016    Procedure: LAPAROSCOPIC GASTRECTOMY;  Surgeon: Jos Dinero MD;  Location: [facility] OR     LAPAROSCOPIC LYSIS ADHESIONS  11/02/10    [facility]     LAPAROSCOPIC REVISION GASTROPLASTY TO ELBERT-EN-Y N/A 10/26/2015    Procedure: LAPAROSCOPIC REVISION GASTROPLASTY TO ELBERT-EN-Y;  Surgeon: Toni Wiggins MD;  Location: [facility] OR     LAPAROSCOPIC SALPINGO-OOPHORECTOMY Left 3/2015     LAPAROSCOPY DIAGNOSTIC (GENERAL) Right 06/23/11    Evaluation of abdomen/pelvis, RSO, Cysto; [facility]        MEDICATIONS:   Current Outpatient Prescriptions:      diclofenac (VOLTAREN) 75 MG EC tablet, Take 1 tablet (75 mg) by mouth 2 times daily, Disp: 60 tablet, Rfl: 1     misoprostol (CYTOTEC) 200 MCG tablet, Take 1 tablet (200 mcg) by mouth every 6 hours, Disp: 120 tablet, Rfl: 0     OMEPRAZOLE PO, Take 40 mg by mouth 2 times daily (before meals), Disp: , Rfl:      levothyroxine (SYNTHROID, LEVOTHROID) 200 MCG tablet, Take 1 tablet (200 mcg) by mouth daily, Disp: 30 tablet, Rfl: 1     estradiol 0.075 MG/24HR PTWK, Place 1 patch onto the skin once a week, Disp: 12 patch, Rfl: 3     levothyroxine (SYNTHROID, LEVOTHROID) 75 MCG tablet, Take 1 tablet (75 mcg) by mouth daily Take along with 200 mcg tablet daily., Disp: 90 tablet, Rfl: 1     CALCIUM PO, Take by mouth daily, Disp: , Rfl:      Cholecalciferol (VITAMIN D PO), Take by mouth daily, Disp: , Rfl:      escitalopram (LEXAPRO) 20 MG tablet, Take 1 tablet (20 mg) by mouth daily, Disp: 90 tablet, Rfl: 1     ALLERGIES:    Allergies   Allergen Reactions     No Known Drug Allergies         SOCIAL HISTORY:   Social History     Social History     Marital status:      Spouse name: patric     Number of children: 01     Years of education: 12     Occupational History     specialist Other     [employer]     Social History Main Topics     Smoking status: Former Smoker     Packs/day: 0.25     Years: 6.00     Types:  "Cigarettes     Quit date: 6/16/2015     Smokeless tobacco: Never Used      Comment: social     Alcohol use Yes      Comment: occas     Drug use: No     Sexual activity: Yes     Partners: Male     Birth control/ protection: Surgical      Comment: hysterectomy 2008     Other Topics Concern      Service No     Blood Transfusions No     Caffeine Concern No     Occupational Exposure No     Hobby Hazards No     Sleep Concern No     Stress Concern Yes     Weight Concern Yes     she exercised all the time     Special Diet No     Back Care No     Exercise Yes     all the time     Bike Helmet No     Seat Belt Yes     Self-Exams No     Social History Narrative        FAMILY HISTORY:   Family History   Problem Relation Age of Onset     Breast Cancer Mother      age 42 on chemo and radiation     Gynecology Mother      endometriosis     DIABETES Maternal Grandmother      type 1     Thyroid Disease Maternal Grandmother      C.A.D. Maternal Grandmother      Cardiovascular Maternal Grandmother      HEART DISEASE Maternal Grandmother      HEART DISEASE Paternal Grandmother      DIABETES Maternal Grandfather      diabetes     Thyroid Disease Maternal Aunt      HEART DISEASE Maternal Aunt      great MGA     Gynecology Sister      endometriosis     Anesthesia Reaction No family hx of         EXAM:Vitals: Temp 97.8  F (36.6  C) (Temporal)  Ht 5' 5\" (1.651 m)  Wt 154 lb (69.9 kg)  LMP 09/01/2008  BMI 25.63 kg/m2  BMI= Body mass index is 25.63 kg/(m^2).    General appearance: Patient is alert and fully cooperative with history & exam.  No sign of distress is noted during the visit.     Psychiatric: Affect is pleasant & appropriate.  Patient appears motivated to improve health.     Respiratory: Breathing is regular & unlabored while sitting.     HEENT: Hearing is intact to spoken word.  Speech is clear.  No gross evidence of visual impairment that would impact ambulation.     Vascular: DP & PT pulses are intact & regular " bilaterally.  No significant edema or varicosities noted.  CFT and skin temperature is normal to both lower extremities.     Neurologic: Lower extremity sensation is intact to light touch.  No evidence of weakness or contracture in the lower extremities.  No evidence of neuropathy.    Dermatologic: Skin is intact to both lower extremities with adequate texture, turgor and tone about the integument.  No paronychia or evidence of soft tissue infection is noted.     Musculoskeletal: Patient is ambulatory without assistive device or brace.  No gross ankle deformity noted.  No foot or ankle joint effusion is noted.  Very positive anterior drawer noted about the right ankle but not in the left. There is crepitus within the peroneal tendons on the right foot with very minimal induration. Peroneal tendons are strong. She is able to perform a unilateral toe raise right and left foot however much diminished proprioception is noted with balance and unilateral toe raise on the right when compared to the left.  All pain is noted about the peroneal tendons distal fibula anterior and posterior as well as a little bit about the lateral ankle gutter but not about the anterior margin of the ankle.    Imaging: Deferred     ASSESSMENT:       ICD-10-CM    1. Right ankle instability M25.371 ORTHOTICS REFERRAL     PHYSICAL THERAPY REFERRAL     diclofenac (VOLTAREN) 75 MG EC tablet   2. Peroneal tendonitis of right lower extremity M76.71 ORTHOTICS REFERRAL     PHYSICAL THERAPY REFERRAL     diclofenac (VOLTAREN) 75 MG EC tablet        PLAN:  Reviewed patient's chart in [facility].      5/24/2017   This patient has ankle instability and has developed peroneal tendinitis tendinopathy to accommodate for a very positive anterior drawer unstable right ankle.   Have ordered physical therapy to improve proprioception  Recommend sturdy stable shoes written instructions dispensed  Order for custom molded orthotics to provide some stability  May also  consider an ankle brace for a month or 2 or until symptoms are improving then discontinue it  Also begin oral anti-inflammatory for month or 2 or until symptoms improve  After she is established this care if her symptoms continue we will image with x-ray of the foot and ankle and MRI of the right ankle.    Delgado Martinez DPM      Again, thank you for allowing me to participate in the care of your patient.        Sincerely,              Delgado Martinez DPM

## 2017-05-24 NOTE — NURSING NOTE
"Chief Complaint   Patient presents with     Consult     lateral Right ankle pain, onset Fall 2016; new pt       Initial Temp 97.8  F (36.6  C) (Temporal)  Ht 5' 5\" (1.651 m)  Wt 154 lb (69.9 kg)  LMP 09/01/2008  BMI 25.63 kg/m2 Estimated body mass index is 25.63 kg/(m^2) as calculated from the following:    Height as of this encounter: 5' 5\" (1.651 m).    Weight as of this encounter: 154 lb (69.9 kg).  BP completed using cuff size: NA (Not Taken)  Medication Reconciliation: complete    Adeline Schmid CMA, May 24, 2017  "

## 2017-05-24 NOTE — PROGRESS NOTES
HPI:  Lida Blood is a 31 year old female who is seen in consultation at the request of self.    Pt presents for eval of:   (Onset, Location, L/R, Character, Treatments, Injury if yes)     Onset Fall 2016, started out as intermittent lateral Right ankle pain now is more constant  Dull ache, radiates up side of lower leg, burning, pain feels like it moves, swelling, pain 4. History of previous right ankle sprain in high school as well.  Supportive shoes, ice    Works at [employer].    Weight management plan: Patient was referred to their PCP to discuss a diet and exercise plan.     ROS:  10 point ROS neg other than the symptoms noted above in the HPI.    PAST MEDICAL HISTORY:   Past Medical History:   Diagnosis Date     Endometriosis of pelvic peritoneum 10/06/08     Endometriosis of uterus 10/06/08    Admit. 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    s/p radioactive iodine ablation Rx     Major depressive disorder, single episode, moderate (H)      Migraine headache 10/14/2011     Obesity (BMI 30-39.9)      Other and unspecified ovarian cyst 03/02/10    d/c 03/04/10     Pain pelvic 12/14/04    Discharged 12/17/04-[facility]     PONV (postoperative nausea and vomiting)      S/P hysterectomy      Thyrotoxicosis without mention of goiter or other cause, without mention of thyrotoxic crisis or storm 2001     trachelectomy 05/19/09        PAST SURGICAL HISTORY:   Past Surgical History:   Procedure Laterality Date     C APPENDECTOMY  4/18/2003     C REMOVAL GALLBLADDER  8/1/03     C REMOVAL OF CERVIX  05/29/09    laparoscopic assisted vaginal trachelectomy     C SUPRACERV ABD HYSTERECTOMY  10/06/08     COLONOSCOPY  11/25/14     ESOPHAGOSCOPY, GASTROSCOPY, DUODENOSCOPY (EGD), COMBINED N/A 8/31/2015    Procedure: COMBINED ESOPHAGOSCOPY, GASTROSCOPY, DUODENOSCOPY (EGD), BIOPSY SINGLE OR MULTIPLE;   Surgeon: Toni Wiggins MD;  Location:  GI     ESOPHAGOSCOPY, GASTROSCOPY, DUODENOSCOPY (EGD), COMBINED N/A 8/18/2016    Procedure: COMBINED ESOPHAGOSCOPY, GASTROSCOPY, DUODENOSCOPY (EGD);  Surgeon: Jaswinder Waddell MD;  Location:  GI     ESOPHAGOSCOPY, GASTROSCOPY, DUODENOSCOPY (EGD), COMBINED N/A 3/1/2017    Procedure: COMBINED ESOPHAGOSCOPY, GASTROSCOPY, DUODENOSCOPY (EGD), BIOPSY SINGLE OR MULTIPLE;  Surgeon: Jan Johnston MD;  Location:  GI     ESOPHAGOSCOPY, GASTROSCOPY, DUODENOSCOPY (EGD), DILATATION, COMBINED N/A 4/13/2016    Procedure: COMBINED ESOPHAGOSCOPY, GASTROSCOPY, DUODENOSCOPY (EGD), DILATATION;  Surgeon: Jos Dinero MD;  Location:  GI     EYE SURGERY      bilat orbital decompression surgery      HC COLONOSCOPY W BIOPSY  1/22/2007     HC COLONOSCOPY W/WO BRUSH/WASH  12/16/04     HC CREATE EARDRUM OPENING,GEN ANESTH  12/29/88    Bilateral myringotomy with insertion of PE tubes     HC CREATE EARDRUM OPENING,GEN ANESTH  12/20/90    Bilateral myringotomy with insertion of PE tubes     HC LAP,FULGURATE/EXCISE LESIONS  06/16/08    Laparoscopy, ablation of the endometriosis and diagnostic cystoscopy     HC LAPAROSCOPY, SURGICAL, ABDOMEN, PERITONEUM & OMENTUM; DX W/ OR W/O SPECIMEN(S)  04/18/2003    Diagnostic laparoscopy     HC TYMPANOPLASTY W/O MASTOID, INIT/REV W/O OSS CHAIN RECONST  10/03/00    Left exploratory tympanoplasty, myringoplasty, fascia grafting of the oval window, microdissection via the use of operative microscope     HC UGI ENDOSCOPY, SIMPLE EXAM  3/23/12, 11/13/14     HERNIA REPAIR, INCISIONAL  08/23/2006    Laparoscopic mesh repair.     HYSTERECTOMY, PAP NO LONGER INDICATED  5/29/09     HYSTEROSCOPY  02/08/08     LAPAROSCOPIC GASTRECTOMY N/A 3/15/2016    Procedure: LAPAROSCOPIC GASTRECTOMY;  Surgeon: Jos Dinero MD;  Location:  OR     LAPAROSCOPIC LYSIS ADHESIONS  11/02/10    [facility]     LAPAROSCOPIC REVISION GASTROPLASTY TO  ELBERT-EN-Y N/A 10/26/2015    Procedure: LAPAROSCOPIC REVISION GASTROPLASTY TO ELBERT-EN-Y;  Surgeon: Toni Wiggins MD;  Location: [facility] OR     LAPAROSCOPIC SALPINGO-OOPHORECTOMY Left 3/2015     LAPAROSCOPY DIAGNOSTIC (GENERAL) Right 06/23/11    Evaluation of abdomen/pelvis, RSO, Cysto; [facility]        MEDICATIONS:   Current Outpatient Prescriptions:      diclofenac (VOLTAREN) 75 MG EC tablet, Take 1 tablet (75 mg) by mouth 2 times daily, Disp: 60 tablet, Rfl: 1     misoprostol (CYTOTEC) 200 MCG tablet, Take 1 tablet (200 mcg) by mouth every 6 hours, Disp: 120 tablet, Rfl: 0     OMEPRAZOLE PO, Take 40 mg by mouth 2 times daily (before meals), Disp: , Rfl:      levothyroxine (SYNTHROID, LEVOTHROID) 200 MCG tablet, Take 1 tablet (200 mcg) by mouth daily, Disp: 30 tablet, Rfl: 1     estradiol 0.075 MG/24HR PTWK, Place 1 patch onto the skin once a week, Disp: 12 patch, Rfl: 3     levothyroxine (SYNTHROID, LEVOTHROID) 75 MCG tablet, Take 1 tablet (75 mcg) by mouth daily Take along with 200 mcg tablet daily., Disp: 90 tablet, Rfl: 1     CALCIUM PO, Take by mouth daily, Disp: , Rfl:      Cholecalciferol (VITAMIN D PO), Take by mouth daily, Disp: , Rfl:      escitalopram (LEXAPRO) 20 MG tablet, Take 1 tablet (20 mg) by mouth daily, Disp: 90 tablet, Rfl: 1     ALLERGIES:    Allergies   Allergen Reactions     No Known Drug Allergies         SOCIAL HISTORY:   Social History     Social History     Marital status:      Spouse name: patric     Number of children: 01     Years of education: 12     Occupational History     specialist Other     [employer]     Social History Main Topics     Smoking status: Former Smoker     Packs/day: 0.25     Years: 6.00     Types: Cigarettes     Quit date: 6/16/2015     Smokeless tobacco: Never Used      Comment: social     Alcohol use Yes      Comment: occas     Drug use: No     Sexual activity: Yes     Partners: Male     Birth control/ protection: Surgical      Comment: hysterectomy  "2008     Other Topics Concern      Service No     Blood Transfusions No     Caffeine Concern No     Occupational Exposure No     Hobby Hazards No     Sleep Concern No     Stress Concern Yes     Weight Concern Yes     she exercised all the time     Special Diet No     Back Care No     Exercise Yes     all the time     Bike Helmet No     Seat Belt Yes     Self-Exams No     Social History Narrative        FAMILY HISTORY:   Family History   Problem Relation Age of Onset     Breast Cancer Mother      age 42 on chemo and radiation     Gynecology Mother      endometriosis     DIABETES Maternal Grandmother      type 1     Thyroid Disease Maternal Grandmother      C.A.D. Maternal Grandmother      Cardiovascular Maternal Grandmother      HEART DISEASE Maternal Grandmother      HEART DISEASE Paternal Grandmother      DIABETES Maternal Grandfather      diabetes     Thyroid Disease Maternal Aunt      HEART DISEASE Maternal Aunt      great MGA     Gynecology Sister      endometriosis     Anesthesia Reaction No family hx of         EXAM:Vitals: Temp 97.8  F (36.6  C) (Temporal)  Ht 5' 5\" (1.651 m)  Wt 154 lb (69.9 kg)  LMP 09/01/2008  BMI 25.63 kg/m2  BMI= Body mass index is 25.63 kg/(m^2).    General appearance: Patient is alert and fully cooperative with history & exam.  No sign of distress is noted during the visit.     Psychiatric: Affect is pleasant & appropriate.  Patient appears motivated to improve health.     Respiratory: Breathing is regular & unlabored while sitting.     HEENT: Hearing is intact to spoken word.  Speech is clear.  No gross evidence of visual impairment that would impact ambulation.     Vascular: DP & PT pulses are intact & regular bilaterally.  No significant edema or varicosities noted.  CFT and skin temperature is normal to both lower extremities.     Neurologic: Lower extremity sensation is intact to light touch.  No evidence of weakness or contracture in the lower extremities.  No " evidence of neuropathy.    Dermatologic: Skin is intact to both lower extremities with adequate texture, turgor and tone about the integument.  No paronychia or evidence of soft tissue infection is noted.     Musculoskeletal: Patient is ambulatory without assistive device or brace.  No gross ankle deformity noted.  No foot or ankle joint effusion is noted.  Very positive anterior drawer noted about the right ankle but not in the left. There is crepitus within the peroneal tendons on the right foot with very minimal induration. Peroneal tendons are strong. She is able to perform a unilateral toe raise right and left foot however much diminished proprioception is noted with balance and unilateral toe raise on the right when compared to the left.  All pain is noted about the peroneal tendons distal fibula anterior and posterior as well as a little bit about the lateral ankle gutter but not about the anterior margin of the ankle.    Imaging: Deferred     ASSESSMENT:       ICD-10-CM    1. Right ankle instability M25.371 ORTHOTICS REFERRAL     PHYSICAL THERAPY REFERRAL     diclofenac (VOLTAREN) 75 MG EC tablet   2. Peroneal tendonitis of right lower extremity M76.71 ORTHOTICS REFERRAL     PHYSICAL THERAPY REFERRAL     diclofenac (VOLTAREN) 75 MG EC tablet        PLAN:  Reviewed patient's chart in [facility].      5/24/2017   This patient has ankle instability and has developed peroneal tendinitis tendinopathy to accommodate for a very positive anterior drawer unstable right ankle.   Have ordered physical therapy to improve proprioception  Recommend sturdy stable shoes written instructions dispensed  Order for custom molded orthotics to provide some stability  May also consider an ankle brace for a month or 2 or until symptoms are improving then discontinue it  Also begin oral anti-inflammatory for month or 2 or until symptoms improve  After she is established this care if her symptoms continue we will image with x-ray of the  foot and ankle and MRI of the right ankle.    Voltaren medication prescribed today is an NSAID.  If you had a recent ulcer you probably have been instructed to discontinue oral NSAIDs. Do not start this medication if you have been instructed to discontinue use of oral NSAIDs or few have an active or recently active gastric ulcer.    Dr. Michelle Martinez, DPM

## 2017-05-24 NOTE — PATIENT INSTRUCTIONS
Reliable shoe stores: To maximize your experience and provide the best possible fit.  Be sure to show them your foot concerns and tell them Dr. Martinez sent you.      Stores listed in bold have only athletic shoes, and stores that are not bold are mostly casual or variety of shoes    [employer]  [address] [place] [address]  [phone]    [employer] [place]  [address] [place] [address]  [phone]     [employer] [place]  [address] [place] [address]  [phone]    [employer]  [address] [facility] [address]  [phone]    [employer]  [address] [place], [place] [address] [phone]    [employer]  [address] [place] [address]  [phone]                         [employer] Locations:     [address] [place] [address]   [phone]     [address] [place] [address] [address] [place] [address]   [phone]     [address] [place] [address]   [phone]     [address] [facility] [place] [address]   [phone]     [address] [place] [address]   [phone]     [address] [place] [address]   [phone]     [address] [facility] [address]   [place] [address]   [phone]     [address] [facility]. [address]   [place] [address]   [phone]             How to find reasonable shoes          The correct width    Correct Fitting    Correct Length      Foot Distortion    Posture Distortion                          Torsional Rigidity      Grasp behind the heel and underneath the foot and twist      Bad    Excessive torsion/twist in midfoot     Less torsion/twist in midfoot is better                   Heel Counter Rigidity      Grasp just above   midsole and squeeze      Bad    Soft heel counter      Good    Rigid Heel Counter      Flexion Rigidity      Grasp shoe and bend from forefoot to rearfoot              Ankle instability and peroneal tendonitis       "Chronic ankle instability    Diminished ankle proprioception.  Strengthen the muscles that cross the ankle to prevent instability and loss of joint function.    www.indoboard.com   Search youtube for \"indoboard girl\" to understand how they work    [employer] board or [employer] from [employer]        Tri Lock ankle brace is a reliable and sturdy ankle brace. A Stromgren double ankle strap, figure of 8 ankle brace or lace up ankle gauntlet or similar brand are most readily available on line, [employer], or [employer] delivered for around $20.  Health insurance will not usually pay for these.     "

## 2017-06-16 ENCOUNTER — DOCUMENTATION ONLY (OUTPATIENT)
Dept: ORTHOPEDICS | Facility: CLINIC | Age: 32
End: 2017-06-16

## 2017-06-16 NOTE — PROGRESS NOTES
S: I saw Lida Blood at the [facility] location for evaluation and impressions for custom molded foot orthoses per Rx of Delgado Martinez DPM. She reported pain posterior to the right lateral malleolus. She has not tried changes footwear yet as she said that Dr. Martinez told her to get the inserts first. I recommended going to a good shoe store to see what they have as I am certain that the orthotic inserts that I make for her will work with the shoes  That she obtains if they meet the guidelines set forth in Dr. Martinez's recommendation and I want her to start to see the benefit from these sooner rather than later.  O/g: Custom molded foot orthoses are to provide improved midfoot positioning and support.   A: I have ordered one pair of custom molded polypropylene and neoprene arch supports from impressions made today in a corrected, neutral position. I have recommended firmer tennis shoes.  P: Scheduled for fitting.

## 2017-07-10 ENCOUNTER — HOSPITAL ENCOUNTER (OUTPATIENT)
Dept: PHYSICAL THERAPY | Facility: OTHER | Age: 32
Setting detail: THERAPIES SERIES
End: 2017-07-10
Attending: PODIATRIST
Payer: COMMERCIAL

## 2017-07-10 PROCEDURE — 97110 THERAPEUTIC EXERCISES: CPT | Mod: GP | Performed by: PHYSICAL THERAPIST

## 2017-07-10 PROCEDURE — 97161 PT EVAL LOW COMPLEX 20 MIN: CPT | Mod: GP | Performed by: PHYSICAL THERAPIST

## 2017-07-10 PROCEDURE — 40000718 ZZHC STATISTIC PT DEPARTMENT ORTHO VISIT: Performed by: PHYSICAL THERAPIST

## 2017-07-10 NOTE — PROGRESS NOTES
07/10/17 0830   General Information   Type of Visit Initial OP Ortho PT Evaluation   Start of Care Date 07/10/17   Referring Physician canelo alicea DPM   Patient/Family Goals Statement ankle pain    Orders Evaluate and Treat   Date of Order 06/24/17   Insurance Type [employer]   Medical Diagnosis right ankle instability m25.371 peroneal tendonitis of right LE M76.71   Surgical/Medical history reviewed Yes   Precautions/Limitations no known precautions/limitations   Body Part(s)   Body Part(s) Ankle/Foot   Presentation and Etiology   Pertinent history of current problem (include personal factors and/or comorbidities that impact the POC) patient reports that in high school rolled her right ankle and has done well until about 6 months ago , no new injury but by end of day her ankle is very sore . 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 like to walk , hike work out video .    Impairments A. Pain   Functional Limitations perform activities of daily living;perform required work activities;perform desired leisure / sports activities   Symptom Location right laterial ankle    Onset date of current episode/exacerbation 01/10/17   Chronicity Chronic   Pain rating (0-10 point scale) Best (/10);Worst (/10)   Best (/10) 2/10   Worst (/10) 5/10   Pain quality A. Sharp;B. Dull;C. Aching;D. Burning   Frequency of pain/symptoms A. Constant   Pain/symptoms are: Worse during the night   Pain/symptoms exacerbated by (walking on uneven ground)   Pain/symptoms eased by C. Rest;H. Cold  (compression )   Progression of symptoms since onset: Worsened   Prior Level of Function   Functional Level Prior Comment walk hike    Current Level of Function   Current Community Support Family/friend caregiver   Patient role/employment history A. Employed   Fall Risk Screen   Fall screen completed by PT   Per patient - Fall 2 or more times in past year? No   Per patient - Fall with injury in past year? No   Is patient a  fall risk? No   Ankle/Foot Objective Findings   Side (if bilateral, select both right and left) Right   Integumentary no significant swelling noted   Gait/Locomotion normal   Balance/ Proprioception (Single Leg Stance) 1 minute right UE support and much more unsteady   Ankle/Foot ROM Comment WFL on right    Ankle/Foot Strength Comments planterflexion 4+/5 inversion , eversion , dorsiflexion 4-/5   Anterior Drawer Test positive   Palpation laterial ankle tender    Planned Therapy Interventions   Planned Therapy Interventions balance training;strengthening   Clinical Impression   Criteria for Skilled Therapeutic Interventions Met yes, treatment indicated   PT Diagnosis right ankle instability and pain    Functional limitations due to impairments LEFS 57/80   Clinical Presentation Stable/Uncomplicated   Clinical Decision Making (Complexity) Low complexity   Predicted Duration of Therapy Intervention (days/wks) 1x instruction in HEP    Risk & Benefits of therapy have been explained Yes   Patient, Family & other staff in agreement with plan of care Yes   Education Assessment   Preferred Learning Style Listening;Reading;Demonstration   Barriers to Learning No barriers   ORTHO GOALS   PT Ortho Eval Goals 1   Ortho Goal 1   Goal Identifier 1   Goal Description instruction in HEP for strengthening , balance and proprioception    Target Date 07/10/17   Date Met 07/10/17   Total Evaluation Time   Total Evaluation Time 15

## 2017-07-17 ENCOUNTER — HOSPITAL ENCOUNTER (EMERGENCY)
Facility: CLINIC | Age: 32
Discharge: HOME | End: 2017-07-17
Attending: FAMILY MEDICINE | Admitting: FAMILY MEDICINE
Payer: COMMERCIAL

## 2017-07-17 ENCOUNTER — APPOINTMENT (OUTPATIENT)
Dept: GENERAL RADIOLOGY | Facility: CLINIC | Age: 32
End: 2017-07-17
Attending: FAMILY MEDICINE
Payer: COMMERCIAL

## 2017-07-17 VITALS
OXYGEN SATURATION: 98 % | SYSTOLIC BLOOD PRESSURE: 123 MMHG | DIASTOLIC BLOOD PRESSURE: 81 MMHG | BODY MASS INDEX: 26.13 KG/M2 | TEMPERATURE: 98.3 F | RESPIRATION RATE: 20 BRPM | HEART RATE: 72 BPM | WEIGHT: 157 LBS

## 2017-07-17 DIAGNOSIS — K25.9 ESOPHAGOGASTRIC ULCER, UNSPECIFIED ULCER CHRONICITY: ICD-10-CM

## 2017-07-17 DIAGNOSIS — K22.6: ICD-10-CM

## 2017-07-17 DIAGNOSIS — K22.6 MALLORY-WEISS TEAR: ICD-10-CM

## 2017-07-17 DIAGNOSIS — K25.7 CHRONIC GASTRIC ULCER WITHOUT HEMORRHAGE AND WITHOUT PERFORATION: Primary | ICD-10-CM

## 2017-07-17 LAB
ALBUMIN SERPL-MCNC: 4 G/DL (ref 3.4–5)
ALBUMIN UR-MCNC: NEGATIVE MG/DL
ALP SERPL-CCNC: 52 U/L (ref 40–150)
ALT SERPL W P-5'-P-CCNC: 16 U/L (ref 0–50)
ANION GAP SERPL CALCULATED.3IONS-SCNC: 7 MMOL/L (ref 3–14)
APPEARANCE UR: ABNORMAL
AST SERPL W P-5'-P-CCNC: 18 U/L (ref 0–45)
BASOPHILS # BLD AUTO: 0 10E9/L (ref 0–0.2)
BASOPHILS NFR BLD AUTO: 1 %
BILIRUB SERPL-MCNC: 0.3 MG/DL (ref 0.2–1.3)
BILIRUB UR QL STRIP: NEGATIVE
BUN SERPL-MCNC: 16 MG/DL (ref 7–30)
CALCIUM SERPL-MCNC: 8.4 MG/DL (ref 8.5–10.1)
CHLORIDE SERPL-SCNC: 106 MMOL/L (ref 94–109)
CO2 SERPL-SCNC: 28 MMOL/L (ref 20–32)
COLOR UR AUTO: YELLOW
CREAT SERPL-MCNC: 0.84 MG/DL (ref 0.52–1.04)
DIFFERENTIAL METHOD BLD: ABNORMAL
EOSINOPHIL # BLD AUTO: 0.1 10E9/L (ref 0–0.7)
EOSINOPHIL NFR BLD AUTO: 2.1 %
ERYTHROCYTE [DISTWIDTH] IN BLOOD BY AUTOMATED COUNT: 13.7 % (ref 10–15)
GFR SERPL CREATININE-BSD FRML MDRD: 78 ML/MIN/1.7M2
GLUCOSE SERPL-MCNC: 79 MG/DL (ref 70–99)
GLUCOSE UR STRIP-MCNC: NEGATIVE MG/DL
HCT VFR BLD AUTO: 37.4 % (ref 35–47)
HGB BLD-MCNC: 12.4 G/DL (ref 11.7–15.7)
HGB UR QL STRIP: NEGATIVE
IMM GRANULOCYTES # BLD: 0 10E9/L (ref 0–0.4)
IMM GRANULOCYTES NFR BLD: 0.3 %
KETONES UR STRIP-MCNC: 20 MG/DL
LEUKOCYTE ESTERASE UR QL STRIP: NEGATIVE
LIPASE SERPL-CCNC: 175 U/L (ref 73–393)
LYMPHOCYTES # BLD AUTO: 1.1 10E9/L (ref 0.8–5.3)
LYMPHOCYTES NFR BLD AUTO: 27.9 %
MCH RBC QN AUTO: 33.9 PG (ref 26.5–33)
MCHC RBC AUTO-ENTMCNC: 33.2 G/DL (ref 31.5–36.5)
MCV RBC AUTO: 102 FL (ref 78–100)
MONOCYTES # BLD AUTO: 0.2 10E9/L (ref 0–1.3)
MONOCYTES NFR BLD AUTO: 6.2 %
MUCOUS THREADS #/AREA URNS LPF: PRESENT /LPF
NEUTROPHILS # BLD AUTO: 2.4 10E9/L (ref 1.6–8.3)
NEUTROPHILS NFR BLD AUTO: 62.5 %
NITRATE UR QL: NEGATIVE
PH UR STRIP: 5 PH (ref 5–7)
PLATELET # BLD AUTO: 276 10E9/L (ref 150–450)
POTASSIUM SERPL-SCNC: 4 MMOL/L (ref 3.4–5.3)
PROT SERPL-MCNC: 7.5 G/DL (ref 6.8–8.8)
RBC # BLD AUTO: 3.66 10E12/L (ref 3.8–5.2)
RBC #/AREA URNS AUTO: 1 /HPF (ref 0–2)
SODIUM SERPL-SCNC: 141 MMOL/L (ref 133–144)
SP GR UR STRIP: 1.02 (ref 1–1.03)
SQUAMOUS #/AREA URNS AUTO: 2 /HPF (ref 0–1)
URN SPEC COLLECT METH UR: ABNORMAL
UROBILINOGEN UR STRIP-MCNC: 0 MG/DL (ref 0–2)
WBC # BLD AUTO: 3.9 10E9/L (ref 4–11)
WBC #/AREA URNS AUTO: 1 /HPF (ref 0–2)

## 2017-07-17 PROCEDURE — 96361 HYDRATE IV INFUSION ADD-ON: CPT | Performed by: FAMILY MEDICINE

## 2017-07-17 PROCEDURE — 83690 ASSAY OF LIPASE: CPT | Performed by: FAMILY MEDICINE

## 2017-07-17 PROCEDURE — 25000125 ZZHC RX 250: Performed by: FAMILY MEDICINE

## 2017-07-17 PROCEDURE — 96374 THER/PROPH/DIAG INJ IV PUSH: CPT | Performed by: FAMILY MEDICINE

## 2017-07-17 PROCEDURE — 25000128 H RX IP 250 OP 636: Performed by: FAMILY MEDICINE

## 2017-07-17 PROCEDURE — 80053 COMPREHEN METABOLIC PANEL: CPT | Performed by: FAMILY MEDICINE

## 2017-07-17 PROCEDURE — 81001 URINALYSIS AUTO W/SCOPE: CPT | Performed by: FAMILY MEDICINE

## 2017-07-17 PROCEDURE — 25000132 ZZH RX MED GY IP 250 OP 250 PS 637: Performed by: FAMILY MEDICINE

## 2017-07-17 PROCEDURE — 96375 TX/PRO/DX INJ NEW DRUG ADDON: CPT | Performed by: FAMILY MEDICINE

## 2017-07-17 PROCEDURE — 96376 TX/PRO/DX INJ SAME DRUG ADON: CPT | Performed by: FAMILY MEDICINE

## 2017-07-17 PROCEDURE — 74020 XR ABDOMEN 2 VW: CPT | Mod: TC

## 2017-07-17 PROCEDURE — 99285 EMERGENCY DEPT VISIT HI MDM: CPT | Mod: 25 | Performed by: FAMILY MEDICINE

## 2017-07-17 PROCEDURE — 99284 EMERGENCY DEPT VISIT MOD MDM: CPT | Mod: Z6 | Performed by: FAMILY MEDICINE

## 2017-07-17 PROCEDURE — 85025 COMPLETE CBC W/AUTO DIFF WBC: CPT | Performed by: FAMILY MEDICINE

## 2017-07-17 RX ORDER — HYDROMORPHONE HYDROCHLORIDE 1 MG/ML
0.5 INJECTION, SOLUTION INTRAMUSCULAR; INTRAVENOUS; SUBCUTANEOUS
Status: DISCONTINUED | OUTPATIENT
Start: 2017-07-17 | End: 2017-07-17 | Stop reason: HOSPADM

## 2017-07-17 RX ORDER — SODIUM CHLORIDE 9 MG/ML
1000 INJECTION, SOLUTION INTRAVENOUS CONTINUOUS
Status: DISCONTINUED | OUTPATIENT
Start: 2017-07-17 | End: 2017-07-17 | Stop reason: HOSPADM

## 2017-07-17 RX ORDER — LIDOCAINE 40 MG/G
CREAM TOPICAL
Status: DISCONTINUED | OUTPATIENT
Start: 2017-07-17 | End: 2017-07-17 | Stop reason: HOSPADM

## 2017-07-17 RX ORDER — ONDANSETRON 2 MG/ML
8 INJECTION INTRAMUSCULAR; INTRAVENOUS ONCE
Status: COMPLETED | OUTPATIENT
Start: 2017-07-17 | End: 2017-07-17

## 2017-07-17 RX ORDER — HYDROCODONE BITARTRATE AND ACETAMINOPHEN 5; 325 MG/1; MG/1
1-2 TABLET ORAL EVERY 4 HOURS PRN
Qty: 10 TABLET | Refills: 0 | Status: SHIPPED | OUTPATIENT
Start: 2017-07-17 | End: 2017-07-19

## 2017-07-17 RX ORDER — HYDROMORPHONE HYDROCHLORIDE 1 MG/ML
0.5 INJECTION, SOLUTION INTRAMUSCULAR; INTRAVENOUS; SUBCUTANEOUS
Status: COMPLETED | OUTPATIENT
Start: 2017-07-17 | End: 2017-07-17

## 2017-07-17 RX ADMIN — SODIUM CHLORIDE 1000 ML: 9 INJECTION, SOLUTION INTRAVENOUS at 12:25

## 2017-07-17 RX ADMIN — PROCHLORPERAZINE EDISYLATE 10 MG: 5 INJECTION INTRAMUSCULAR; INTRAVENOUS at 12:27

## 2017-07-17 RX ADMIN — HYDROMORPHONE HYDROCHLORIDE 0.5 MG: 1 INJECTION, SOLUTION INTRAMUSCULAR; INTRAVENOUS; SUBCUTANEOUS at 15:40

## 2017-07-17 RX ADMIN — SODIUM CHLORIDE 1000 ML: 9 INJECTION, SOLUTION INTRAVENOUS at 14:40

## 2017-07-17 RX ADMIN — LIDOCAINE HYDROCHLORIDE 30 ML: 20 SOLUTION ORAL; TOPICAL at 15:49

## 2017-07-17 RX ADMIN — HYDROMORPHONE HYDROCHLORIDE 0.5 MG: 1 INJECTION, SOLUTION INTRAMUSCULAR; INTRAVENOUS; SUBCUTANEOUS at 13:01

## 2017-07-17 RX ADMIN — LIDOCAINE HYDROCHLORIDE 30 ML: 20 SOLUTION ORAL; TOPICAL at 12:32

## 2017-07-17 RX ADMIN — ONDANSETRON 8 MG: 2 INJECTION INTRAMUSCULAR; INTRAVENOUS at 15:51

## 2017-07-17 RX ADMIN — HYDROMORPHONE HYDROCHLORIDE 0.5 MG: 1 INJECTION, SOLUTION INTRAMUSCULAR; INTRAVENOUS; SUBCUTANEOUS at 16:29

## 2017-07-17 RX ADMIN — SODIUM CHLORIDE 1000 ML: 9 INJECTION, SOLUTION INTRAVENOUS at 13:25

## 2017-07-17 NOTE — ED NOTES
Patient is working on eating ice chips and is going to start drinking water again to see if she can tolerate liquids. 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

## 2017-07-17 NOTE — ED NOTES
Pt. Requesting more pain meds.  MD notified.  Water also provided per MD.  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

## 2017-07-17 NOTE — ED AVS SNAPSHOT
[facility] Emergency Department    [address] [place] [address]    Phone:  [phone]    Fax:  [phone]                                       Lida Blood   MRN: [id number]    Department:  [facility] Emergency Department   Date of Visit:  7/17/2017           After Visit Summary Signature Page     I have received my discharge instructions, and my questions have been answered. I have discussed any challenges I see with this plan with the nurse or doctor.    ..........................................................................................................................................  Patient/Patient Representative Signature      ..........................................................................................................................................  Patient Representative Print Name and Relationship to Patient    ..................................................               ................................................  Date                                            Time    ..........................................................................................................................................  Reviewed by Signature/Title    ...................................................              ..............................................  Date                                                            Time

## 2017-07-17 NOTE — ED NOTES
"Pt. Up to use bathroom and able to provide urine sample.  States she is \"feeling okay\" after walking.   "

## 2017-07-17 NOTE — ED PROVIDER NOTES
History     Chief Complaint   Patient presents with     Hematemesis     HPI  Lida Blood is a 31 year old female who presents with abdominal pain that started up this morning.  Patient has a history of gastric ulcers and this feels similar to when this accident.  She states she had an episode of vomiting blood also this morning.  Patient stage she did eat some pizza last night and is not been sleeping well the last few days.  Normally when this starts the flareup, she can take a lot of Maalox in this helps.  She is currently on Carafate and omeprazole and has seen a G.I. Doc often.  She had an endoscopy recently but did confirm she does have an ulcer that she has.  She currently has some mild upper abdominal pain.  She denies any back pain.  She states the pain is described as burning in nature.  Nothing seems to make the pain better or worse.    I have reviewed the Medications, Allergies, Past Medical and Surgical History, and Social History in the Epic system.        Review of Systems   All other systems reviewed and are negative.      Physical Exam   BP: (!) 121/97  Pulse: 64  Heart Rate: 53  Temp: 98.3  F (36.8  C)  Resp: 20  Weight: 71.2 kg (157 lb)  SpO2: 100 %  Physical Exam   Constitutional: She appears well-developed and well-nourished.   Eyes: Conjunctivae are normal.   Cardiovascular: Normal rate, regular rhythm and normal heart sounds.    Pulmonary/Chest: Effort normal and breath sounds normal. No respiratory distress. She has no wheezes.   Abdominal: Soft. Bowel sounds are normal. She exhibits no distension. There is tenderness (epigastric). There is no rebound.   Skin: Skin is warm and dry. No rash noted.   Nursing note and vitals reviewed.      ED Course     ED Course     Procedures         Results for orders placed or performed during the hospital encounter of 07/17/17   XR Abdomen 2 Views    Narrative    XR ABDOMEN 2 VW 7/17/2017 4:11 PM    COMPARISON: 3/25/2017    HISTORY: Abdominal  pain and nausea.      Impression    IMPRESSION: No gas-filled loops of distended large or small bowel.  Surgical changes are again seen over the abdomen. Lung bases are  clear. No free air.    RUBIN BARRETO   CBC with platelets differential   Result Value Ref Range    WBC 3.9 (L) 4.0 - 11.0 10e9/L    RBC Count 3.66 (L) 3.8 - 5.2 10e12/L    Hemoglobin 12.4 11.7 - 15.7 g/dL    Hematocrit 37.4 35.0 - 47.0 %     (H) 78 - 100 fl    MCH 33.9 (H) 26.5 - 33.0 pg    MCHC 33.2 31.5 - 36.5 g/dL    RDW 13.7 10.0 - 15.0 %    Platelet Count 276 150 - 450 10e9/L    Diff Method Automated Method     % Neutrophils 62.5 %    % Lymphocytes 27.9 %    % Monocytes 6.2 %    % Eosinophils 2.1 %    % Basophils 1.0 %    % Immature Granulocytes 0.3 %    Absolute Neutrophil 2.4 1.6 - 8.3 10e9/L    Absolute Lymphocytes 1.1 0.8 - 5.3 10e9/L    Absolute Monocytes 0.2 0.0 - 1.3 10e9/L    Absolute Eosinophils 0.1 0.0 - 0.7 10e9/L    Absolute Basophils 0.0 0.0 - 0.2 10e9/L    Abs Immature Granulocytes 0.0 0 - 0.4 10e9/L   Comprehensive metabolic panel   Result Value Ref Range    Sodium 141 133 - 144 mmol/L    Potassium 4.0 3.4 - 5.3 mmol/L    Chloride 106 94 - 109 mmol/L    Carbon Dioxide 28 20 - 32 mmol/L    Anion Gap 7 3 - 14 mmol/L    Glucose 79 70 - 99 mg/dL    Urea Nitrogen 16 7 - 30 mg/dL    Creatinine 0.84 0.52 - 1.04 mg/dL    GFR Estimate 78 >60 mL/min/1.7m2    GFR Estimate If Black >90   GFR Calc   >60 mL/min/1.7m2    Calcium 8.4 (L) 8.5 - 10.1 mg/dL    Bilirubin Total 0.3 0.2 - 1.3 mg/dL    Albumin 4.0 3.4 - 5.0 g/dL    Protein Total 7.5 6.8 - 8.8 g/dL    Alkaline Phosphatase 52 40 - 150 U/L    ALT 16 0 - 50 U/L    AST 18 0 - 45 U/L   Lipase   Result Value Ref Range    Lipase 175 73 - 393 U/L   UA with Microscopic   Result Value Ref Range    Color Urine Yellow     Appearance Urine Slightly Cloudy     Glucose Urine Negative NEG mg/dL    Bilirubin Urine Negative NEG    Ketones Urine 20 (A) NEG mg/dL    Specific Gravity  Urine 1.024 1.003 - 1.035    Blood Urine Negative NEG    pH Urine 5.0 5.0 - 7.0 pH    Protein Albumin Urine Negative NEG mg/dL    Urobilinogen mg/dL 0.0 0.0 - 2.0 mg/dL    Nitrite Urine Negative NEG    Leukocyte Esterase Urine Negative NEG    Source Unspecified Urine     WBC Urine 1 0 - 2 /HPF    RBC Urine 1 0 - 2 /HPF    Squamous Epithelial /HPF Urine 2 (H) 0 - 1 /HPF    Mucous Urine Present (A) NEG /LPF     Medications   lidocaine 1 % 1 mL (not administered)   lidocaine (LMX4) kit (not administered)   sodium chloride (PF) 0.9% PF flush 3 mL (not administered)   sodium chloride (PF) 0.9% PF flush 3 mL (3 mLs Intracatheter Not Given 7/17/17 1326)   0.9% sodium chloride BOLUS (0 mLs Intravenous Stopped 7/17/17 1325)     Followed by   0.9% sodium chloride infusion (1,000 mLs Intravenous New Bag 7/17/17 1440)   HYDROmorphone (PF) (DILAUDID) injection 0.5 mg (0.5 mg Intravenous Given 7/17/17 1629)   prochlorperazine (COMPAZINE) injection 10 mg (10 mg Intravenous Given 7/17/17 1227)   lidocaine (viscous) (XYLOCAINE) 2 % 15 mL, alum & mag hydroxide-simethicone (MYLANTA ES/MAALOX  ES) 15 mL GI Cocktail (30 mLs Oral Given 7/17/17 1232)   HYDROmorphone (PF) (DILAUDID) injection 0.5 mg (0.5 mg Intravenous Given 7/17/17 1301)   0.9% sodium chloride BOLUS (0 mLs Intravenous Stopped 7/17/17 1439)   ondansetron (ZOFRAN) injection 8 mg (8 mg Intravenous Given 7/17/17 1551)   lidocaine (viscous) (XYLOCAINE) 2 % 15 mL, alum & mag hydroxide-simethicone (MYLANTA ES/MAALOX  ES) 15 mL GI Cocktail (30 mLs Oral Given 7/17/17 1549)     labs are reviewed and patient it has some ketones in her urine but otherwise stable labs.  Patient was monitored here for almost 4 hours and she finally feels better after combination of the above medications were given.  She was able to keep water down and crackers without any vomiting.  I suspect that maybe the pizza last night flared up her symptoms.  A discharge her home with a medication.  She will  continue to use Zofran as needed for pain , she will continue to use Prilosec and Carafate as previously directed.  I will have the patient follow-up with her SANIA Michael this week.  The vomiting blood, I think this is most likely a Kerri-Toledo tear.  She had no melanoma here and a stable hemoglobin and stable vitals.  She's had no more vomiting blood since she's been here.  She's not feeling lightheaded or dizzy.    Assessments & Plan (with Medical Decision Making)  gastric ulcer, Kerri-[place] tear     I have reviewed the nursing notes.    I have reviewed the findings, diagnosis, plan and need for follow up with the patient.        7/17/2017   [facility] EMERGENCY DEPARTMENT     Amadou Pablo MD  07/17/17 5522

## 2017-07-17 NOTE — ED AVS SNAPSHOT
[facility] Emergency Department    [address]     [place] [address]    Phone:  [phone]    Fax:  [phone]                                       Lida Blood   MRN: [id number]    Department:  [facility] Emergency Department   Date of Visit:  7/17/2017           Patient Information     Date Of Birth          1985        Your diagnoses for this visit were:     Kerri-Toledo tear     Chronic gastric ulcer without hemorrhage and without perforation        You were seen by Amadou Pablo MD.      Follow-up Information     Follow up with Joaquin Dudley MD. Schedule an appointment as soon as possible for a visit in 2 days.    Specialty:  Family Practice    Why:  If not improving., For follow up on your ED stay    Contact information:    [facility]  [address] [facility] [address]  [phone]          Discharge Instructions         Understanding Gastric Ulcers    A gastric ulcer is an open sore in the stomach lining. It is sometimes called a peptic ulcer. This is a more general term for ulcers that may be in the stomach or the upper part of the small intestine. Ulcers can cause pain. But they may also have no symptoms for a long time.  What causes gastric ulcers?  Gastric ulcers have a few common causes. To find the cause of your ulcer, your healthcare provider will give you an exam and take your health history. He or she may also order some tests. The main causes of gastric ulcers include:    Infection with the H. pylori (Helicobacter pylori) bacteria. This damages the stomach lining. Digestive juices can then harm the digestive tract.    Long-term use of some over-the-counter pain medicines. This reduces the body s ability to protect the stomach from damage.  Other causes of gastric ulcers include:    Heavy alcohol use    Having a family history of ulcers    In rare cases, a tumor in the digestive tract may cause an ulcer  Symptoms of a gastric  ulcer  Ulcer symptoms may appear and then go away for a time. Symptoms of a gastric ulcer may include:    Stomach pain, often a dull or burning feeling toward the top of your belly    Feeling full or bloated    Heartburn or acid reflux    Upset stomach (nausea) or vomiting    Vomiting blood    Lack of appetite    Weight loss    Black stool    Red blood in the stool  Treatment for a gastric ulcer  Treatment for gastric ulcers may depend on what is causing them. Treatment may include:    Not using over-the-counter medicines. You will likely need to stop taking these medicines. But in some cases these medicines can t be safely stopped. Check with your healthcare provider to see what is best for you.     Taking medicines to ease symptoms. These medicines may help to reduce the amount of acid your stomach makes. They also may help coat your stomach lining.    Taking antibiotics. If your ulcer was caused by H. pylori, your provider will likely prescribe antibiotics to get rid of the infection.    Having an endoscopy. This is often done to check the stomach and diagnose the ulcer. In some cases it can also treat the ulcer. It involves passing a flexible tube through your mouth into your stomach and small intestine.    Using a tube (catheter) to stop the bleeding. A thin tube is passed into one of your blood vessels. Special tools are used to help stop the bleeding.    Having surgery. You often may need this if the ulcer has caused severe symptoms.  Making some lifestyle changes can reduce ulcer symptoms. It may also prevent more damage to your digestive tract. These changes include:    Not taking over-the-counter pain medicines. Talk with your provider about using another type of pain reliever.    Not taking aspirin unless your provider has advised it    Limiting the amount of alcohol you drink    Quitting smoking  Possible complications of a gastric ulcer  Gastric ulcers can have serious complications. These can  include:    Bleeding into the stomach    A hole (perforation) in the stomach    A blockage that interferes with food moving from your stomach to the small intestine  An ongoing infection with H. pylori may be a risk factor for stomach cancer. This is one reason it is important to get rid of this bacteria.  When to call your healthcare provider  Call your healthcare provider right away if you have any of these:    Vomiting blood, or vomit that looks like coffee grounds    Bloody, black, or tarry-looking stools    Fever of 100.4 F (38 C) or higher, or as directed    Pain that gets worse    Symptoms that don t get better with treatment, or symptoms that get worse    New symptoms   Date Last Reviewed: 5/1/2016 2000-2017 [employer]. [address] [place] [address], [place] [address]. All rights reserved. This information is not intended as a substitute for professional medical care. Always follow your healthcare professional's instructions.          Kerri-Toledo Tear    Your esophagus is the tube that carries food from your mouth to your stomach. It plays an important role in digestion. Sometimes a person can tear the tissue of the lower esophagus, and it can start to bleed. This is called a Kerri-Toledo tear.  Causes and symptoms of a Kerri-Toledo tear  The most common cause of a tear is violent coughing or vomiting. Increased pressure in your belly (abdomen) from a hiatal hernia or childbirth can also lead to a tear. Seizures may cause a tear, as can alcoholism leading to severe vomiting. Increasing age also increases the risk of a Kerri-Toledo tear.  A tear can cause symptoms such as:    Vomit that is bright red or looks like coffee grounds    Stools that are black or sticky like tar    Weakness, dizziness, faintness, or shortness of breath    Diarrhea    Abdominal pain  If the bleeding is not treated, it can continue for a long time. This can cause anemia, fatigue, and shortness of breath.  Diagnosing  and treating a Kerri-Toledo tear  If you have symptoms, your healthcare provider may test your stool to look for blood. You may also have an endoscopy. This is a procedure to look at your esophagus. It uses a tool called an endoscope. This is a thin, flexible tube with a camera and light  at the end. After giving you some light sedation, the scope is put through your mouth and down into your esophagus. This lets your healthcare provider look at the inside of your esophagus.  In most cases, a Kerri-Toledo tear will stop bleeding and heal on its own. But some people will need treatment. If needed, your healthcare provider will treat your tear through the endoscope. You may have an injection to make the bleeding stop. Or, you may be given a heat treatment to close the wound. In some cases, a tiny clip may be used to close the tear. Depending on how much blood is lost, you may need a blood transfusion.     When to call the healthcare provider  In rare cases, a Kerri-Toledo tear can lead to severe internal bleeding. This is a medical emergency. Call 911 if you have:    A rapid, weak pulse    Significant vomiting of blood    A blue tint to your lips or fingernails    Very little urine    Pale skin that s cool and moist to the touch    Confusion    Shallow breathing   Date Last Reviewed: 4/9/2016 2000-2017 [employer]. [address] [place] [address]. All rights reserved. This information is not intended as a substitute for professional medical care. Always follow your healthcare professional's instructions.          24 Hour Appointment Hotline       To make an appointment at any [facility], call [phone] ([phone]). If you don't have a family doctor or clinic, we will help you find one. [place] clinics are conveniently located to serve the needs of you and your family.             Review of your medicines      START taking        Dose / Directions Last dose taken     HYDROcodone-acetaminophen 5-325 MG per tablet   Commonly known as:  NORCO   Dose:  1-2 tablet   Quantity:  10 tablet        Take 1-2 tablets by mouth every 4 hours as needed   Refills:  0          Our records show that you are taking the medicines listed below. If these are incorrect, please call your family doctor or clinic.        Dose / Directions Last dose taken    CALCIUM PO        Take by mouth daily   Refills:  0        diclofenac 75 MG EC tablet   Commonly known as:  VOLTAREN   Dose:  75 mg   Quantity:  60 tablet        Take 1 tablet (75 mg) by mouth 2 times daily   Refills:  1        escitalopram 20 MG tablet   Commonly known as:  LEXAPRO   Dose:  20 mg   Quantity:  90 tablet        Take 1 tablet (20 mg) by mouth daily   Refills:  1        estradiol 0.075 MG/24HR Ptwk   Dose:  1 patch   Quantity:  12 patch        Place 1 patch onto the skin once a week   Refills:  3        * levothyroxine 75 MCG tablet   Commonly known as:  SYNTHROID/LEVOTHROID   Dose:  75 mcg   Quantity:  90 tablet        Take 1 tablet (75 mcg) by mouth daily Take along with 200 mcg tablet daily.   Refills:  1        * levothyroxine 200 MCG tablet   Commonly known as:  SYNTHROID/LEVOTHROID   Dose:  200 mcg   Quantity:  30 tablet        Take 1 tablet (200 mcg) by mouth daily   Refills:  1        misoprostol 200 MCG tablet   Commonly known as:  CYTOTEC   Dose:  200 mcg   Quantity:  120 tablet        Take 1 tablet (200 mcg) by mouth every 6 hours   Refills:  0        OMEPRAZOLE PO   Dose:  40 mg        Take 40 mg by mouth 2 times daily (before meals)   Refills:  0        VITAMIN D PO        Take by mouth daily   Refills:  0        * Notice:  This list has 2 medication(s) that are the same as other medications prescribed for you. Read the directions carefully, and ask your doctor or other care provider to review them with you.            Prescriptions were sent or printed at these locations (1 Prescription)                   [facility]  [facility] [place] [place] - [address]   [address], [facility] [address]    Telephone:  [phone]   Fax:  [phone]   Hours:                  Printed at Department/Unit printer (1 of 1)         HYDROcodone-acetaminophen (NORCO) 5-325 MG per tablet                Procedures and tests performed during your visit     CBC with platelets differential    Comprehensive metabolic panel    Lipase    Peripheral IV catheter    UA with Microscopic    XR Abdomen 2 Views      Orders Needing Specimen Collection     None      Pending Results     No orders found from 7/15/2017 to 7/18/2017.            Pending Culture Results     No orders found from 7/15/2017 to 7/18/2017.            Pending Results Instructions     If you had any lab results that were not finalized at the time of your Discharge, you can call the ED Lab Result RN at [phone]. You will be contacted by this team for any positive Lab results or changes in treatment. The nurses are available 7 days a week from 10A to 6:30P.  You can leave a message 24 hours per day and they will return your call.        Thank you for choosing [facility]       Thank you for choosing [facility] for your care. Our goal is always to provide you with excellent care. Hearing back from our patients is one way we can continue to improve our services. Please take a few minutes to complete the written survey that you may receive in the mail after you visit with us. Thank you!        [facility] Information     [facility] [employer] gives you secure access to your electronic health record. If you see a primary care provider, you can also send messages to your care team and make appointments. If you have questions, please call your primary care clinic.  If you do not have a primary care provider, please call [phone] and they will assist you.        Care EveryWhere ID     This is your Care EveryWhere ID. This could be used by other organizations to access your [facility]  records  [id number]        Equal Access to Services     KAYDEN MORA : Dayana Vega, aspen gambino, garcía colon. So [facility] [phone].    ATENCIÓN: Si habla español, tiene a goldsmith disposición servicios gratuitos de asistencia lingüística. Llame al [phone].    We comply with applicable federal civil rights laws and [place] laws. We do not discriminate on the basis of race, color, national origin, age, disability sex, sexual orientation or gender identity.            After Visit Summary       This is your record. Keep this with you and show to your community pharmacist(s) and doctor(s) at your next visit.

## 2017-07-17 NOTE — ED NOTES
Pt. Requested some water.  MD notified and she was provided ice chips per MD. 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

## 2017-07-17 NOTE — DISCHARGE INSTRUCTIONS
Understanding Gastric Ulcers    A gastric ulcer is an open sore in the stomach lining. It is sometimes called a peptic ulcer. This is a more general term for ulcers that may be in the stomach or the upper part of the small intestine. Ulcers can cause pain. But they may also have no symptoms for a long time.  What causes gastric ulcers?  Gastric ulcers have a few common causes. To find the cause of your ulcer, your healthcare provider will give you an exam and take your health history. He or she may also order some tests. The main causes of gastric ulcers include:    Infection with the H. pylori (Helicobacter pylori) bacteria. This damages the stomach lining. Digestive juices can then harm the digestive tract.    Long-term use of some over-the-counter pain medicines. This reduces the body s ability to protect the stomach from damage.  Other causes of gastric ulcers include:    Heavy alcohol use    Having a family history of ulcers    In rare cases, a tumor in the digestive tract may cause an ulcer  Symptoms of a gastric ulcer  Ulcer symptoms may appear and then go away for a time. Symptoms of a gastric ulcer may include:    Stomach pain, often a dull or burning feeling toward the top of your belly    Feeling full or bloated    Heartburn or acid reflux    Upset stomach (nausea) or vomiting    Vomiting blood    Lack of appetite    Weight loss    Black stool    Red blood in the stool  Treatment for a gastric ulcer  Treatment for gastric ulcers may depend on what is causing them. Treatment may include:    Not using over-the-counter medicines. You will likely need to stop taking these medicines. But in some cases these medicines can t be safely stopped. Check with your healthcare provider to see what is best for you.     Taking medicines to ease symptoms. These medicines may help to reduce the amount of acid your stomach makes. They also may help coat your stomach lining.    Taking antibiotics. If your ulcer was caused  by H. pylori, your provider will likely prescribe antibiotics to get rid of the infection.    Having an endoscopy. This is often done to check the stomach and diagnose the ulcer. In some cases it can also treat the ulcer. It involves passing a flexible tube through your mouth into your stomach and small intestine.    Using a tube (catheter) to stop the bleeding. A thin tube is passed into one of your blood vessels. Special tools are used to help stop the bleeding.    Having surgery. You often may need this if the ulcer has caused severe symptoms.  Making some lifestyle changes can reduce ulcer symptoms. It may also prevent more damage to your digestive tract. These changes include:    Not taking over-the-counter pain medicines. Talk with your provider about using another type of pain reliever.    Not taking aspirin unless your provider has advised it    Limiting the amount of alcohol you drink    Quitting smoking  Possible complications of a gastric ulcer  Gastric ulcers can have serious complications. These can include:    Bleeding into the stomach    A hole (perforation) in the stomach    A blockage that interferes with food moving from your stomach to the small intestine  An ongoing infection with H. pylori may be a risk factor for stomach cancer. This is one reason it is important to get rid of this bacteria.  When to call your healthcare provider  Call your healthcare provider right away if you have any of these:    Vomiting blood, or vomit that looks like coffee grounds    Bloody, black, or tarry-looking stools    Fever of 100.4 F (38 C) or higher, or as directed    Pain that gets worse    Symptoms that don t get better with treatment, or symptoms that get worse    New symptoms   Date Last Reviewed: 5/1/2016 2000-2017 [employer]. [address] [place] [address], [place] [address]. All rights reserved. This information is not intended as a substitute for professional medical care. Always follow your  healthcare professional's instructions.          Kerri-Toledo Tear    Your esophagus is the tube that carries food from your mouth to your stomach. It plays an important role in digestion. Sometimes a person can tear the tissue of the lower esophagus, and it can start to bleed. This is called a Kerri-Toledo tear.  Causes and symptoms of a Kerri-Toledo tear  The most common cause of a tear is violent coughing or vomiting. Increased pressure in your belly (abdomen) from a hiatal hernia or childbirth can also lead to a tear. Seizures may cause a tear, as can alcoholism leading to severe vomiting. Increasing age also increases the risk of a Kerri-Toledo tear.  A tear can cause symptoms such as:    Vomit that is bright red or looks like coffee grounds    Stools that are black or sticky like tar    Weakness, dizziness, faintness, or shortness of breath    Diarrhea    Abdominal pain  If the bleeding is not treated, it can continue for a long time. This can cause anemia, fatigue, and shortness of breath.  Diagnosing and treating a Kerri-Toledo tear  If you have symptoms, your healthcare provider may test your stool to look for blood. You may also have an endoscopy. This is a procedure to look at your esophagus. It uses a tool called an endoscope. This is a thin, flexible tube with a camera and light  at the end. After giving you some light sedation, the scope is put through your mouth and down into your esophagus. This lets your healthcare provider look at the inside of your esophagus.  In most cases, a Kerri-Toledo tear will stop bleeding and heal on its own. But some people will need treatment. If needed, your healthcare provider will treat your tear through the endoscope. You may have an injection to make the bleeding stop. Or, you may be given a heat treatment to close the wound. In some cases, a tiny clip may be used to close the tear. Depending on how much blood is lost, you may need a blood  transfusion.     When to call the healthcare provider  In rare cases, a Kerri-Toledo tear can lead to severe internal bleeding. This is a medical emergency. Call 911 if you have:    A rapid, weak pulse    Significant vomiting of blood    A blue tint to your lips or fingernails    Very little urine    Pale skin that s cool and moist to the touch    Confusion    Shallow breathing   Date Last Reviewed: 4/9/2016 2000-2017 [employer]. [address] [place] [address], [place] [address]. All rights reserved. This information is not intended as a substitute for professional medical care. Always follow your healthcare professional's instructions.

## 2017-07-19 ENCOUNTER — OFFICE VISIT (OUTPATIENT)
Dept: FAMILY MEDICINE | Facility: OTHER | Age: 32
End: 2017-07-19
Payer: COMMERCIAL

## 2017-07-19 VITALS
RESPIRATION RATE: 20 BRPM | TEMPERATURE: 97.2 F | OXYGEN SATURATION: 98 % | BODY MASS INDEX: 25.79 KG/M2 | SYSTOLIC BLOOD PRESSURE: 120 MMHG | HEART RATE: 87 BPM | DIASTOLIC BLOOD PRESSURE: 72 MMHG | WEIGHT: 155 LBS

## 2017-07-19 DIAGNOSIS — K25.7 CHRONIC GASTRIC ULCER, UNSPECIFIED WHETHER GASTRIC ULCER HEMORRHAGE OR PERFORATION PRESENT: Primary | ICD-10-CM

## 2017-07-19 DIAGNOSIS — K21.9 GASTROESOPHAGEAL REFLUX DISEASE WITHOUT ESOPHAGITIS: ICD-10-CM

## 2017-07-19 PROCEDURE — 99213 OFFICE O/P EST LOW 20 MIN: CPT | Performed by: NURSE PRACTITIONER

## 2017-07-19 RX ORDER — HYDROCODONE BITARTRATE AND ACETAMINOPHEN 5; 325 MG/1; MG/1
1-2 TABLET ORAL EVERY 4 HOURS PRN
Qty: 20 TABLET | Refills: 0 | Status: SHIPPED | OUTPATIENT
Start: 2017-07-19 | End: 2017-08-10

## 2017-07-19 ASSESSMENT — PAIN SCALES - GENERAL: PAINLEVEL: MODERATE PAIN (4)

## 2017-07-19 NOTE — PATIENT INSTRUCTIONS
Call your surgeon's office today and see if you can get in sooner with them.     Continue on the same medications without change.

## 2017-07-19 NOTE — PROGRESS NOTES
SUBJECTIVE:                                                    Lida Blood is a 31 year old female who presents to clinic today for the following health issues:      Gastrointestinal symptoms      Duration: 7/16/17 to now    Description:           ABDOMINAL PAIN - right lower quadrant and upper left.   Pain is described as sharp, burning and worse in the a.m. and radiates to left side of back.      Intensity:  moderate    Accompanying signs and symptoms:  nausea, vomitting, chills, loose stools and bloating    History  Previous similar problem: YES  Previous evaluation:  Endoscopy 3/1/17    Aggravating factors: empty stomach    Alleviating factors: Maalox, Tums    Other Therapies tried: omeprazole not helping much anymore    Excerpt from ED record:   HPI  Lida Blood is a 31 year old female who presents with abdominal pain that started up this morning.  Patient has a history of gastric ulcers and this feels similar to when this accident.  She states she had an episode of vomiting blood also this morning.  Patient stage she did eat some pizza last night and is not been sleeping well the last few days.  Normally when this starts the flareup, she can take a lot of Maalox in this helps.  She is currently on Carafate and omeprazole and has seen a G.I. Doc often.  She had an endoscopy recently but did confirm she does have an ulcer that she has.  She currently has some mild upper abdominal pain.  She denies any back pain.  She states the pain is described as burning in nature.  Nothing seems to make the pain better or worse.    labs are reviewed and patient it has some ketones in her urine but otherwise stable labs.  Patient was monitored here for almost 4 hours and she finally feels better after combination of the above medications were given.  She was able to keep water down and crackers without any vomiting.  I suspect that maybe the pizza last night flared up her symptoms.  A discharge her home  with a medication.  She will continue to use Zofran as needed for pain , she will continue to use Prilosec and Carafate as previously directed.  I will have the patient follow-up with her SANIA Michael this week.  The vomiting blood, I think this is most likely a Kerri-Toledo tear.  She had no melanoma here and a stable hemoglobin and stable vitals.  She's had no more vomiting blood since she's been here.  She's not feeling lightheaded or dizzy.     Assessments & Plan (with Medical Decision Making)  gastric ulcer, Kerri-Toledo tear      Chronic issues since her Arely-en-Y gastric bypass in 2012.  Has been seeing her surgeon regarding this.  Her next visit is not until next month.    She is currently on Prilosec, Carafate and Maalox.  Continued gastric area pain.  Last vomited yesterday and there was a small amount of blood in it.  Has not been able to eat much due to the pain.     She got some [place] from the ED and has been using that for pain as well.  Those are almost gone now.         Problem list and histories reviewed & adjusted, as indicated.  Additional history: none    Patient Active Problem List   Diagnosis     Anxiety state     Hypothyroidism     Backache     Major depressive disorder, single episode, moderate (H)     Endometriosis     Vaginal discharge     Surgery follow-up examination     CARDIOVASCULAR SCREENING; LDL GOAL LESS THAN 160     Physiological ovarian cysts     Migraine headache     Abdominal pain, acute, epigastric     RLS (restless legs syndrome)     Vomiting and diarrhea     Abdominal pain     Gastroesophageal reflux disease without esophagitis     Hypokalemia     Hypoglycemia     Gastric ulcer     Obesity     Dehydration     LUQ abdominal pain     Ileus (H)     Past Surgical History:   Procedure Laterality Date     C APPENDECTOMY  4/18/2003     C REMOVAL OF CERVIX  05/29/09    laparoscopic assisted vaginal trachelectomy     C SUPRACERV ABD HYSTERECTOMY  10/06/08     COLONOSCOPY  11/25/14      ESOPHAGOSCOPY, GASTROSCOPY, DUODENOSCOPY (EGD), COMBINED N/A 8/31/2015    Procedure: COMBINED ESOPHAGOSCOPY, GASTROSCOPY, DUODENOSCOPY (EGD), BIOPSY SINGLE OR MULTIPLE;  Surgeon: Toni Wiggins MD;  Location:  GI     ESOPHAGOSCOPY, GASTROSCOPY, DUODENOSCOPY (EGD), COMBINED N/A 8/18/2016    Procedure: COMBINED ESOPHAGOSCOPY, GASTROSCOPY, DUODENOSCOPY (EGD);  Surgeon: Jaswinder Waddell MD;  Location:  GI     ESOPHAGOSCOPY, GASTROSCOPY, DUODENOSCOPY (EGD), COMBINED N/A 3/1/2017    Procedure: COMBINED ESOPHAGOSCOPY, GASTROSCOPY, DUODENOSCOPY (EGD), BIOPSY SINGLE OR MULTIPLE;  Surgeon: Jan Johnston MD;  Location:  GI     ESOPHAGOSCOPY, GASTROSCOPY, DUODENOSCOPY (EGD), DILATATION, COMBINED N/A 4/13/2016    Procedure: COMBINED ESOPHAGOSCOPY, GASTROSCOPY, DUODENOSCOPY (EGD), DILATATION;  Surgeon: Jos Dinero MD;  Location:  GI     EYE SURGERY      bilat orbital decompression surgery      HC COLONOSCOPY W BIOPSY  1/22/2007     HC COLONOSCOPY W/WO BRUSH/WASH  12/16/04     HC CREATE EARDRUM OPENING,GEN ANESTH  12/29/88    Bilateral myringotomy with insertion of PE tubes     HC CREATE EARDRUM OPENING,GEN ANESTH  12/20/90    Bilateral myringotomy with insertion of PE tubes     HC LAP,FULGURATE/EXCISE LESIONS  06/16/08    Laparoscopy, ablation of the endometriosis and diagnostic cystoscopy     HC LAPAROSCOPY, SURGICAL, ABDOMEN, PERITONEUM & OMENTUM; DX W/ OR W/O SPECIMEN(S)  04/18/2003    Diagnostic laparoscopy     HC REMOVAL GALLBLADDER  8/1/03     HC TYMPANOPLASTY W/O MASTOID, INIT/REV W/O OSS CHAIN RECONST  10/03/00    Left exploratory tympanoplasty, myringoplasty, fascia grafting of the oval window, microdissection via the use of operative microscope     HC UGI ENDOSCOPY, SIMPLE EXAM  3/23/12, 11/13/14     HERNIA REPAIR, INCISIONAL  08/23/2006    Laparoscopic mesh repair.     HYSTERECTOMY, PAP NO LONGER INDICATED  5/29/09     HYSTEROSCOPY  02/08/08     LAPAROSCOPIC GASTRECTOMY N/A 3/15/2016     Procedure: LAPAROSCOPIC GASTRECTOMY;  Surgeon: Jos Dinero MD;  Location: [facility]     LAPAROSCOPIC LYSIS ADHESIONS  11/02/10    [facility]     LAPAROSCOPIC REVISION GASTROPLASTY TO ELBERT-EN-Y N/A 10/26/2015    Procedure: LAPAROSCOPIC REVISION GASTROPLASTY TO ELBERT-EN-Y;  Surgeon: Toni Wiggins MD;  Location: [facility] OR     LAPAROSCOPIC SALPINGO-OOPHORECTOMY Left 3/2015     LAPAROSCOPY DIAGNOSTIC (GENERAL) Right 06/23/11    Evaluation of abdomen/pelvis, RSO, Cysto; [facility]       Social History   Substance Use Topics     Smoking status: Former Smoker     Packs/day: 0.25     Years: 6.00     Types: Cigarettes     Quit date: 6/16/2015     Smokeless tobacco: Never Used      Comment: social     Alcohol use Yes      Comment: rare     Family History   Problem Relation Age of Onset     Breast Cancer Mother      age 42 on chemo and radiation     Gynecology Mother      endometriosis     DIABETES Maternal Grandmother      type 1     Thyroid Disease Maternal Grandmother      C.A.D. Maternal Grandmother      Cardiovascular Maternal Grandmother      HEART DISEASE Maternal Grandmother      HEART DISEASE Paternal Grandmother      DIABETES Maternal Grandfather      diabetes     Thyroid Disease Maternal Aunt      HEART DISEASE Maternal Aunt      great MGA     Gynecology Sister      endometriosis     Anesthesia Reaction No family hx of          Current Outpatient Prescriptions   Medication Sig Dispense Refill     HYDROcodone-acetaminophen (NORCO) 5-325 MG per tablet Take 1-2 tablets by mouth every 4 hours as needed 10 tablet 0     misoprostol (CYTOTEC) 200 MCG tablet Take 1 tablet (200 mcg) by mouth every 6 hours 120 tablet 0     OMEPRAZOLE PO Take 40 mg by mouth 2 times daily (before meals)       levothyroxine (SYNTHROID, LEVOTHROID) 200 MCG tablet Take 1 tablet (200 mcg) by mouth daily 30 tablet 1     estradiol 0.075 MG/24HR PTWK Place 1 patch onto the skin once a week 12 patch 3     levothyroxine  (SYNTHROID, LEVOTHROID) 75 MCG tablet Take 1 tablet (75 mcg) by mouth daily Take along with 200 mcg tablet daily. 90 tablet 1     CALCIUM PO Take by mouth daily       Cholecalciferol (VITAMIN D PO) Take by mouth daily       escitalopram (LEXAPRO) 20 MG tablet Take 1 tablet (20 mg) by mouth daily 90 tablet 1     Allergies   Allergen Reactions     No Known Drug Allergies      BP Readings from Last 3 Encounters:   07/19/17 120/72   07/17/17 123/81   03/27/17 109/70    Wt Readings from Last 3 Encounters:   07/19/17 155 lb (70.3 kg)   07/17/17 157 lb (71.2 kg)   05/24/17 154 lb (69.9 kg)           Reviewed and updated as needed this visit by clinical staffTobacco  Allergies  Meds  Soc Hx      Reviewed and updated as needed this visit by Provider         ROS:  C: NEGATIVE for fever, chills, change in weight  E/M: NEGATIVE for ear, mouth and throat problems  R: NEGATIVE for significant cough or SOB  CV: NEGATIVE for chest pain, palpitations or peripheral edema  GI: POSITIVE for abdominal pain epigastric, hematemesis, nausea and vomiting and NEGATIVE for constipation, diarrhea and weight loss  : NEGATIVE for dysuria, hematuria, flank pain     OBJECTIVE:     /72  Pulse 87  Temp 97.2  F (36.2  C) (Tympanic)  Resp 20  Wt 155 lb (70.3 kg)  LMP 09/01/2008  SpO2 98%  Breastfeeding? No  BMI 25.79 kg/m2  Body mass index is 25.79 kg/(m^2).  GENERAL: healthy, alert and no distress  NECK: no adenopathy, no asymmetry, masses, or scars and thyroid normal to palpation  RESP: lungs clear to auscultation - no rales, rhonchi or wheezes  CV: regular rate and rhythm, normal S1 S2, no S3 or S4, no murmur, click or rub, no peripheral edema and peripheral pulses strong  ABDOMEN: soft, tenderness epigastric and no organomegaly or masses  MS: no gross musculoskeletal defects noted, no edema    Diagnostic Test Results:  none     ASSESSMENT/PLAN:       1. Chronic gastric ulcer, unspecified whether gastric ulcer hemorrhage or  perforation present  She needs to get back in with her GI specialist and I asked her to call them today. I refilled her Norco in the meantime.  Continue on other medications without change.   - HYDROcodone-acetaminophen (NORCO) 5-325 MG per tablet; Take 1-2 tablets by mouth every 4 hours as needed  Dispense: 20 tablet; Refill: 0    2. Gastroesophageal reflux disease without esophagitis  As above       See Patient Instructions  Follow up with GI as soon as possible.  I discussed symptoms that would warrant immediate evaluation and she verbalized understanding of those.     KATE Norman [facility]

## 2017-07-19 NOTE — MR AVS SNAPSHOT
After Visit Summary   7/19/2017    Lida Blood    MRN: [id number]           Patient Information     Date Of Birth          1985        Visit Information        Provider Department      7/19/2017 10:40 AM Judy Cota APRN CNP [facility]        Today's Diagnoses     Chronic gastric ulcer, unspecified whether gastric ulcer hemorrhage or perforation present    -  1    Gastroesophageal reflux disease without esophagitis          Care Instructions    Call your surgeon's office today and see if you can get in sooner with them.     Continue on the same medications without change.               Follow-ups after your visit        Who to contact     If you have questions or need follow up information about today's clinic visit or your schedule please contact [facility] directly at [phone].  Normal or non-critical lab and imaging results will be communicated to you by [employer], letter or phone within 4 business days after the clinic has received the results. If you do not hear from us within 7 days, please contact the clinic through [employer] or phone. If you have a critical or abnormal lab result, we will notify you by phone as soon as possible.  Submit refill requests through [employer] or call your pharmacy and they will forward the refill request to us. Please allow 3 business days for your refill to be completed.          Additional Information About Your Visit        [employer] Information     [employer] gives you secure access to your electronic health record. If you see a primary care provider, you can also send messages to your care team and make appointments. If you have questions, please call your primary care clinic.  If you do not have a primary care provider, please call [phone] and they will assist you.        Care EveryWhere ID     This is your Care EveryWhere ID. This could be used by other organizations to access your [facility]  records  [id number]        Your Vitals Were     Pulse Temperature Respirations Last Period Pulse Oximetry Breastfeeding?    87 97.2  F (36.2  C) (Tympanic) 20 09/01/2008 98% No    BMI (Body Mass Index)                   25.79 kg/m2            Blood Pressure from Last 3 Encounters:   07/19/17 120/72   07/17/17 123/81   03/27/17 109/70    Weight from Last 3 Encounters:   07/19/17 155 lb (70.3 kg)   07/17/17 157 lb (71.2 kg)   05/24/17 154 lb (69.9 kg)              Today, you had the following     No orders found for display         Today's Medication Changes          These changes are accurate as of: 7/19/17 10:57 AM.  If you have any questions, ask your nurse or doctor.               Stop taking these medicines if you haven't already. Please contact your care team if you have questions.     diclofenac 75 MG EC tablet   Commonly known as:  VOLTAREN   Stopped by:  Judy Cota APRN CNP                Where to get your medicines      Some of these will need a paper prescription and others can be bought over the counter.  Ask your nurse if you have questions.     Bring a paper prescription for each of these medications     HYDROcodone-acetaminophen 5-325 MG per tablet                Primary Care Provider Office Phone # Fax #    Joaquin Dudley [phone]       [facility] [address] [facility] [address]        Equal Access to Services     KAYDEN MORA : Dayana borden Socindi, waaxda luqadaha, qaybta kaalmamei maria, garcía vergara. So [facility] [phone].    ATENCIÓN: Si habla español, tiene a goldsmith disposición servicios gratuitos de asistencia lingüística. Melissa hutchison [phone].    We comply with applicable federal civil rights laws and [place] laws. We do not discriminate on the basis of race, color, national origin, age, disability sex, sexual orientation or gender identity.            Thank you!     Thank you for choosing [facility]   for your care. Our goal is always to provide you with excellent care. Hearing back from our patients is one way we can continue to improve our services. Please take a few minutes to complete the written survey that you may receive in the mail after your visit with us. Thank you!             Your Updated Medication List - Protect others around you: Learn how to safely use, store and throw away your medicines at www.disposemymeds.org.          This list is accurate as of: 7/19/17 10:57 AM.  Always use your most recent med list.                   Brand Name Dispense Instructions for use Diagnosis    CALCIUM PO      Take by mouth daily        escitalopram 20 MG tablet    LEXAPRO    90 tablet    Take 1 tablet (20 mg) by mouth daily    Major depressive disorder, single episode, moderate (H)       estradiol 0.075 MG/24HR Ptwk     12 patch    Place 1 patch onto the skin once a week    Symptomatic menopausal or female climacteric states       HYDROcodone-acetaminophen 5-325 MG per tablet    NORCO    20 tablet    Take 1-2 tablets by mouth every 4 hours as needed    Chronic gastric ulcer, unspecified whether gastric ulcer hemorrhage or perforation present       * levothyroxine 75 MCG tablet    SYNTHROID/LEVOTHROID    90 tablet    Take 1 tablet (75 mcg) by mouth daily Take along with 200 mcg tablet daily.    Acquired hypothyroidism       * levothyroxine 200 MCG tablet    SYNTHROID/LEVOTHROID    30 tablet    Take 1 tablet (200 mcg) by mouth daily    Acquired hypothyroidism       misoprostol 200 MCG tablet    CYTOTEC    120 tablet    Take 1 tablet (200 mcg) by mouth every 6 hours    Abdominal pain, acute, epigastric       OMEPRAZOLE PO      Take 40 mg by mouth 2 times daily (before meals)        VITAMIN D PO      Take by mouth daily        * Notice:  This list has 2 medication(s) that are the same as other medications prescribed for you. Read the directions carefully, and ask your doctor or other care provider to review them with  you.

## 2017-07-19 NOTE — LETTER
[facility]  [address] [facility] [address]  Phone: [phone]    July 19, 2017        Lida Blood  [address] [address] [address]          To whom it may concern:    RE: Lida Blood    Patient was seen and treated today at our clinic and missed work.  She is unable to return to work until 7/24/17    Please contact me for questions or concerns.      Sincerely,        KATE Norman CNP

## 2017-07-20 ASSESSMENT — PATIENT HEALTH QUESTIONNAIRE - PHQ9: SUM OF ALL RESPONSES TO PHQ QUESTIONS 1-9: 5

## 2017-08-10 ENCOUNTER — OFFICE VISIT (OUTPATIENT)
Dept: FAMILY MEDICINE | Facility: OTHER | Age: 32
End: 2017-08-10
Payer: COMMERCIAL

## 2017-08-10 VITALS
HEIGHT: 65 IN | TEMPERATURE: 96.6 F | SYSTOLIC BLOOD PRESSURE: 118 MMHG | HEART RATE: 72 BPM | WEIGHT: 154.5 LBS | DIASTOLIC BLOOD PRESSURE: 70 MMHG | RESPIRATION RATE: 16 BRPM | BODY MASS INDEX: 25.74 KG/M2

## 2017-08-10 DIAGNOSIS — K21.9 GASTROESOPHAGEAL REFLUX DISEASE WITHOUT ESOPHAGITIS: ICD-10-CM

## 2017-08-10 DIAGNOSIS — Z01.818 PREOP GENERAL PHYSICAL EXAM: Primary | ICD-10-CM

## 2017-08-10 DIAGNOSIS — Z09 SURGERY FOLLOW-UP EXAMINATION: ICD-10-CM

## 2017-08-10 DIAGNOSIS — Z98.84 S/P GASTRIC BYPASS: ICD-10-CM

## 2017-08-10 PROCEDURE — 99214 OFFICE O/P EST MOD 30 MIN: CPT | Performed by: PHYSICIAN ASSISTANT

## 2017-08-10 ASSESSMENT — PAIN SCALES - GENERAL: PAINLEVEL: NO PAIN (0)

## 2017-08-10 NOTE — NURSING NOTE
"Chief Complaint   Patient presents with     Pre-Op Exam       Initial /70 (BP Location: Right arm, Patient Position: Chair, Cuff Size: Adult Large)  Pulse 72  Temp 96.6  F (35.9  C) (Oral)  Resp 16  Ht 5' 5\" (1.651 m)  Wt 154 lb 8 oz (70.1 kg)  LMP 09/01/2008  BMI 25.71 kg/m2 Estimated body mass index is 25.71 kg/(m^2) as calculated from the following:    Height as of this encounter: 5' 5\" (1.651 m).    Weight as of this encounter: 154 lb 8 oz (70.1 kg).  Medication Reconciliation: complete       Tasneem MARINO LPN      "

## 2017-08-10 NOTE — MR AVS SNAPSHOT
After Visit Summary   8/10/2017    Lida Blood    MRN: [id number]           Patient Information     Date Of Birth          1985        Visit Information        Provider Department      8/10/2017 9:00 AM Moose Travis PA-C [facility]        Today's Diagnoses     Preop general physical exam    -  1      Care Instructions      Before Your Surgery      Call your surgeon if there is any change in your health. This includes signs of a cold or flu (such as a sore throat, runny nose, cough, rash or fever).    Do not smoke, drink alcohol or take over the counter medicine (unless your surgeon or primary care doctor tells you to) for the 24 hours before and after surgery.    If you take prescribed drugs: Follow your doctor s orders about which medicines to take and which to stop until after surgery.    Eating and drinking prior to surgery: follow the instructions from your surgeon    Take a shower or bath the night before surgery. Use the soap your surgeon gave you to gently clean your skin. If you do not have soap from your surgeon, use your regular soap. Do not shave or scrub the surgery site.  Wear clean pajamas and have clean sheets on your bed.           Follow-ups after your visit        Who to contact     If you have questions or need follow up information about today's clinic visit or your schedule please contact [facility] directly at [phone].  Normal or non-critical lab and imaging results will be communicated to you by MyChart, letter or phone within 4 business days after the clinic has received the results. If you do not hear from us within 7 days, please contact the clinic through MyChart or phone. If you have a critical or abnormal lab result, we will notify you by phone as soon as possible.  Submit refill requests through [employer] or call your pharmacy and they will forward the refill request to us. Please allow 3 business days for your refill to be  "completed.          Additional Information About Your Visit        [employer] Information     [employer] gives you secure access to your electronic health record. If you see a primary care provider, you can also send messages to your care team and make appointments. If you have questions, please call your primary care clinic.  If you do not have a primary care provider, please call [phone] and they will assist you.        Care EveryWhere ID     This is your Care EveryWhere ID. This could be used by other organizations to access your [place] medical records  [id number]        Your Vitals Were     Pulse Temperature Respirations Height Last Period BMI (Body Mass Index)    72 96.6  F (35.9  C) (Oral) 16 5' 5\" (1.651 m) 09/01/2008 25.71 kg/m2       Blood Pressure from Last 3 Encounters:   08/10/17 118/70   07/19/17 120/72   07/17/17 123/81    Weight from Last 3 Encounters:   08/10/17 154 lb 8 oz (70.1 kg)   07/19/17 155 lb (70.3 kg)   07/17/17 157 lb (71.2 kg)              Today, you had the following     No orders found for display       Primary Care Provider Office Phone # Fax #    Joaquin Dudley [phone]       [address] [facility] [address]        Equal Access to Services     KAYDEN MORA : Hadii hang ku hadasho Soomaali, waaxda luqadaha, qaybta kaalmada adeegyada, garcía mckeon hayjessen moshe cee . So [facility] [phone].    ATENCIÓN: Si habla español, tiene a goldsmith disposición servicios gratuitos de asistencia lingüística. Llame al [phone].    We comply with applicable federal civil rights laws and [place] laws. We do not discriminate on the basis of race, color, national origin, age, disability sex, sexual orientation or gender identity.            Thank you!     Thank you for choosing [facility]  for your care. Our goal is always to provide you with excellent care. Hearing back from our patients is one way we can continue to improve our services. Please take a few " minutes to complete the written survey that you may receive in the mail after your visit with us. Thank you!             Your Updated Medication List - Protect others around you: Learn how to safely use, store and throw away your medicines at www.disposemymeds.org.          This list is accurate as of: 8/10/17  9:19 AM.  Always use your most recent med list.                   Brand Name Dispense Instructions for use Diagnosis    CALCIUM PO      Take by mouth daily        escitalopram 20 MG tablet    LEXAPRO    90 tablet    Take 1 tablet (20 mg) by mouth daily    Major depressive disorder, single episode, moderate (H)       estradiol 0.075 MG/24HR Ptwk     12 patch    Place 1 patch onto the skin once a week    Symptomatic menopausal or female climacteric states       * levothyroxine 75 MCG tablet    SYNTHROID/LEVOTHROID    90 tablet    Take 1 tablet (75 mcg) by mouth daily Take along with 200 mcg tablet daily.    Acquired hypothyroidism       * levothyroxine 200 MCG tablet    SYNTHROID/LEVOTHROID    30 tablet    Take 1 tablet (200 mcg) by mouth daily    Acquired hypothyroidism       OMEPRAZOLE PO      Take 40 mg by mouth 2 times daily (before meals)        VITAMIN D PO      Take by mouth daily        * Notice:  This list has 2 medication(s) that are the same as other medications prescribed for you. Read the directions carefully, and ask your doctor or other care provider to review them with you.

## 2017-08-10 NOTE — PROGRESS NOTES
[place] [address] [facility] [address]  [phone]  Dept: [phone]    PRE-OP EVALUATION:  Today's date: 8/10/2017    Lida Blood (: 1985) presents for pre-operative evaluation assessment as requested by Dr. Sraah.  She requires evaluation and anesthesia risk assessment prior to undergoing surgery/procedure for treatment of Stomach problems .  Proposed procedure: EGD with Bravo Placement    Date of Surgery/ Procedure: 2017  Time of Surgery/ Procedure: to be determined  Hospital/Surgical Facility: [facility]  Fax number for surgical facility: [phone]  Primary Physician: Joaquin Dudley  Type of Anesthesia Anticipated: General    Patient has a Health Care Directive or Living Will:  NO    1. NO - Do you have a history of heart attack, stroke, stent, bypass or surgery on an artery in the head, neck, heart or legs?  2. NO - Do you ever have any pain or discomfort in your chest?  3. NO - Do you have a history of  Heart Failure?  4. NO - Are you troubled by shortness of breath when: walking on the level, up a slight hill or at night?  5. NO - Do you currently have a cold, bronchitis or other respiratory infection?  6. NO - Do you have a cough, shortness of breath or wheezing?  7. NO - Do you sometimes get pains in the calves of your legs when you walk?  8. NO - Do you or anyone in your family have previous history of blood clots?  9. NO - Do you or does anyone in your family have a serious bleeding problem such as prolonged bleeding following surgeries or cuts?  10. NO - Have you ever had problems with anemia or been told to take iron pills?  11. YES - HAVE YOU HAD ANY ABNORMAL BLOOD LOSS SUCH AS BLACK, TARRY OR BLOODY STOOLS, OR ABNORMAL VAGINAL BLEEDING? GI related  12. NO - Have you ever had a blood transfusion?  13. NO - Have you or any of your relatives ever had problems with anesthesia?  14. NO - Do you have sleep apnea, excessive  snoring or daytime drowsiness?  15. NO - Do you have any prosthetic heart valves?  16. NO - Do you have prosthetic joints?  17. NO - Is there any chance that you may be pregnant?        HPI:                                                      Brief HPI related to upcoming procedure: ongoing GI related issues to gastric bypass surgery.      See problem list for active medical problems.  Problems all longstanding and stable, except as noted/documented.  See ROS for pertinent symptoms related to these conditions.                                                                                                  .    MEDICAL HISTORY:                                                    Patient Active Problem List    Diagnosis Date Noted     Abdominal pain 05/24/2013     Priority: High     Vomiting and diarrhea 05/23/2013     Priority: High     Abdominal pain, acute, epigastric 04/23/2012     Priority: High     LUQ abdominal pain 08/16/2016     Priority: Medium     Ileus (H) 08/16/2016     Priority: Medium     Dehydration 03/31/2016     Priority: Medium     Obesity 03/15/2016     Priority: Medium     Gastric ulcer 12/02/2015     Priority: Medium     Hypokalemia 12/01/2015     Priority: Medium     Hypoglycemia 12/01/2015     Priority: Medium     Gastroesophageal reflux disease without esophagitis 10/16/2015     Priority: Medium     RLS (restless legs syndrome) 04/05/2013     Priority: Medium     Migraine headache 10/14/2011     Priority: Medium     Physiological ovarian cysts 04/28/2011     Priority: Medium     CARDIOVASCULAR SCREENING; LDL GOAL LESS THAN 160 10/31/2010     Priority: Medium     Vaginal discharge 07/15/2009     Priority: Medium     Surgery follow-up examination 07/15/2009     Priority: Medium     Endometriosis 10/24/2008     Priority: Medium     Major depressive disorder, single episode, moderate (H)      Priority: Medium     Backache 01/22/2004     Priority: Medium     Problem list name updated by automated  process. Provider to review       Hypothyroidism 09/26/2003     Priority: Medium     Problem list name updated by automated process. Provider to review       Anxiety state 05/08/2002     Priority: Medium     Problem list name updated by automated process. Provider to review        Past Medical History:   Diagnosis Date     Endometriosis of pelvic peritoneum 10/06/08     Endometriosis of uterus 10/06/08    Admit. Discharged 10/07/08     Exophthalm ophthalmopleg      Generalized anxiety disorder      Iodine hypothyroidism 9/2003    s/p radioactive iodine ablation Rx     Major depressive disorder, single episode, moderate (H)      Migraine headache 10/14/2011     Obesity (BMI 30-39.9)      Other and unspecified ovarian cyst 03/02/10    d/c 03/04/10     Pain pelvic 12/14/04    Discharged 12/17/04-[facility]     PONV (postoperative nausea and vomiting)      S/P hysterectomy      Thyrotoxicosis without mention of goiter or other cause, without mention of thyrotoxic crisis or storm 2001     trachelectomy 05/19/09     Past Surgical History:   Procedure Laterality Date     C APPENDECTOMY  4/18/2003     C REMOVAL OF CERVIX  05/29/09    laparoscopic assisted vaginal trachelectomy     C SUPRACERV ABD HYSTERECTOMY  10/06/08     COLONOSCOPY  11/25/14     ESOPHAGOSCOPY, GASTROSCOPY, DUODENOSCOPY (EGD), COMBINED N/A 8/31/2015    Procedure: COMBINED ESOPHAGOSCOPY, GASTROSCOPY, DUODENOSCOPY (EGD), BIOPSY SINGLE OR MULTIPLE;  Surgeon: Toni Wiggins MD;  Location:  GI     ESOPHAGOSCOPY, GASTROSCOPY, DUODENOSCOPY (EGD), COMBINED N/A 8/18/2016    Procedure: COMBINED ESOPHAGOSCOPY, GASTROSCOPY, DUODENOSCOPY (EGD);  Surgeon: Jaswinder Waddell MD;  Location:  GI     ESOPHAGOSCOPY, GASTROSCOPY, DUODENOSCOPY (EGD), COMBINED N/A 3/1/2017    Procedure: COMBINED ESOPHAGOSCOPY, GASTROSCOPY, DUODENOSCOPY (EGD), BIOPSY SINGLE OR MULTIPLE;  Surgeon: Jan Johnston MD;  Location:  GI     ESOPHAGOSCOPY, GASTROSCOPY,  DUODENOSCOPY (EGD), DILATATION, COMBINED N/A 4/13/2016    Procedure: COMBINED ESOPHAGOSCOPY, GASTROSCOPY, DUODENOSCOPY (EGD), DILATATION;  Surgeon: Jos Dinero MD;  Location:  GI     EYE SURGERY      bilat orbital decompression surgery      HC COLONOSCOPY W BIOPSY  1/22/2007     HC COLONOSCOPY W/WO BRUSH/WASH  12/16/04     HC CREATE EARDRUM OPENING,GEN ANESTH  12/29/88    Bilateral myringotomy with insertion of PE tubes     HC CREATE EARDRUM OPENING,GEN ANESTH  12/20/90    Bilateral myringotomy with insertion of PE tubes     HC LAP,FULGURATE/EXCISE LESIONS  06/16/08    Laparoscopy, ablation of the endometriosis and diagnostic cystoscopy     HC LAPAROSCOPY, SURGICAL, ABDOMEN, PERITONEUM & OMENTUM; DX W/ OR W/O SPECIMEN(S)  04/18/2003    Diagnostic laparoscopy     HC REMOVAL GALLBLADDER  8/1/03     HC TYMPANOPLASTY W/O MASTOID, INIT/REV W/O OSS CHAIN RECONST  10/03/00    Left exploratory tympanoplasty, myringoplasty, fascia grafting of the oval window, microdissection via the use of operative microscope      UGI ENDOSCOPY, SIMPLE EXAM  3/23/12, 11/13/14     HERNIA REPAIR, INCISIONAL  08/23/2006    Laparoscopic mesh repair.     HYSTERECTOMY, PAP NO LONGER INDICATED  5/29/09     HYSTEROSCOPY  02/08/08     LAPAROSCOPIC GASTRECTOMY N/A 3/15/2016    Procedure: LAPAROSCOPIC GASTRECTOMY;  Surgeon: Jos Dinero MD;  Location:  OR     LAPAROSCOPIC LYSIS ADHESIONS  11/02/10    [facility]     LAPAROSCOPIC REVISION GASTROPLASTY TO ELBERT-EN-Y N/A 10/26/2015    Procedure: LAPAROSCOPIC REVISION GASTROPLASTY TO ELBERT-EN-Y;  Surgeon: Toni Wiggins MD;  Location:  OR     LAPAROSCOPIC SALPINGO-OOPHORECTOMY Left 3/2015     LAPAROSCOPY DIAGNOSTIC (GENERAL) Right 06/23/11    Evaluation of abdomen/pelvis, RSO, Cysto; [facility]     Current Outpatient Prescriptions   Medication Sig Dispense Refill     OMEPRAZOLE PO Take 40 mg by mouth 2 times daily (before meals)       levothyroxine (SYNTHROID,  "LEVOTHROID) 200 MCG tablet Take 1 tablet (200 mcg) by mouth daily 30 tablet 1     estradiol 0.075 MG/24HR PTWK Place 1 patch onto the skin once a week 12 patch 3     levothyroxine (SYNTHROID, LEVOTHROID) 75 MCG tablet Take 1 tablet (75 mcg) by mouth daily Take along with 200 mcg tablet daily. 90 tablet 1     CALCIUM PO Take by mouth daily       Cholecalciferol (VITAMIN D PO) Take by mouth daily       escitalopram (LEXAPRO) 20 MG tablet Take 1 tablet (20 mg) by mouth daily 90 tablet 1     OTC products: None, except as noted above    Allergies   Allergen Reactions     No Known Drug Allergies       Latex Allergy: NO    Social History   Substance Use Topics     Smoking status: Former Smoker     Packs/day: 0.25     Years: 6.00     Types: Cigarettes     Quit date: 6/16/2015     Smokeless tobacco: Never Used      Comment: social     Alcohol use Yes      Comment: rare     History   Drug Use No       REVIEW OF SYSTEMS:                                                    C: NEGATIVE for fever, chills, change in weight  I: NEGATIVE for worrisome rashes, moles or lesions  E: NEGATIVE for vision changes or irritation  E/M: NEGATIVE for ear, mouth and throat problems  R: NEGATIVE for significant cough or SOB  B: NEGATIVE for masses, tenderness or discharge  CV: NEGATIVE for chest pain, palpitations or peripheral edema  GI: NEGATIVE for nausea, abdominal pain, heartburn, or change in bowel habits  : NEGATIVE for frequency, dysuria, or hematuria  M: NEGATIVE for significant arthralgias or myalgia  N: NEGATIVE for weakness, dizziness or paresthesias  E: NEGATIVE for temperature intolerance, skin/hair changes  H: NEGATIVE for bleeding problems  P: NEGATIVE for changes in mood or affect    EXAM:                                                    /70 (BP Location: Right arm, Patient Position: Chair, Cuff Size: Adult Large)  Pulse 72  Temp 96.6  F (35.9  C) (Oral)  Resp 16  Ht 5' 5\" (1.651 m)  Wt 154 lb 8 oz (70.1 kg)  LMP " 09/01/2008  BMI 25.71 kg/m2    GENERAL APPEARANCE: healthy, alert and no distress     HENT: ear canals and TM's normal and nose and mouth without ulcers or lesions     NECK: no adenopathy, no asymmetry, masses, or scars and thyroid normal to palpation     RESP: lungs clear to auscultation - no rales, rhonchi or wheezes     CV: regular rates and rhythm, normal S1 S2, no S3 or S4 and no murmur, click or rub     ABDOMEN:  soft, nontender, no HSM or masses and bowel sounds normal     MS: extremities normal- no gross deformities noted, no evidence of inflammation in joints, FROM in all extremities.     SKIN: no suspicious lesions or rashes     NEURO: Normal strength and tone, sensory exam grossly normal, mentation intact and speech normal     PSYCH: mentation appears normal. and affect normal/bright    DIAGNOSTICS:                                                    No labs or EKG required for low risk surgery (cataract, skin procedure, breast biopsy, etc)    Recent Labs   Lab Test  07/17/17   1225  03/27/17   0707  03/27/17   0613   03/29/16   1352  12/10/13   HGB  12.4  10.5*  Unsatisfactory specimen - clotted  JOHN PADRON,0637 03/27/17 EAB     < >  12.6   < >   --    PLT  276  217  Unsatisfactory specimen - clotted  JOHN PADRON,0637 03/27/17 EAB     < >  436   < >   --    INR   --    --   0.98   --   0.95   < >   --    NA  141   --   138   < >  140   < >   --    POTASSIUM  4.0   --   4.6   < >  3.9   < >   --    CR  0.84   --   0.92   < >  0.69   < >   --    A1C   --    --    --    --    --    --   5.2    < > = values in this interval not displayed.        IMPRESSION:                                                    Reason for surgery/procedure: need for upper GI endoscopy for surveillance   Diagnosis/reason for consult: anesthesia / surgical clearance    The proposed surgical procedure is considered INTERMEDIATE risk.    REVISED CARDIAC RISK INDEX  The patient has the following serious cardiovascular risks for  perioperative complications such as (MI, PE, VFib and 3  AV Block):  No serious cardiac risks  INTERPRETATION: 1 risks: Class II (low risk - 0.9% complication rate)    The patient has the following additional risks for perioperative complications:  No identified additional risks  The ASCVD Risk score (Edgemont BALA Jr, et al., 2013) failed to calculate for the following reasons:    The 2013 ASCVD risk score is only valid for ages 40 to 79      ICD-10-CM    1. Preop general physical exam Z01.818    2. Surgery follow-up examination Z09    3. Gastroesophageal reflux disease without esophagitis K21.9    4. S/P gastric bypass Z98.84        RECOMMENDATIONS:                                                      --Consult hospital rounder / IM to assist post-op medical management    --Patient is to take all scheduled medications on the day of surgery EXCEPT for modifications listed below.    APPROVAL GIVEN to proceed with proposed procedure, without further diagnostic evaluation       Signed Electronically by: Moose Healy PA-C    Copy of this evaluation report is provided to requesting physician.    [facility] Preop Guidelines

## 2017-08-17 ENCOUNTER — TRANSFERRED RECORDS (OUTPATIENT)
Dept: HEALTH INFORMATION MANAGEMENT | Facility: CLINIC | Age: 32
End: 2017-08-17

## 2017-09-28 ENCOUNTER — TRANSFERRED RECORDS (OUTPATIENT)
Dept: HEALTH INFORMATION MANAGEMENT | Facility: CLINIC | Age: 32
End: 2017-09-28

## 2017-10-03 NOTE — ADDENDUM NOTE
Encounter addended by: Nessa Madden, PT on: 10/3/2017  8:58 AM<BR>     Actions taken: Episode resolved

## 2017-10-19 ENCOUNTER — HOSPITAL ENCOUNTER (OUTPATIENT)
Dept: CT IMAGING | Facility: CLINIC | Age: 32
Discharge: HOME | End: 2017-10-19
Attending: SURGERY | Admitting: SURGERY
Payer: COMMERCIAL

## 2017-10-19 PROCEDURE — 25000128 H RX IP 250 OP 636: Performed by: RADIOLOGY

## 2017-10-19 PROCEDURE — 25000125 ZZHC RX 250: Performed by: RADIOLOGY

## 2017-10-19 PROCEDURE — 74177 CT ABD & PELVIS W/CONTRAST: CPT

## 2017-10-19 RX ORDER — IOPAMIDOL 755 MG/ML
100 INJECTION, SOLUTION INTRAVASCULAR ONCE
Status: COMPLETED | OUTPATIENT
Start: 2017-10-19 | End: 2017-10-19

## 2017-10-19 RX ADMIN — SODIUM CHLORIDE 60 ML: 9 INJECTION, SOLUTION INTRAVENOUS at 15:35

## 2017-10-19 RX ADMIN — IOPAMIDOL 75 ML: 755 INJECTION, SOLUTION INTRAVENOUS at 15:36

## 2017-10-31 ENCOUNTER — HOSPITAL ENCOUNTER (OUTPATIENT)
Dept: MAMMOGRAPHY | Facility: CLINIC | Age: 32
Discharge: HOME | End: 2017-10-31
Attending: FAMILY MEDICINE | Admitting: FAMILY MEDICINE
Payer: COMMERCIAL

## 2017-10-31 ENCOUNTER — HOSPITAL ENCOUNTER (OUTPATIENT)
Dept: ULTRASOUND IMAGING | Facility: CLINIC | Age: 32
End: 2017-10-31
Attending: FAMILY MEDICINE
Payer: COMMERCIAL

## 2017-10-31 DIAGNOSIS — N64.4 MASTODYNIA: ICD-10-CM

## 2017-10-31 PROCEDURE — G0204 DX MAMMO INCL CAD BI: HCPCS

## 2017-10-31 PROCEDURE — 76642 ULTRASOUND BREAST LIMITED: CPT | Mod: LT

## 2017-11-02 ENCOUNTER — RADIANT APPOINTMENT (OUTPATIENT)
Dept: GENERAL RADIOLOGY | Facility: CLINIC | Age: 32
End: 2017-11-02
Attending: PODIATRIST
Payer: COMMERCIAL

## 2017-11-02 ENCOUNTER — OFFICE VISIT (OUTPATIENT)
Dept: PODIATRY | Facility: CLINIC | Age: 32
End: 2017-11-02
Payer: COMMERCIAL

## 2017-11-02 VITALS — WEIGHT: 156 LBS | TEMPERATURE: 97.8 F | HEIGHT: 65 IN | BODY MASS INDEX: 25.99 KG/M2

## 2017-11-02 DIAGNOSIS — M76.71 PERONEAL TENDONITIS OF RIGHT LOWER EXTREMITY: ICD-10-CM

## 2017-11-02 DIAGNOSIS — Z98.84 HISTORY OF GASTRIC BYPASS: ICD-10-CM

## 2017-11-02 DIAGNOSIS — M25.371 RIGHT ANKLE INSTABILITY: ICD-10-CM

## 2017-11-02 DIAGNOSIS — M25.371 RIGHT ANKLE INSTABILITY: Primary | ICD-10-CM

## 2017-11-02 PROCEDURE — 73630 X-RAY EXAM OF FOOT: CPT | Mod: TC

## 2017-11-02 PROCEDURE — 73600 X-RAY EXAM OF ANKLE: CPT | Mod: TC

## 2017-11-02 PROCEDURE — 99213 OFFICE O/P EST LOW 20 MIN: CPT | Performed by: PODIATRIST

## 2017-11-02 ASSESSMENT — PAIN SCALES - GENERAL: PAINLEVEL: MILD PAIN (3)

## 2017-11-02 NOTE — PATIENT INSTRUCTIONS
Please call [phone] to schedule imaging that was ordered by Dr. Martinez today.  You will need to schedule a 30 minute follow up appointment with Dr. Martinez a few days following your imaging to discuss results and plan of care by calling [phone].

## 2017-11-02 NOTE — NURSING NOTE
"Chief Complaint   Patient presents with     RECHECK     still having pain 2-7 and ankle swelling, utilizing ace wrap and NS for 2 1/2 weeks, at home PT w/bands, orthotics, lateral Right ankle instability and peroneal tendonitis, onset Fall 2016; XR Right foot/ankle today, 11/2/2017; LOV 5/24/2017       Initial Temp 97.8  F (36.6  C) (Temporal)  Ht 5' 5\" (1.651 m)  Wt 156 lb (70.8 kg)  LMP 09/01/2008  BMI 25.96 kg/m2 Estimated body mass index is 25.96 kg/(m^2) as calculated from the following:    Height as of this encounter: 5' 5\" (1.651 m).    Weight as of this encounter: 156 lb (70.8 kg).  BP completed using cuff size: NA (Not Taken)  Medication Reconciliation: complete    Adeline Schmid CMA, November 2, 2017    "

## 2017-11-02 NOTE — MR AVS SNAPSHOT
After Visit Summary   11/2/2017    Lida Blood    MRN: [id number]           Patient Information     Date Of Birth          1985        Visit Information        Provider Department      11/2/2017 10:45 AM Delgado Martinez DPM [facility]        Today's Diagnoses     Right ankle instability    -  1    Peroneal tendonitis of right lower extremity          Care Instructions    Please call [phone] to schedule imaging that was ordered by Dr. Martinez today.  You will need to schedule a 30 minute follow up appointment with Dr. Martinez a few days following your imaging to discuss results and plan of care by calling [phone].          Follow-ups after your visit        Who to contact     If you have questions or need follow up information about today's clinic visit or your schedule please contact [facility] directly at [phone].  Normal or non-critical lab and imaging results will be communicated to you by MyChart, letter or phone within 4 business days after the clinic has received the results. If you do not hear from us within 7 days, please contact the clinic through [facility] or phone. If you have a critical or abnormal lab result, we will notify you by phone as soon as possible.  Submit refill requests through [employer] or call your pharmacy and they will forward the refill request to us. Please allow 3 business days for your refill to be completed.          Additional Information About Your Visit        MyChart Information     [employer] gives you secure access to your electronic health record. If you see a primary care provider, you can also send messages to your care team and make appointments. If you have questions, please call your primary care clinic.  If you do not have a primary care provider, please call [phone] and they will assist you.        Care EveryWhere ID     This is your Care EveryWhere ID. This could be used by other organizations  "to access your [facility] medical records  [id number]        Your Vitals Were     Temperature Height Last Period BMI (Body Mass Index)          97.8  F (36.6  C) (Temporal) 5' 5\" (1.651 m) 09/01/2008 25.96 kg/m2         Blood Pressure from Last 3 Encounters:   08/10/17 118/70   07/19/17 120/72   07/17/17 123/81    Weight from Last 3 Encounters:   11/02/17 156 lb (70.8 kg)   08/10/17 154 lb 8 oz (70.1 kg)   07/19/17 155 lb (70.3 kg)               Primary Care Provider Office Phone # Fax #    Joaquin Dudley [phone]       [address] [facility] [address]        Equal Access to Services     KAYDEN MORA : Dayana borden Socindi, waaxda luqadaha, qaybta kaalmada ademaria guadalupeyamei, garcía cee . So [facility] [phone].    ATENCIÓN: Si habla español, tiene a goldsmith disposición servicios gratuitos de asistencia lingüística. Llame al [phone].    We comply with applicable federal civil rights laws and [place] laws. We do not discriminate on the basis of race, color, national origin, age, disability, sex, sexual orientation, or gender identity.            Thank you!     Thank you for choosing [facility]  for your care. Our goal is always to provide you with excellent care. Hearing back from our patients is one way we can continue to improve our services. Please take a few minutes to complete the written survey that you may receive in the mail after your visit with us. Thank you!             Your Updated Medication List - Protect others around you: Learn how to safely use, store and throw away your medicines at www.disposemymeds.org.          This list is accurate as of: 11/2/17 11:43 AM.  Always use your most recent med list.                   Brand Name Dispense Instructions for use Diagnosis    CALCIUM PO      Take by mouth daily        escitalopram 20 MG tablet    LEXAPRO    90 tablet    Take 1 tablet (20 mg) by mouth daily    Major depressive disorder, single " episode, moderate (H)       estradiol 0.075 MG/24HR Ptwk     12 patch    Place 1 patch onto the skin once a week    Symptomatic menopausal or female climacteric states       * levothyroxine 75 MCG tablet    SYNTHROID/LEVOTHROID    90 tablet    Take 1 tablet (75 mcg) by mouth daily Take along with 200 mcg tablet daily.    Acquired hypothyroidism       * levothyroxine 200 MCG tablet    SYNTHROID/LEVOTHROID    30 tablet    Take 1 tablet (200 mcg) by mouth daily    Acquired hypothyroidism       OMEPRAZOLE PO      Take 40 mg by mouth 2 times daily (before meals)        VITAMIN D PO      Take by mouth daily        * Notice:  This list has 2 medication(s) that are the same as other medications prescribed for you. Read the directions carefully, and ask your doctor or other care provider to review them with you.

## 2017-11-02 NOTE — PROGRESS NOTES
Chief Complaint   Patient presents with     RECHECK     still having pain 2-7 and ankle swelling, utilizing ace wrap and NS for 2 1/2 weeks, at home PT w/bands, orthotics, lateral Right ankle instability and peroneal tendonitis, onset Fall 2016; XR Right foot/ankle today, 11/2/2017; LOV 5/24/2017       Weight management plan: Patient was referred to their PCP to discuss a diet and exercise plan.     HPI:  Lida Blood is a 31 year old female who is seen in consultation at the request of self.    Pt presents for eval of:   (Onset, Location, L/R, Character, Treatments, Injury if yes)     Onset Fall 2016, started out as intermittent lateral Right ankle pain now is more constant  Dull ache, radiates up side of lower leg, burning, pain feels like it moves, swelling, pain 4. History of previous right ankle sprain in high school as well.  Supportive shoes, ice    Works at [employer] OR [employer].     ROS:  10 point ROS neg other than the symptoms noted above in the HPI.    PAST MEDICAL HISTORY:   Past Medical History:   Diagnosis Date     Endometriosis of pelvic peritoneum 10/06/08     Endometriosis of uterus 10/06/08    Admit. 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    s/p radioactive iodine ablation Rx     Major depressive disorder, single episode, moderate (H)      Migraine headache 10/14/2011     Obesity (BMI 30-39.9)      Other and unspecified ovarian cyst 03/02/10    d/c 03/04/10     Pain pelvic 12/14/04    Discharged 12/17/04-[facility]     PONV (postoperative nausea and vomiting)      S/P hysterectomy      Thyrotoxicosis without mention of goiter or other cause, without mention of thyrotoxic crisis or storm 2001     trachelectomy 05/19/09        PAST SURGICAL HISTORY:   Past Surgical History:   Procedure Laterality Date     C APPENDECTOMY  4/18/2003     C REMOVAL OF CERVIX  05/29/09    laparoscopic  assisted vaginal trachelectomy     C SUPRACERV ABD HYSTERECTOMY  10/06/08     COLONOSCOPY  11/25/14     ESOPHAGOSCOPY, GASTROSCOPY, DUODENOSCOPY (EGD), COMBINED N/A 8/31/2015    Procedure: COMBINED ESOPHAGOSCOPY, GASTROSCOPY, DUODENOSCOPY (EGD), BIOPSY SINGLE OR MULTIPLE;  Surgeon: Toni Wiggins MD;  Location:  GI     ESOPHAGOSCOPY, GASTROSCOPY, DUODENOSCOPY (EGD), COMBINED N/A 8/18/2016    Procedure: COMBINED ESOPHAGOSCOPY, GASTROSCOPY, DUODENOSCOPY (EGD);  Surgeon: Jaswinder Waddell MD;  Location:  GI     ESOPHAGOSCOPY, GASTROSCOPY, DUODENOSCOPY (EGD), COMBINED N/A 3/1/2017    Procedure: COMBINED ESOPHAGOSCOPY, GASTROSCOPY, DUODENOSCOPY (EGD), BIOPSY SINGLE OR MULTIPLE;  Surgeon: Jan Johnston MD;  [facility]: [facility]     ESOPHAGOSCOPY, GASTROSCOPY, DUODENOSCOPY (EGD), DILATATION, COMBINED N/A 4/13/2016    Procedure: COMBINED ESOPHAGOSCOPY, GASTROSCOPY, DUODENOSCOPY (EGD), DILATATION;  Surgeon: Jos Dinero MD;  Location:  GI     EYE SURGERY      bilat orbital decompression surgery      HC COLONOSCOPY W BIOPSY  1/22/2007     HC COLONOSCOPY W/WO BRUSH/WASH  12/16/04     HC CREATE EARDRUM OPENING,GEN ANESTH  12/29/88    Bilateral myringotomy with insertion of PE tubes     HC CREATE EARDRUM OPENING,GEN ANESTH  12/20/90    Bilateral myringotomy with insertion of PE tubes     HC LAP,FULGURATE/EXCISE LESIONS  06/16/08    Laparoscopy, ablation of the endometriosis and diagnostic cystoscopy     HC LAPAROSCOPY, SURGICAL, ABDOMEN, PERITONEUM & OMENTUM; DX W/ OR W/O SPECIMEN(S)  04/18/2003    Diagnostic laparoscopy     HC REMOVAL GALLBLADDER  8/1/03     HC TYMPANOPLASTY W/O MASTOID, INIT/REV W/O OSS CHAIN RECONST  10/03/00    Left exploratory tympanoplasty, myringoplasty, fascia grafting of the oval window, microdissection via the use of operative microscope     HC UGI ENDOSCOPY, SIMPLE EXAM  3/23/12, 11/13/14     HERNIA REPAIR, INCISIONAL  08/23/2006    Laparoscopic mesh repair.      HYSTERECTOMY, PAP NO LONGER INDICATED  5/29/09     HYSTEROSCOPY  02/08/08     LAPAROSCOPIC GASTRECTOMY N/A 3/15/2016    Procedure: LAPAROSCOPIC GASTRECTOMY;  Surgeon: Jos Dinero MD;  [facility]     LAPAROSCOPIC LYSIS ADHESIONS  11/02/10    [facility]     LAPAROSCOPIC REVISION GASTROPLASTY TO ELBERT-EN-Y N/A 10/26/2015    Procedure: LAPAROSCOPIC REVISION GASTROPLASTY TO ELBERT-EN-Y;  Surgeon: Toni Wiggins MD;  Location[facility]     LAPAROSCOPIC SALPINGO-OOPHORECTOMY Left 3/2015     LAPAROSCOPY DIAGNOSTIC (GENERAL) Right 06/23/11    Evaluation of abdomen/pelvis, RSO, Cysto; [facility]        MEDICATIONS:   Current Outpatient Prescriptions:      OMEPRAZOLE PO, Take 40 mg by mouth 2 times daily (before meals), Disp: , Rfl:      levothyroxine (SYNTHROID, LEVOTHROID) 200 MCG tablet, Take 1 tablet (200 mcg) by mouth daily, Disp: 30 tablet, Rfl: 1     estradiol 0.075 MG/24HR PTWK, Place 1 patch onto the skin once a week, Disp: 12 patch, Rfl: 3     levothyroxine (SYNTHROID, LEVOTHROID) 75 MCG tablet, Take 1 tablet (75 mcg) by mouth daily Take along with 200 mcg tablet daily., Disp: 90 tablet, Rfl: 1     CALCIUM PO, Take by mouth daily, Disp: , Rfl:      Cholecalciferol (VITAMIN D PO), Take by mouth daily, Disp: , Rfl:      escitalopram (LEXAPRO) 20 MG tablet, Take 1 tablet (20 mg) by mouth daily, Disp: 90 tablet, Rfl: 1     ALLERGIES:    Allergies   Allergen Reactions     No Known Drug Allergies         SOCIAL HISTORY:   Social History     Social History     Marital status:      Spouse name: patric     Number of children: 01     Years of education: 12     Occupational History     specialist Other     [employer]     Social History Main Topics     Smoking status: Former Smoker     Packs/day: 0.25     Years: 6.00     Types: Cigarettes     Quit date: 6/16/2015     Smokeless tobacco: Never Used      Comment: social     Alcohol use Yes      Comment: rare     Drug use: No     Sexual activity:  "Yes     Partners: Male     Birth control/ protection: Surgical      Comment: hysterectomy 2008     Other Topics Concern      Service No     Blood Transfusions No     Caffeine Concern No     Occupational Exposure No     Hobby Hazards No     Sleep Concern No     Stress Concern Yes     Weight Concern Yes     she exercised all the time     Special Diet No     Back Care No     Exercise Yes     all the time     Bike Helmet No     Seat Belt Yes     Self-Exams No     Social History Narrative        FAMILY HISTORY:   Family History   Problem Relation Age of Onset     Breast Cancer Mother      age 42 on chemo and radiation     Gynecology Mother      endometriosis     DIABETES Maternal Grandmother      type 1     Thyroid Disease Maternal Grandmother      C.A.D. Maternal Grandmother      Cardiovascular Maternal Grandmother      HEART DISEASE Maternal Grandmother      HEART DISEASE Paternal Grandmother      DIABETES Maternal Grandfather      diabetes     Thyroid Disease Maternal Aunt      HEART DISEASE Maternal Aunt      great MGA     Gynecology Sister      endometriosis     Anesthesia Reaction No family hx of         EXAM:Vitals: Temp 97.8  F (36.6  C) (Temporal)  Ht 5' 5\" (1.651 m)  Wt 156 lb (70.8 kg)  LMP 09/01/2008  BMI 25.96 kg/m2  BMI= Body mass index is 25.96 kg/(m^2).    General appearance: Patient is alert and fully cooperative with history & exam.  No sign of distress is noted during the visit.     Psychiatric: Affect is pleasant & appropriate.  Patient appears motivated to improve health.     Respiratory: Breathing is regular & unlabored while sitting.     HEENT: Hearing is intact to spoken word.  Speech is clear.  No gross evidence of visual impairment that would impact ambulation.     Vascular: DP & PT pulses are intact & regular bilaterally.  No significant edema or varicosities noted.  CFT and skin temperature is normal to both lower extremities.     Neurologic: Lower extremity sensation is intact to " light touch.  No evidence of weakness or contracture in the lower extremities.  No evidence of neuropathy.    Dermatologic: Skin is intact to both lower extremities with adequate texture, turgor and tone about the integument.  No paronychia or evidence of soft tissue infection is noted.     Musculoskeletal: Patient is ambulatory without assistive device or brace.  No gross ankle deformity noted.  No foot or ankle joint effusion is noted.  Very positive anterior drawer noted about the right ankle but not on the left. There is crepitus within the peroneal tendons on the right foot with very minimal induration. Peroneal tendons are strong. She is able to perform a unilateral toe raise right and left foot however much diminished proprioception is noted with balance and unilateral toe raise on the right when compared to the left.  All pain is noted about the peroneal tendons distal fibula anterior and posterior as well as a little bit about the lateral ankle gutter but not about the anterior margin of the ankle.    Imaging: Radiographs of the right ankle demonstrate no joint space narrowing and no loose bodies or osteophytes noted. Rectus foot type noted. No joint diastases.     ASSESSMENT:       ICD-10-CM    1. Right ankle instability M25.371 XR Ankle Right 2 Views     XR Foot Right G/E 3 Views     MR Ankle Right w/o Contrast    weight bearing if patient tolerates pain   2. Peroneal tendonitis of right lower extremity M76.71 XR Ankle Right 2 Views     XR Foot Right G/E 3 Views     MR Ankle Right w/o Contrast   3. History of gastric bypass Z98.890      PLAN:  Reviewed patient's chart in [facility].      5/24/2017   Obtained and interpreted radiographs  This patient has ankle instability and has developed peroneal tendinitis tendinopathy to accommodate for a very positive anterior drawer unstable right ankle.   Have ordered physical therapy to improve proprioception  Recommend sturdy stable shoes written instructions  dispensed  Order for custom molded orthotics to provide some stability  May also consider an ankle brace for a month or 2 or until symptoms are improving then discontinue it  Also begin oral anti-inflammatory for month or 2 or until symptoms improve  After she is established this care if her symptoms continue we will image with x-ray of the foot and ankle and MRI of the right ankle.    Voltaren medication prescribed today is an NSAID.  If you had a recent ulcer you probably have been instructed to discontinue oral NSAIDs. Do not start this medication if you have been instructed to discontinue use of oral NSAIDs or few have an active or recently active gastric ulcer.    11/2/2017  She did not utilize the oral anti-inflammatories secondary to her gastric ulcer, second one.  Has done PT for several visits and she is doing them at home still.   She is able to perform unilateral toe raise but with challenges on the right  MRI right ankle ordered today  Follow-up after MRI      Delgado Martinez DPM

## 2017-11-02 NOTE — LETTER
11/2/2017         RE: Lida Blood  [id number] [facility] [id number]        Dear Colleague,    Thank you for referring your patient, Lida Blood, to the [facility]. Please see a copy of my visit note below.    Chief Complaint   Patient presents with     RECHECK     still having pain 2-7 and ankle swelling, utilizing ace wrap and NS for 2 1/2 weeks, at home PT w/bands, orthotics, lateral Right ankle instability and peroneal tendonitis, onset Fall 2016; XR Right foot/ankle today, 11/2/2017; LOV 5/24/2017       Weight management plan: Patient was referred to their PCP to discuss a diet and exercise plan.     HPI:  Lida Blood is a 31 year old female who is seen in consultation at the request of self.    Pt presents for eval of:   (Onset, Location, L/R, Character, Treatments, Injury if yes)     Onset Fall 2016, started out as intermittent lateral Right ankle pain now is more constant  Dull ache, radiates up side of lower leg, burning, pain feels like it moves, swelling, pain 4. History of previous right ankle sprain in high school as well.  Supportive shoes, ice    Works at [employer] [place] [employer].     ROS:  10 point ROS neg other than the symptoms noted above in the HPI.    PAST MEDICAL HISTORY:   Past Medical History:   Diagnosis Date     Endometriosis of pelvic peritoneum 10/06/08     Endometriosis of uterus 10/06/08    Admit. 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    s/p radioactive iodine ablation Rx     Major depressive disorder, single episode, moderate (H)      Migraine headache 10/14/2011     Obesity (BMI 30-39.9)      Other and unspecified ovarian cyst 03/02/10    d/c 03/04/10     Pain pelvic 12/14/04    Discharged 12/17/04-[facility]     PONV (postoperative nausea and vomiting)      S/P hysterectomy      Thyrotoxicosis without mention of goiter or  other cause, without mention of thyrotoxic crisis or storm 2001     trachelectomy 05/19/09        PAST SURGICAL HISTORY:   Past Surgical History:   Procedure Laterality Date     C APPENDECTOMY  4/18/2003     C REMOVAL OF CERVIX  05/29/09    laparoscopic assisted vaginal trachelectomy     C SUPRACERV ABD HYSTERECTOMY  10/06/08     COLONOSCOPY  11/25/14     ESOPHAGOSCOPY, GASTROSCOPY, DUODENOSCOPY (EGD), COMBINED N/A 8/31/2015    Procedure: COMBINED ESOPHAGOSCOPY, GASTROSCOPY, DUODENOSCOPY (EGD), BIOPSY SINGLE OR MULTIPLE;  Surgeon: Toni Wiggins MD;  [facility]     ESOPHAGOSCOPY, GASTROSCOPY, DUODENOSCOPY (EGD), COMBINED N/A 8/18/2016    Procedure: COMBINED ESOPHAGOSCOPY, GASTROSCOPY, DUODENOSCOPY (EGD);  Surgeon: Jaswinder Waddell MD;  Location:  GI     ESOPHAGOSCOPY, GASTROSCOPY, DUODENOSCOPY (EGD), COMBINED N/A 3/1/2017    Procedure: COMBINED ESOPHAGOSCOPY, GASTROSCOPY, DUODENOSCOPY (EGD), BIOPSY SINGLE OR MULTIPLE;  Surgeon: Jan Johnston MD;  Location: [facility]     ESOPHAGOSCOPY, GASTROSCOPY, DUODENOSCOPY (EGD), DILATATION, COMBINED N/A 4/13/2016    Procedure: COMBINED ESOPHAGOSCOPY, GASTROSCOPY, DUODENOSCOPY (EGD), DILATATION;  Surgeon: Jos Dinero MD;  Location:  GI     EYE SURGERY      bilat orbital decompression surgery      HC COLONOSCOPY W BIOPSY  1/22/2007     HC COLONOSCOPY W/WO BRUSH/WASH  12/16/04     HC CREATE EARDRUM OPENING,GEN ANESTH  12/29/88    Bilateral myringotomy with insertion of PE tubes     HC CREATE EARDRUM OPENING,GEN ANESTH  12/20/90    Bilateral myringotomy with insertion of PE tubes     HC LAP,FULGURATE/EXCISE LESIONS  06/16/08    Laparoscopy, ablation of the endometriosis and diagnostic cystoscopy     HC LAPAROSCOPY, SURGICAL, ABDOMEN, PERITONEUM & OMENTUM; DX W/ OR W/O SPECIMEN(S)  04/18/2003    Diagnostic laparoscopy     HC REMOVAL GALLBLADDER  8/1/03     HC TYMPANOPLASTY W/O MASTOID, INIT/REV W/O OSS CHAIN RECONST  10/03/00    Left exploratory  tympanoplasty, myringoplasty, fascia grafting of the oval window, microdissection via the use of operative microscope      UGI ENDOSCOPY, SIMPLE EXAM  3/23/12, 11/13/14     HERNIA REPAIR, INCISIONAL  08/23/2006    Laparoscopic mesh repair.     HYSTERECTOMY, PAP NO LONGER INDICATED  5/29/09     HYSTEROSCOPY  02/08/08     LAPAROSCOPIC GASTRECTOMY N/A 3/15/2016    Procedure: LAPAROSCOPIC GASTRECTOMY;  Surgeon: Jos Dinero MD;  Location: [facility]     LAPAROSCOPIC LYSIS ADHESIONS  11/02/10    [facility]     LAPAROSCOPIC REVISION GASTROPLASTY TO ELBERT-EN-Y N/A 10/26/2015    Procedure: LAPAROSCOPIC REVISION GASTROPLASTY TO ELBERT-EN-Y;  Surgeon: Toni Wiggins MD;  Location: [facility]     LAPAROSCOPIC SALPINGO-OOPHORECTOMY Left 3/2015     LAPAROSCOPY DIAGNOSTIC (GENERAL) Right 06/23/11    Evaluation of abdomen/pelvis, RSO, Cysto; [facility]        MEDICATIONS:   Current Outpatient Prescriptions:      OMEPRAZOLE PO, Take 40 mg by mouth 2 times daily (before meals), Disp: , Rfl:      levothyroxine (SYNTHROID, LEVOTHROID) 200 MCG tablet, Take 1 tablet (200 mcg) by mouth daily, Disp: 30 tablet, Rfl: 1     estradiol 0.075 MG/24HR PTWK, Place 1 patch onto the skin once a week, Disp: 12 patch, Rfl: 3     levothyroxine (SYNTHROID, LEVOTHROID) 75 MCG tablet, Take 1 tablet (75 mcg) by mouth daily Take along with 200 mcg tablet daily., Disp: 90 tablet, Rfl: 1     CALCIUM PO, Take by mouth daily, Disp: , Rfl:      Cholecalciferol (VITAMIN D PO), Take by mouth daily, Disp: , Rfl:      escitalopram (LEXAPRO) 20 MG tablet, Take 1 tablet (20 mg) by mouth daily, Disp: 90 tablet, Rfl: 1     ALLERGIES:    Allergies   Allergen Reactions     No Known Drug Allergies         SOCIAL HISTORY:   Social History     Social History     Marital status:      Spouse name: patric     Number of children: 01     Years of education: 12     Occupational History     specialist Other     [employer]     Social History Main Topics      "Smoking status: Former Smoker     Packs/day: 0.25     Years: 6.00     Types: Cigarettes     Quit date: 6/16/2015     Smokeless tobacco: Never Used      Comment: social     Alcohol use Yes      Comment: rare     Drug use: No     Sexual activity: Yes     Partners: Male     Birth control/ protection: Surgical      Comment: hysterectomy 2008     Other Topics Concern      Service No     Blood Transfusions No     Caffeine Concern No     Occupational Exposure No     Hobby Hazards No     Sleep Concern No     Stress Concern Yes     Weight Concern Yes     she exercised all the time     Special Diet No     Back Care No     Exercise Yes     all the time     Bike Helmet No     Seat Belt Yes     Self-Exams No     Social History Narrative        FAMILY HISTORY:   Family History   Problem Relation Age of Onset     Breast Cancer Mother      age 42 on chemo and radiation     Gynecology Mother      endometriosis     DIABETES Maternal Grandmother      type 1     Thyroid Disease Maternal Grandmother      C.A.D. Maternal Grandmother      Cardiovascular Maternal Grandmother      HEART DISEASE Maternal Grandmother      HEART DISEASE Paternal Grandmother      DIABETES Maternal Grandfather      diabetes     Thyroid Disease Maternal Aunt      HEART DISEASE Maternal Aunt      great MGA     Gynecology Sister      endometriosis     Anesthesia Reaction No family hx of         EXAM:Vitals: Temp 97.8  F (36.6  C) (Temporal)  Ht 5' 5\" (1.651 m)  Wt 156 lb (70.8 kg)  LMP 09/01/2008  BMI 25.96 kg/m2  BMI= Body mass index is 25.96 kg/(m^2).    General appearance: Patient is alert and fully cooperative with history & exam.  No sign of distress is noted during the visit.     Psychiatric: Affect is pleasant & appropriate.  Patient appears motivated to improve health.     Respiratory: Breathing is regular & unlabored while sitting.     HEENT: Hearing is intact to spoken word.  Speech is clear.  No gross evidence of visual impairment that would " impact ambulation.     Vascular: DP & PT pulses are intact & regular bilaterally.  No significant edema or varicosities noted.  CFT and skin temperature is normal to both lower extremities.     Neurologic: Lower extremity sensation is intact to light touch.  No evidence of weakness or contracture in the lower extremities.  No evidence of neuropathy.    Dermatologic: Skin is intact to both lower extremities with adequate texture, turgor and tone about the integument.  No paronychia or evidence of soft tissue infection is noted.     Musculoskeletal: Patient is ambulatory without assistive device or brace.  No gross ankle deformity noted.  No foot or ankle joint effusion is noted.  Very positive anterior drawer noted about the right ankle but not on the left. There is crepitus within the peroneal tendons on the right foot with very minimal induration. Peroneal tendons are strong. She is able to perform a unilateral toe raise right and left foot however much diminished proprioception is noted with balance and unilateral toe raise on the right when compared to the left.  All pain is noted about the peroneal tendons distal fibula anterior and posterior as well as a little bit about the lateral ankle gutter but not about the anterior margin of the ankle.    Imaging: Radiographs of the right ankle demonstrate no joint space narrowing and no loose bodies or osteophytes noted. Rectus foot type noted. No joint diastases.     ASSESSMENT:       ICD-10-CM    1. Right ankle instability M25.371 XR Ankle Right 2 Views     XR Foot Right G/E 3 Views     MR Ankle Right w/o Contrast    weight bearing if patient tolerates pain   2. Peroneal tendonitis of right lower extremity M76.71 XR Ankle Right 2 Views     XR Foot Right G/E 3 Views     MR Ankle Right w/o Contrast   3. History of gastric bypass Z98.890      PLAN:  Reviewed patient's chart in [facility].      5/24/2017   Obtained and interpreted radiographs  This patient has ankle instability  and has developed peroneal tendinitis tendinopathy to accommodate for a very positive anterior drawer unstable right ankle.   Have ordered physical therapy to improve proprioception  Recommend sturdy stable shoes written instructions dispensed  Order for custom molded orthotics to provide some stability  May also consider an ankle brace for a month or 2 or until symptoms are improving then discontinue it  Also begin oral anti-inflammatory for month or 2 or until symptoms improve  After she is established this care if her symptoms continue we will image with x-ray of the foot and ankle and MRI of the right ankle.    Voltaren medication prescribed today is an NSAID.  If you had a recent ulcer you probably have been instructed to discontinue oral NSAIDs. Do not start this medication if you have been instructed to discontinue use of oral NSAIDs or few have an active or recently active gastric ulcer.    11/2/2017  She did not utilize the oral anti-inflammatories secondary to her gastric ulcer, second one.  Has done PT for several visits and she is doing them at home still.   She is able to perform unilateral toe raise but with challenges on the right  MRI right ankle ordered today  Follow-up after MRI      Delgado Martinez DPM    Again, thank you for allowing me to participate in the care of your patient.        Sincerely,        Delgado Martinez DPM

## 2017-11-14 ENCOUNTER — HOSPITAL ENCOUNTER (OUTPATIENT)
Dept: MRI IMAGING | Facility: CLINIC | Age: 32
Discharge: HOME | End: 2017-11-14
Attending: PODIATRIST | Admitting: PODIATRIST
Payer: COMMERCIAL

## 2017-11-14 DIAGNOSIS — M76.71 PERONEAL TENDONITIS OF RIGHT LOWER EXTREMITY: ICD-10-CM

## 2017-11-14 DIAGNOSIS — M25.371 RIGHT ANKLE INSTABILITY: ICD-10-CM

## 2017-11-14 PROCEDURE — 73721 MRI JNT OF LWR EXTRE W/O DYE: CPT | Mod: RT

## 2017-11-21 ENCOUNTER — OFFICE VISIT (OUTPATIENT)
Dept: FAMILY MEDICINE | Facility: OTHER | Age: 32
End: 2017-11-21
Payer: COMMERCIAL

## 2017-11-21 VITALS
RESPIRATION RATE: 12 BRPM | TEMPERATURE: 95.9 F | SYSTOLIC BLOOD PRESSURE: 116 MMHG | BODY MASS INDEX: 25.02 KG/M2 | HEART RATE: 76 BPM | WEIGHT: 155.7 LBS | HEIGHT: 66 IN | DIASTOLIC BLOOD PRESSURE: 60 MMHG | OXYGEN SATURATION: 99 %

## 2017-11-21 DIAGNOSIS — Z01.818 PREOP GENERAL PHYSICAL EXAM: Primary | ICD-10-CM

## 2017-11-21 DIAGNOSIS — N95.1 SYMPTOMATIC MENOPAUSAL OR FEMALE CLIMACTERIC STATES: ICD-10-CM

## 2017-11-21 DIAGNOSIS — R10.9 ABDOMINAL PAIN, UNSPECIFIED ABDOMINAL LOCATION: ICD-10-CM

## 2017-11-21 DIAGNOSIS — E03.9 ACQUIRED HYPOTHYROIDISM: ICD-10-CM

## 2017-11-21 LAB
BASOPHILS # BLD AUTO: 0 10E9/L (ref 0–0.2)
BASOPHILS NFR BLD AUTO: 0.4 %
DIFFERENTIAL METHOD BLD: ABNORMAL
EOSINOPHIL # BLD AUTO: 0.1 10E9/L (ref 0–0.7)
EOSINOPHIL NFR BLD AUTO: 1.4 %
ERYTHROCYTE [DISTWIDTH] IN BLOOD BY AUTOMATED COUNT: 12.5 % (ref 10–15)
HCT VFR BLD AUTO: 36.9 % (ref 35–47)
HGB BLD-MCNC: 12.4 G/DL (ref 11.7–15.7)
LYMPHOCYTES # BLD AUTO: 1.3 10E9/L (ref 0.8–5.3)
LYMPHOCYTES NFR BLD AUTO: 17.2 %
MCH RBC QN AUTO: 34.3 PG (ref 26.5–33)
MCHC RBC AUTO-ENTMCNC: 33.6 G/DL (ref 31.5–36.5)
MCV RBC AUTO: 102 FL (ref 78–100)
MONOCYTES # BLD AUTO: 0.4 10E9/L (ref 0–1.3)
MONOCYTES NFR BLD AUTO: 5.4 %
NEUTROPHILS # BLD AUTO: 5.6 10E9/L (ref 1.6–8.3)
NEUTROPHILS NFR BLD AUTO: 75.6 %
PLATELET # BLD AUTO: 333 10E9/L (ref 150–450)
RBC # BLD AUTO: 3.62 10E12/L (ref 3.8–5.2)
T4 FREE SERPL-MCNC: 0.26 NG/DL (ref 0.76–1.46)
TSH SERPL DL<=0.005 MIU/L-ACNC: 75.21 MU/L (ref 0.4–4)
WBC # BLD AUTO: 7.4 10E9/L (ref 4–11)

## 2017-11-21 PROCEDURE — 84443 ASSAY THYROID STIM HORMONE: CPT | Performed by: FAMILY MEDICINE

## 2017-11-21 PROCEDURE — 84439 ASSAY OF FREE THYROXINE: CPT | Performed by: FAMILY MEDICINE

## 2017-11-21 PROCEDURE — 99214 OFFICE O/P EST MOD 30 MIN: CPT | Performed by: PHYSICIAN ASSISTANT

## 2017-11-21 PROCEDURE — 85025 COMPLETE CBC W/AUTO DIFF WBC: CPT | Performed by: FAMILY MEDICINE

## 2017-11-21 PROCEDURE — 36415 COLL VENOUS BLD VENIPUNCTURE: CPT | Performed by: FAMILY MEDICINE

## 2017-11-21 RX ORDER — ESTRADIOL 0.07 MG/D
1 PATCH TRANSDERMAL WEEKLY
Qty: 12 PATCH | Refills: 3 | Status: SHIPPED | OUTPATIENT
Start: 2017-11-21 | End: 2018-11-29

## 2017-11-21 ASSESSMENT — PAIN SCALES - GENERAL: PAINLEVEL: MILD PAIN (3)

## 2017-11-21 NOTE — NURSING NOTE
"Chief Complaint   Patient presents with     Pre-Op Exam       Initial /60 (BP Location: Left arm, Patient Position: Chair, Cuff Size: Adult Regular)  Pulse 76  Temp 95.9  F (35.5  C) (Oral)  Resp 12  Ht 5' 5.5\" (1.664 m)  Wt 155 lb 11.2 oz (70.6 kg)  LMP 09/01/2008  SpO2 99%  BMI 25.52 kg/m2 Estimated body mass index is 25.52 kg/(m^2) as calculated from the following:    Height as of this encounter: 5' 5.5\" (1.664 m).    Weight as of this encounter: 155 lb 11.2 oz (70.6 kg).  Medication Reconciliation: viviana MARINO LPN      "

## 2017-11-21 NOTE — PROGRESS NOTES
[place] [address] [facility] [address]  [phone]  Dept: [phone]    PRE-OP EVALUATION:  Today's date: 2017    Lida Blood (: 1985) presents for pre-operative evaluation assessment as requested by Dr. Sarah.  She requires evaluation and anesthesia risk assessment prior to undergoing surgery/procedure for treatment of LUQ pain .  Proposed procedure: Laparoscopy     Date of Surgery/ Procedure: 2017  Time of Surgery/ Procedure: to be determined  Hospital/Surgical Facility: [facility]  Fax number for surgical facility: [phone]  Primary Physician: Joaquin Dudley  Type of Anesthesia Anticipated: General    Patient has a Health Care Directive or Living Will:  NO    Preop Questions 2017   1.  Do you have a history of heart attack, stroke, stent, bypass or surgery on an artery in the head, neck, heart or legs? No   2.  Do you ever have any pain or discomfort in your chest? No   3.  Do you have a history of  Heart Failure? No   4.   Are you troubled by shortness of breath when:  walking on a level surface, or up a slight hill, or at night? No   5.  Do you currently have a cold, bronchitis or other respiratory infection? No   6.  Do you have a cough, shortness of breath, or wheezing? YES - URI symptoms   7.  Do you sometimes get pains in the calves of your legs when you walk? No   8. Do you or anyone in your family have previous history of blood clots? No   9.  Do you or does anyone in your family have a serious bleeding problem such as prolonged bleeding following surgeries or cuts? No   10. Have you ever had problems with anemia or been told to take iron pills? No   11. Have you had any abnormal blood loss such as black, tarry or bloody stools, or abnormal vaginal bleeding? YES - bleeding ulcer   12. Have you ever had a blood transfusion? No   13. Have you or any of your relatives ever had problems with anesthesia? No   14. Do you  have sleep apnea, excessive snoring or daytime drowsiness? No   15. Do you have any prosthetic heart valves? No   16. Do you have prosthetic joints? No   17. Is there any chance that you may be pregnant? No       HPI:                                                      Brief HPI related to upcoming procedure: Patient is having laparoscopy       See problem list for active medical problems.  Problems all longstanding and stable, except as noted/documented.  See ROS for pertinent symptoms related to these conditions.                                                                                                  .    MEDICAL HISTORY:                                                    Patient Active Problem List    Diagnosis Date Noted     Abdominal pain 05/24/2013     Priority: High     Vomiting and diarrhea 05/23/2013     Priority: High     Abdominal pain, acute, epigastric 04/23/2012     Priority: High     S/P gastric bypass 08/10/2017     Priority: Medium     LUQ abdominal pain 08/16/2016     Priority: Medium     Ileus (H) 08/16/2016     Priority: Medium     Dehydration 03/31/2016     Priority: Medium     Obesity 03/15/2016     Priority: Medium     Gastric ulcer 12/02/2015     Priority: Medium     Hypokalemia 12/01/2015     Priority: Medium     Hypoglycemia 12/01/2015     Priority: Medium     Gastroesophageal reflux disease without esophagitis 10/16/2015     Priority: Medium     RLS (restless legs syndrome) 04/05/2013     Priority: Medium     Migraine headache 10/14/2011     Priority: Medium     Physiological ovarian cysts 04/28/2011     Priority: Medium     CARDIOVASCULAR SCREENING; LDL GOAL LESS THAN 160 10/31/2010     Priority: Medium     Vaginal discharge 07/15/2009     Priority: Medium     Surgery follow-up examination 07/15/2009     Priority: Medium     Endometriosis 10/24/2008     Priority: Medium     Major depressive disorder, single episode, moderate (H)      Priority: Medium     Backache 01/22/2004      Priority: Medium     Problem list name updated by automated process. Provider to review       Hypothyroidism 09/26/2003     Priority: Medium     Problem list name updated by automated process. Provider to review       Anxiety state 05/08/2002     Priority: Medium     Problem list name updated by automated process. Provider to review        Past Medical History:   Diagnosis Date     Endometriosis of pelvic peritoneum 10/06/08     Endometriosis of uterus 10/06/08    Admit. 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    s/p radioactive iodine ablation Rx     Major depressive disorder, single episode, moderate (H)      Migraine headache 10/14/2011     Obesity (BMI 30-39.9)      Other and unspecified ovarian cyst 03/02/10    d/c 03/04/10     Pain pelvic 12/14/04    Discharged 12/17/04-[facility]     PONV (postoperative nausea and vomiting)      S/P hysterectomy      Thyrotoxicosis without mention of goiter or other cause, without mention of thyrotoxic crisis or storm 2001     trachelectomy 05/19/09     Past Surgical History:   Procedure Laterality Date     C APPENDECTOMY  4/18/2003     C REMOVAL OF CERVIX  05/29/09    laparoscopic assisted vaginal trachelectomy     C SUPRACERV ABD HYSTERECTOMY  10/06/08     COLONOSCOPY  11/25/14     ESOPHAGOSCOPY, GASTROSCOPY, DUODENOSCOPY (EGD), COMBINED N/A 8/31/2015    Procedure: COMBINED ESOPHAGOSCOPY, GASTROSCOPY, DUODENOSCOPY (EGD), BIOPSY SINGLE OR MULTIPLE;  Surgeon: Toni Wiggins MD;  Location:  GI     ESOPHAGOSCOPY, GASTROSCOPY, DUODENOSCOPY (EGD), COMBINED N/A 8/18/2016    Procedure: COMBINED ESOPHAGOSCOPY, GASTROSCOPY, DUODENOSCOPY (EGD);  Surgeon: Jaswinder Waddell MD;  Location:  GI     ESOPHAGOSCOPY, GASTROSCOPY, DUODENOSCOPY (EGD), COMBINED N/A 3/1/2017    Procedure: COMBINED ESOPHAGOSCOPY, GASTROSCOPY, DUODENOSCOPY (EGD), BIOPSY SINGLE OR MULTIPLE;  Surgeon:  Jan Johnston MD;  Location:  GI     ESOPHAGOSCOPY, GASTROSCOPY, DUODENOSCOPY (EGD), DILATATION, COMBINED N/A 4/13/2016    Procedure: COMBINED ESOPHAGOSCOPY, GASTROSCOPY, DUODENOSCOPY (EGD), DILATATION;  Surgeon: Jos Dinero MD;  Location:  GI     EYE SURGERY      bilat orbital decompression surgery      HC COLONOSCOPY W BIOPSY  1/22/2007     HC COLONOSCOPY W/WO BRUSH/WASH  12/16/04     HC CREATE EARDRUM OPENING,GEN ANESTH  12/29/88    Bilateral myringotomy with insertion of PE tubes     HC CREATE EARDRUM OPENING,GEN ANESTH  12/20/90    Bilateral myringotomy with insertion of PE tubes     HC LAP,FULGURATE/EXCISE LESIONS  06/16/08    Laparoscopy, ablation of the endometriosis and diagnostic cystoscopy     HC LAPAROSCOPY, SURGICAL, ABDOMEN, PERITONEUM & OMENTUM; DX W/ OR W/O SPECIMEN(S)  04/18/2003    Diagnostic laparoscopy     HC REMOVAL GALLBLADDER  8/1/03     HC TYMPANOPLASTY W/O MASTOID, INIT/REV W/O OSS CHAIN RECONST  10/03/00    Left exploratory tympanoplasty, myringoplasty, fascia grafting of the oval window, microdissection via the use of operative microscope     HC UGI ENDOSCOPY, SIMPLE EXAM  3/23/12, 11/13/14     HERNIA REPAIR, INCISIONAL  08/23/2006    Laparoscopic mesh repair.     HYSTERECTOMY, PAP NO LONGER INDICATED  5/29/09     HYSTEROSCOPY  02/08/08     LAPAROSCOPIC GASTRECTOMY N/A 3/15/2016    Procedure: LAPAROSCOPIC GASTRECTOMY;  Surgeon: Jos Dinero MD;  Location:  OR     LAPAROSCOPIC LYSIS ADHESIONS  11/02/10    [facility]     LAPAROSCOPIC REVISION GASTROPLASTY TO ELBERT-EN-Y N/A 10/26/2015    Procedure: LAPAROSCOPIC REVISION GASTROPLASTY TO ELBERT-EN-Y;  Surgeon: Toni Wiggins MD;  Location:  OR     LAPAROSCOPIC SALPINGO-OOPHORECTOMY Left 3/2015     LAPAROSCOPY DIAGNOSTIC (GENERAL) Right 06/23/11    Evaluation of abdomen/pelvis, RSO, Cysto; [facility]     Current Outpatient Prescriptions   Medication Sig Dispense Refill     estradiol 0.075 MG/24HR  "PTWK Place 1 patch onto the skin once a week 12 patch 3     OMEPRAZOLE PO Take 40 mg by mouth 2 times daily (before meals)       levothyroxine (SYNTHROID, LEVOTHROID) 200 MCG tablet Take 1 tablet (200 mcg) by mouth daily 30 tablet 1     levothyroxine (SYNTHROID, LEVOTHROID) 75 MCG tablet Take 1 tablet (75 mcg) by mouth daily Take along with 200 mcg tablet daily. 90 tablet 1     CALCIUM PO Take by mouth daily       Cholecalciferol (VITAMIN D PO) Take by mouth daily       escitalopram (LEXAPRO) 20 MG tablet Take 1 tablet (20 mg) by mouth daily 90 tablet 1     [DISCONTINUED] estradiol 0.075 MG/24HR PTWK Place 1 patch onto the skin once a week 12 patch 3     OTC products: None, except as noted above    Allergies   Allergen Reactions     No Known Drug Allergies       Latex Allergy: NO    Social History   Substance Use Topics     Smoking status: Former Smoker     Packs/day: 0.25     Years: 6.00     Types: Cigarettes     Quit date: 6/16/2015     Smokeless tobacco: Never Used      Comment: social     Alcohol use Yes      Comment: rare     History   Drug Use No       REVIEW OF SYSTEMS:                                                    Constitutional, neuro, ENT, endocrine, pulmonary, cardiac, gastrointestinal, genitourinary, musculoskeletal, integument and psychiatric systems are negative, except as otherwise noted.      EXAM:                                                    /60 (BP Location: Left arm, Patient Position: Chair, Cuff Size: Adult Regular)  Pulse 76  Temp 95.9  F (35.5  C) (Oral)  Resp 12  Ht 5' 5.5\" (1.664 m)  Wt 155 lb 11.2 oz (70.6 kg)  LMP 09/01/2008  SpO2 99%  BMI 25.52 kg/m2    GENERAL APPEARANCE: healthy, alert and no distress     EYES: EOMI, PERRL     HENT: ear canals and TM's normal and nose and mouth without ulcers or lesions     NECK: no adenopathy, no asymmetry, masses, or scars and thyroid normal to palpation     RESP: lungs clear to auscultation - no rales, rhonchi or wheezes     " CV: regular rates and rhythm     ABDOMEN: bowel sounds normal     MS: extremities normal- no gross deformities noted, no evidence of inflammation in joints, FROM in all extremities.     SKIN: no suspicious lesions or rashes     NEURO: Normal strength and tone, sensory exam grossly normal, mentation intact and speech normal     PSYCH: mentation appears normal. and affect normal/bright     LYMPHATICS: No axillary, cervical, or supraclavicular nodes    DIAGNOSTICS:                                                    EKG: Not indicated due to non-vascular surgery and low risk of event (age <65 and without cardiac risk factors)    Recent Labs   Lab Test  07/17/17   1225  03/27/17   0707  03/27/17   0613   03/29/16   1352  12/10/13   HGB  12.4  10.5*  Unsatisfactory specimen - clotted  JOHN PADRON,0637 03/27/17 EAB     < >  12.6   < >   --    PLT  276  217  Unsatisfactory specimen - clotted  JOHN PADRON,0637 03/27/17 EAB     < >  436   < >   --    INR   --    --   0.98   --   0.95   < >   --    NA  141   --   138   < >  140   < >   --    POTASSIUM  4.0   --   4.6   < >  3.9   < >   --    CR  0.84   --   0.92   < >  0.69   < >   --    A1C   --    --    --    --    --    --   5.2    < > = values in this interval not displayed.        IMPRESSION:                                                    Reason for surgery/procedure: exploratory laparoscopy   Diagnosis/reason for consult: Pre Op    The proposed surgical procedure is considered INTERMEDIATE risk.    REVISED CARDIAC RISK INDEX  The patient has the following serious cardiovascular risks for perioperative complications such as (MI, PE, VFib and 3  AV Block):  No serious cardiac risks  INTERPRETATION: 0 risks: Class I (very low risk - 0.4% complication rate)    The patient has the following additional risks for perioperative complications:  No identified additional risks      ICD-10-CM    1. Preop general physical exam Z01.818 CBC with platelets and differential   2.  Acquired hypothyroidism E03.9 TSH with free T4 reflex   3. Symptomatic menopausal or female climacteric states N95.1 estradiol 0.075 MG/24HR PTWK   4. Abdominal pain, unspecified abdominal location R10.9        RECOMMENDATIONS:                                                      --Patient is to take all scheduled medications on the day of surgery EXCEPT for modifications listed below.    APPROVAL GIVEN to proceed with proposed procedure, without further diagnostic evaluation       Signed Electronically by: Yumi Corrales PA-C    Copy of this evaluation report is provided to requesting physician.    [facility] Preop Guidelines

## 2017-11-21 NOTE — MR AVS SNAPSHOT
After Visit Summary   11/21/2017    Lida Blood    MRN: [id number]           Patient Information     Date Of Birth          1985        Visit Information        Provider Department      11/21/2017 7:40 AM Yumi Corrales PA-C [facility]        Today's Diagnoses     Preop general physical exam    -  1    Acquired hypothyroidism        Symptomatic menopausal or female climacteric states        Abdominal pain, unspecified abdominal location          Care Instructions      Before Your Surgery      Call your surgeon if there is any change in your health. This includes signs of a cold or flu (such as a sore throat, runny nose, cough, rash or fever).    Do not smoke, drink alcohol or take over the counter medicine (unless your surgeon or primary care doctor tells you to) for the 24 hours before and after surgery.    If you take prescribed drugs: Follow your doctor s orders about which medicines to take and which to stop until after surgery.    Eating and drinking prior to surgery: follow the instructions from your surgeon    Take a shower or bath the night before surgery. Use the soap your surgeon gave you to gently clean your skin. If you do not have soap from your surgeon, use your regular soap. Do not shave or scrub the surgery site.  Wear clean pajamas and have clean sheets on your bed.           Follow-ups after your visit        Your next 10 appointments already scheduled     Nov 27, 2017  4:00 PM CST   [facility]    [address] [place] [id number] [phone]              Who to contact     If you have questions or need follow up information about today's clinic visit or your schedule please contact [facility] directly at [phone].  Normal or non-critical lab and imaging results will be communicated to you by MyChart, letter or phone within 4  "business days after the clinic has received the results. If you do not hear from us within 7 days, please contact the clinic through StayTuned or phone. If you have a critical or abnormal lab result, we will notify you by phone as soon as possible.  Submit refill requests through StayTuned or call your pharmacy and they will forward the refill request to us. Please allow 3 business days for your refill to be completed.          Additional Information About Your Visit        StayTuned Information     Stay[employer] gives you secure access to your electronic health record. If you see a primary care provider, you can also send messages to your care team and make appointments. If you have questions, please call your primary care clinic.  If you do not have a primary care provider, please call [phone] and they will assist you.        Care EveryWhere ID     This is your Care EveryWhere ID. This could be used by other organizations to access your [place] medical records  [id number]        Your Vitals Were     Pulse Temperature Respirations Height Last Period Pulse Oximetry    76 95.9  F (35.5  C) (Oral) 12 5' 5.5\" (1.664 m) 09/01/2008 99%    BMI (Body Mass Index)                   25.52 kg/m2            Blood Pressure from Last 3 Encounters:   11/21/17 116/60   08/10/17 118/70   07/19/17 120/72    Weight from Last 3 Encounters:   11/21/17 155 lb 11.2 oz (70.6 kg)   11/02/17 156 lb (70.8 kg)   08/10/17 154 lb 8 oz (70.1 kg)              We Performed the Following     CBC with platelets and differential     TSH with free T4 reflex          Where to get your medicines      These medications were sent to [facility] - [facility], [place] - [address]  [address] [address]     Phone:  [phone]     estradiol 0.075 MG/24HR Ptwk          Primary Care Provider Office Phone # Fax #    Joaquin Dudley [phone]       [address] [facility] [address]        Equal Access to " Services     [facility]: Hadii hang Vega, waanneda luqadaha, qaybta kaalmamei maria, garcía cee . So [facility] [phone].    ATENCIÓN: Si armaanla axel, tiene a goldsmith disposición servicios gratuitos de asistencia lingüística. Llame al [phone].    We comply with applicable federal civil rights laws and [place] laws. We do not discriminate on the basis of race, color, national origin, age, disability, sex, sexual orientation, or gender identity.            Thank you!     Thank you for choosing [facility]  for your care. Our goal is always to provide you with excellent care. Hearing back from our patients is one way we can continue to improve our services. Please take a few minutes to complete the written survey that you may receive in the mail after your visit with us. Thank you!             Your Updated Medication List - Protect others around you: Learn how to safely use, store and throw away your medicines at www.disposemymeds.org.          This list is accurate as of: 11/21/17  8:09 AM.  Always use your most recent med list.                   Brand Name Dispense Instructions for use Diagnosis    CALCIUM PO      Take by mouth daily        escitalopram 20 MG tablet    LEXAPRO    90 tablet    Take 1 tablet (20 mg) by mouth daily    Major depressive disorder, single episode, moderate (H)       estradiol 0.075 MG/24HR Ptwk     12 patch    Place 1 patch onto the skin once a week    Symptomatic menopausal or female climacteric states       * levothyroxine 75 MCG tablet    SYNTHROID/LEVOTHROID    90 tablet    Take 1 tablet (75 mcg) by mouth daily Take along with 200 mcg tablet daily.    Acquired hypothyroidism       * levothyroxine 200 MCG tablet    SYNTHROID/LEVOTHROID    30 tablet    Take 1 tablet (200 mcg) by mouth daily    Acquired hypothyroidism       OMEPRAZOLE PO      Take 40 mg by mouth 2 times daily (before meals)        VITAMIN D PO      Take by mouth  daily        * Notice:  This list has 2 medication(s) that are the same as other medications prescribed for you. Read the directions carefully, and ask your doctor or other care provider to review them with you.

## 2017-11-28 ENCOUNTER — TRANSFERRED RECORDS (OUTPATIENT)
Dept: HEALTH INFORMATION MANAGEMENT | Facility: CLINIC | Age: 32
End: 2017-11-28

## 2017-11-29 DIAGNOSIS — E03.9 ACQUIRED HYPOTHYROIDISM: ICD-10-CM

## 2017-11-29 RX ORDER — LEVOTHYROXINE SODIUM 75 UG/1
75 TABLET ORAL DAILY
Qty: 30 TABLET | Refills: 1 | Status: SHIPPED | OUTPATIENT
Start: 2017-11-29 | End: 2018-06-10

## 2017-11-29 RX ORDER — LEVOTHYROXINE SODIUM 200 UG/1
200 TABLET ORAL DAILY
Qty: 30 TABLET | Refills: 1 | Status: SHIPPED | OUTPATIENT
Start: 2017-11-29 | End: 2018-06-10

## 2017-11-29 NOTE — TELEPHONE ENCOUNTER
Wrong pharmacy was selected [facility]. Patient would like rx's sent to [facility].       Renate Fitzpatrick MA 11/29/2017  2:05 PM

## 2017-11-29 NOTE — TELEPHONE ENCOUNTER
Rx's was faxed to [facility] and sent to [facility].       Renate Fitzpatrick MA 11/29/2017  4:10 PM

## 2017-12-04 ENCOUNTER — OFFICE VISIT (OUTPATIENT)
Dept: PODIATRY | Facility: CLINIC | Age: 32
End: 2017-12-04
Payer: COMMERCIAL

## 2017-12-04 VITALS — TEMPERATURE: 97.9 F | BODY MASS INDEX: 24.91 KG/M2 | WEIGHT: 155 LBS | HEIGHT: 66 IN

## 2017-12-04 DIAGNOSIS — M25.371 RIGHT ANKLE INSTABILITY: Primary | ICD-10-CM

## 2017-12-04 DIAGNOSIS — Z98.84 HISTORY OF GASTRIC BYPASS: ICD-10-CM

## 2017-12-04 DIAGNOSIS — M76.71 PERONEAL TENDONITIS OF RIGHT LOWER EXTREMITY: ICD-10-CM

## 2017-12-04 PROCEDURE — 99213 OFFICE O/P EST LOW 20 MIN: CPT | Performed by: PODIATRIST

## 2017-12-04 ASSESSMENT — PAIN SCALES - GENERAL: PAINLEVEL: NO PAIN (0)

## 2017-12-04 NOTE — NURSING NOTE
"Chief Complaint   Patient presents with     Results     MRI Right ankle 11/14/2017     RECHECK     no pain today, lateral Right ankle instability and peroneal tendonitis, onset Fall 2016; LOV 11/2/2017       Initial Temp 97.9  F (36.6  C) (Temporal)  Ht 5' 5.5\" (1.664 m)  Wt 155 lb (70.3 kg)  LMP 09/01/2008  BMI 25.4 kg/m2 Estimated body mass index is 25.4 kg/(m^2) as calculated from the following:    Height as of this encounter: 5' 5.5\" (1.664 m).    Weight as of this encounter: 155 lb (70.3 kg).  BP completed using cuff size: NA (Not Taken)  Medication Reconciliation: complete    Adeline Schmid CMA, December 4, 2017    "

## 2017-12-04 NOTE — MR AVS SNAPSHOT
After Visit Summary   12/4/2017    Lida Blood    MRN: [id number]           Patient Information     Date Of Birth          1985        Visit Information        Provider Department      12/4/2017 1:45 PM Delgado Martinez DPM [facility]        Today's Diagnoses     Right ankle instability    -  1    Peroneal tendonitis of right lower extremity        History of gastric bypass          Care Instructions    Tri Lock ankle brace is a reliable and sturdy ankle brace. A Stromgren double ankle strap, figure of 8 ankle brace or lace up ankle gauntlet or similar brand are most readily available on line, [employer], or WIB delivered for around $20.  Health insurance will not usually pay for these.             Follow-ups after your visit        Who to contact     If you have questions or need follow up information about today's clinic visit or your schedule please contact [facility] directly at [phone].  Normal or non-critical lab and imaging results will be communicated to you by [employer] Advisor[employer], letter or phone within 4 business days after the clinic has received the results. If you do not hear from us within 7 days, please contact the clinic through [employer] or phone. If you have a critical or abnormal lab result, we will notify you by phone as soon as possible.  Submit refill requests through [employer] or call your pharmacy and they will forward the refill request to us. Please allow 3 business days for your refill to be completed.          Additional Information About Your Visit        MyChart Information     [employer] gives you secure access to your electronic health record. If you see a primary care provider, you can also send messages to your care team and make appointments. If you have questions, please call your primary care clinic.  If you do not have a primary care provider, please call [phone] and they will assist you.        Care EveryWhere ID     This is  "your Care EveryWhere ID. This could be used by other organizations to access your [facility] medical records  [id number]        Your Vitals Were     Temperature Height Last Period BMI (Body Mass Index)          97.9  F (36.6  C) (Temporal) 5' 5.5\" (1.664 m) 09/01/2008 25.4 kg/m2         Blood Pressure from Last 3 Encounters:   11/21/17 116/60   08/10/17 118/70   07/19/17 120/72    Weight from Last 3 Encounters:   12/04/17 155 lb (70.3 kg)   11/21/17 155 lb 11.2 oz (70.6 kg)   11/02/17 156 lb (70.8 kg)              Today, you had the following     No orders found for display       Primary Care Provider Office Phone # Fax #    Joaquin Dudley [phone]       [address] [facility] [address]        Equal Access to Services     [facility] : Hadii aad ku hadasho Socindi, waaxda luqadaha, qaybta kaalmada adeegyada, garcía cee . So [facility] [phone].    ATENCIÓN: Si habla español, tiene a goldsmith disposición servicios gratuitos de asistencia lingüística. Melissa al [phone].    We comply with applicable federal civil rights laws and [place] laws. We do not discriminate on the basis of race, color, national origin, age, disability, sex, sexual orientation, or gender identity.            Thank you!     Thank you for choosing [facility]  for your care. Our goal is always to provide you with excellent care. Hearing back from our patients is one way we can continue to improve our services. Please take a few minutes to complete the written survey that you may receive in the mail after your visit with us. Thank you!             Your Updated Medication List - Protect others around you: Learn how to safely use, store and throw away your medicines at www.disposemymeds.org.          This list is accurate as of: 12/4/17  2:05 PM.  Always use your most recent med list.                   Brand Name Dispense Instructions for use Diagnosis    CALCIUM PO      Take by mouth " daily        escitalopram 20 MG tablet    LEXAPRO    90 tablet    Take 1 tablet (20 mg) by mouth daily    Major depressive disorder, single episode, moderate (H)       estradiol 0.075 MG/24HR Ptwk     12 patch    Place 1 patch onto the skin once a week    Symptomatic menopausal or female climacteric states       * levothyroxine 200 MCG tablet    SYNTHROID/LEVOTHROID    30 tablet    Take 1 tablet (200 mcg) by mouth daily    Acquired hypothyroidism       * levothyroxine 75 MCG tablet    SYNTHROID/LEVOTHROID    30 tablet    Take 1 tablet (75 mcg) by mouth daily Take along with 200 mcg tablet daily.    Acquired hypothyroidism       OMEPRAZOLE PO      Take 40 mg by mouth 2 times daily (before meals)        VITAMIN D PO      Take by mouth daily        * Notice:  This list has 2 medication(s) that are the same as other medications prescribed for you. Read the directions carefully, and ask your doctor or other care provider to review them with you.

## 2017-12-04 NOTE — PROGRESS NOTES
Chief Complaint   Patient presents with     Results     MRI Right ankle 11/14/2017     RECHECK     no pain today, lateral Right ankle instability and peroneal tendonitis, onset Fall 2016; LOV 11/2/2017       Weight management plan: Patient was referred to their PCP to discuss a diet and exercise plan.    HPI:  Lida Blood is a 31 year old female who is seen in consultation at the request of self.    Pt presents for eval of:   (Onset, Location, L/R, Character, Treatments, Injury if yes)     Onset Fall 2016, started out as intermittent lateral Right ankle pain now is more constant  Dull ache, radiates up side of lower leg, burning, pain feels like it moves, swelling, pain 4. History of previous right ankle sprain in high school as well.  Supportive shoes, ice    Works at [employer].     ROS:  10 point ROS neg other than the symptoms noted above in the HPI.    PAST MEDICAL HISTORY:   Past Medical History:   Diagnosis Date     Endometriosis of pelvic peritoneum 10/06/08     Endometriosis of uterus 10/06/08    Admit. 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    s/p radioactive iodine ablation Rx     Major depressive disorder, single episode, moderate (H)      Migraine headache 10/14/2011     Obesity (BMI 30-39.9)      Other and unspecified ovarian cyst 03/02/10    d/c 03/04/10     Pain pelvic 12/14/04    [facility]charged 12/17/04-[facility]     PONV (postoperative nausea and vomiting)      S/P hysterectomy      Thyrotoxicosis without mention of goiter or other cause, without mention of thyrotoxic crisis or storm 2001     trachelectomy 05/19/09        PAST SURGICAL HISTORY:   Past Surgical History:   Procedure Laterality Date     C APPENDECTOMY  4/18/2003     C REMOVAL OF CERVIX  05/29/09    laparoscopic assisted vaginal trachelectomy     C SUPRACERV ABD HYSTERECTOMY  10/06/08     COLONOSCOPY  11/25/14      ESOPHAGOSCOPY, GASTROSCOPY, DUODENOSCOPY (EGD), COMBINED N/A 8/31/2015    Procedure: COMBINED ESOPHAGOSCOPY, GASTROSCOPY, DUODENOSCOPY (EGD), BIOPSY SINGLE OR MULTIPLE;  Surgeon: Toni Wiggins MD;  Location:  GI     ESOPHAGOSCOPY, GASTROSCOPY, DUODENOSCOPY (EGD), COMBINED N/A 8/18/2016    Procedure: COMBINED ESOPHAGOSCOPY, GASTROSCOPY, DUODENOSCOPY (EGD);  Surgeon: Jaswinder Waddell MD;  Location:  GI     ESOPHAGOSCOPY, GASTROSCOPY, DUODENOSCOPY (EGD), COMBINED N/A 3/1/2017    Procedure: COMBINED ESOPHAGOSCOPY, GASTROSCOPY, DUODENOSCOPY (EGD), BIOPSY SINGLE OR MULTIPLE;  Surgeon: Jan Johnston MD;  Location:  GI     ESOPHAGOSCOPY, GASTROSCOPY, DUODENOSCOPY (EGD), DILATATION, COMBINED N/A 4/13/2016    Procedure: COMBINED ESOPHAGOSCOPY, GASTROSCOPY, DUODENOSCOPY (EGD), DILATATION;  Surgeon: Jos Dinero MD;  Location:  GI     EYE SURGERY      bilat orbital decompression surgery      HC COLONOSCOPY W BIOPSY  1/22/2007     HC COLONOSCOPY W/WO BRUSH/WASH  12/16/04     HC CREATE EARDRUM OPENING,GEN ANESTH  12/29/88    Bilateral myringotomy with insertion of PE tubes     HC CREATE EARDRUM OPENING,GEN ANESTH  12/20/90    Bilateral myringotomy with insertion of PE tubes     HC LAP,FULGURATE/EXCISE LESIONS  06/16/08    Laparoscopy, ablation of the endometriosis and diagnostic cystoscopy     HC LAPAROSCOPY, SURGICAL, ABDOMEN, PERITONEUM & OMENTUM; DX W/ OR W/O SPECIMEN(S)  04/18/2003    Diagnostic laparoscopy     HC REMOVAL GALLBLADDER  8/1/03     HC TYMPANOPLASTY W/O MASTOID, INIT/REV W/O OSS CHAIN RECONST  10/03/00    Left exploratory tympanoplasty, myringoplasty, fascia grafting of the oval window, microdissection via the use of operative microscope     HC UGI ENDOSCOPY, SIMPLE EXAM  3/23/12, 11/13/14     HERNIA REPAIR, INCISIONAL  08/23/2006    Laparoscopic mesh repair.     HYSTERECTOMY, PAP NO LONGER INDICATED  5/29/09     HYSTEROSCOPY  02/08/08     LAPAROSCOPIC GASTRECTOMY N/A  3/15/2016    Procedure: LAPAROSCOPIC GASTRECTOMY;  Surgeon: Jos Dinero MD;  Location: [facility]     LAPAROSCOPIC LYSIS ADHESIONS  11/02/10    [facility]     LAPAROSCOPIC REVISION GASTROPLASTY TO ELBERT-EN-Y N/A 10/26/2015    Procedure: LAPAROSCOPIC REVISION GASTROPLASTY TO ELBERT-EN-Y;  Surgeon: Toni Wiggins MD;  Location: [facility] OR     LAPAROSCOPIC SALPINGO-OOPHORECTOMY Left 3/2015     LAPAROSCOPY DIAGNOSTIC (GENERAL) Right 06/23/11    Evaluation of abdomen/pelvis, RSO, Cysto; [facility]        MEDICATIONS:   Current Outpatient Prescriptions:      levothyroxine (SYNTHROID/LEVOTHROID) 200 MCG tablet, Take 1 tablet (200 mcg) by mouth daily, Disp: 30 tablet, Rfl: 1     levothyroxine (SYNTHROID/LEVOTHROID) 75 MCG tablet, Take 1 tablet (75 mcg) by mouth daily Take along with 200 mcg tablet daily., Disp: 30 tablet, Rfl: 1     estradiol 0.075 MG/24HR PTWK, Place 1 patch onto the skin once a week, Disp: 12 patch, Rfl: 3     OMEPRAZOLE PO, Take 40 mg by mouth 2 times daily (before meals), Disp: , Rfl:      CALCIUM PO, Take by mouth daily, Disp: , Rfl:      Cholecalciferol (VITAMIN D PO), Take by mouth daily, Disp: , Rfl:      escitalopram (LEXAPRO) 20 MG tablet, Take 1 tablet (20 mg) by mouth daily, Disp: 90 tablet, Rfl: 1     ALLERGIES:    Allergies   Allergen Reactions     No Known Drug Allergies         SOCIAL HISTORY:   Social History     Social History     Marital status:      Spouse name: patric     Number of children: 01     Years of education: 12     Occupational History     specialist Other     [employer]     Social History Main Topics     Smoking status: Former Smoker     Packs/day: 0.25     Years: 6.00     Types: Cigarettes     Quit date: 6/16/2015     Smokeless tobacco: Never Used      Comment: social     Alcohol use Yes      Comment: rare     Drug use: No     Sexual activity: Yes     Partners: Male     Birth control/ protection: Surgical      Comment: hysterectomy 2008     Other Topics  "Concern      Service No     Blood Transfusions No     Caffeine Concern No     Occupational Exposure No     Hobby Hazards No     Sleep Concern No     Stress Concern Yes     Weight Concern Yes     she exercised all the time     Special Diet No     Back Care No     Exercise Yes     all the time     Bike Helmet No     Seat Belt Yes     Self-Exams No     Social History Narrative        FAMILY HISTORY:   Family History   Problem Relation Age of Onset     Breast Cancer Mother      age 42 on chemo and radiation     Gynecology Mother      endometriosis     DIABETES Maternal Grandmother      type 1     Thyroid Disease Maternal Grandmother      C.A.D. Maternal Grandmother      Cardiovascular Maternal Grandmother      HEART DISEASE Maternal Grandmother      HEART DISEASE Paternal Grandmother      DIABETES Maternal Grandfather      diabetes     Thyroid Disease Maternal Aunt      HEART DISEASE Maternal Aunt      great MGA     Gynecology Sister      endometriosis     Anesthesia Reaction No family hx of         EXAM:Vitals: Temp 97.9  F (36.6  C) (Temporal)  Ht 5' 5.5\" (1.664 m)  Wt 155 lb (70.3 kg)  LMP 09/01/2008  BMI 25.4 kg/m2  BMI= Body mass index is 25.4 kg/(m^2).    General appearance: Patient is alert and fully cooperative with history & exam.  No sign of distress is noted during the visit.     Psychiatric: Affect is pleasant & appropriate.  Patient appears motivated to improve health.     Respiratory: Breathing is regular & unlabored while sitting.     HEENT: Hearing is intact to spoken word.  Speech is clear.  No gross evidence of visual impairment that would impact ambulation.     Vascular: DP & PT pulses are intact & regular bilaterally.  No significant edema or varicosities noted.  CFT and skin temperature is normal to both lower extremities.     Neurologic: Lower extremity sensation is intact to light touch.  No evidence of weakness or contracture in the lower extremities.  No evidence of " neuropathy.    Dermatologic: Skin is intact to both lower extremities with adequate texture, turgor and tone about the integument.  No paronychia or evidence of soft tissue infection is noted.     Musculoskeletal: Patient is ambulatory without assistive device or brace.  No gross ankle deformity noted.  No foot or ankle joint effusion is noted.  Very positive anterior drawer noted about the right ankle but not on the left. There is crepitus within the peroneal tendons on the right foot with very minimal induration. Peroneal tendons are strong. She is able to perform a unilateral toe raise right and left foot however much diminished proprioception is noted with balance and unilateral toe raise on the right when compared to the left.  No obvious or gross peroneal subluxation is noted however the peroneal tendons are slightly more prominent about the posterior fibula when performing manual muscle strength testing on the right when compared to the left. All pain is noted about the peroneal tendons distal fibula anterior and posterior as well as a little bit about the lateral ankle gutter but not about the anterior margin of the ankle.    Imaging: Radiographs of the right ankle demonstrate no joint space narrowing and no loose bodies or osteophytes noted. Rectus foot type noted. No joint diastases.    MRI: Flattening of the peroneal tendons with indistinct peroneal groove. No tendinosis. Lateral ankle ligaments are identified on MRI however very positive anterior drawer is noted clinically.     ASSESSMENT:       ICD-10-CM    1. Right ankle instability M25.371    2. Peroneal tendonitis of right lower extremity M76.71    3. History of gastric bypass Z98.890         PLAN:  Reviewed patient[facility] in [facility].      5/24/2017   Obtained and interpreted radiographs  This patient has ankle instability and has developed peroneal tendinitis tendinopathy to accommodate for a very positive anterior drawer unstable right ankle.   Have  ordered physical therapy to improve proprioception  Recommend sturdy stable shoes written instructions dispensed  Order for custom molded orthotics to provide some stability  May also consider an ankle brace for a month or 2 or until symptoms are improving then discontinue it  Also begin oral anti-inflammatory for month or 2 or until symptoms improve  After she is established this care if her symptoms continue we will image with x-ray of the foot and ankle and MRI of the right ankle.    Voltaren medication prescribed today is an NSAID.  If you had a recent ulcer you probably have been instructed to discontinue oral NSAIDs. Do not start this medication if you have been instructed to discontinue use of oral NSAIDs or few have an active or recently active gastric ulcer.    11/2/2017  She did not utilize the oral anti-inflammatories secondary to her gastric ulcer, second one.  Has done PT for several visits and she is doing them at home still.   She is able to perform unilateral toe raise but with challenges on the right  MRI right ankle ordered today  Follow-up after MRI    12/4/2017  Reviewed MRI findings demonstrating mild flattening of the peroneal tendons with probable low-lying muscle belly and very indistinct peroneal groove. Also ATF CF ligaments are intact however there likely elongated as she has a very positive anterior drawer when compared to the other side clinically. We discussed treatment options of continued physical therapy to improve balance strength proprioception which she is doing at home. We discussed continued orthotics and appropriate shoe gear which she is using. We discussed injections and surgical stabilization area this would likely include Arthrex ankle stabilization with scope. We also discussed peroneal regrooving. At this time she would like to return to work and activities as tolerated and follow-up to schedule reconstruction if she does not improve.    All questions were answered.  Follow-up as needed.    Delgado Martinez DPM

## 2017-12-04 NOTE — PATIENT INSTRUCTIONS
Tri Lock ankle brace is a reliable and sturdy ankle brace. A Stromgren double ankle strap, figure of 8 ankle brace or lace up ankle gauntlet or similar brand are most readily available on line, [employer], or [employer] delivered for around $20.  Health insurance will not usually pay for these.

## 2018-03-21 ENCOUNTER — OFFICE VISIT (OUTPATIENT)
Dept: PODIATRY | Facility: OTHER | Age: 33
End: 2018-03-21
Payer: COMMERCIAL

## 2018-03-21 ENCOUNTER — TELEPHONE (OUTPATIENT)
Dept: PODIATRY | Facility: CLINIC | Age: 33
End: 2018-03-21

## 2018-03-21 VITALS — BODY MASS INDEX: 25.33 KG/M2 | HEIGHT: 65 IN | WEIGHT: 152 LBS | TEMPERATURE: 97.1 F

## 2018-03-21 DIAGNOSIS — M25.371 RIGHT ANKLE INSTABILITY: Primary | ICD-10-CM

## 2018-03-21 DIAGNOSIS — M76.71 PERONEAL TENDONITIS OF RIGHT LOWER EXTREMITY: ICD-10-CM

## 2018-03-21 PROCEDURE — 99213 OFFICE O/P EST LOW 20 MIN: CPT | Performed by: PODIATRIST

## 2018-03-21 ASSESSMENT — PAIN SCALES - GENERAL: PAINLEVEL: MILD PAIN (3)

## 2018-03-21 NOTE — PROGRESS NOTES
Chief Complaint   Patient presents with     RECHECK     Follow up Right Ankle Instability and peroneal tendonitis.       Chief Complaint   Patient presents with     RECHECK     Follow up Right Ankle Instability and peroneal tendonitis.         Weight management plan: Patient was referred to their PCP to discuss a diet and exercise plan.    HPI:     Onset Fall 2016, started out as intermittent lateral Right ankle pain.  Minor ankle sprains off and on throughout her life.  Ankle has become more weaker over the last few years and then she has developed what she feels are the tendons behind her fibula pop out of place and her ankle becomes weaker and weaker and more painful up the outside of her leg.  She notes that she is unable to balance on the right foot as well as the left and her right ankle seems to be weaker even though she has been bracing it and performing physical therapy and working on balance.  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  Dull ache, radiates up side of lower leg, burning, pain feels like it moves, swelling, pain 4. History of previous right ankle sprain in high school as well.  Supportive shoes, ice    Works at [employer].     ROS:  10 point ROS neg other than the symptoms noted above in the HPI.    PAST MEDICAL HISTORY:   Past Medical History:   Diagnosis Date     Endometriosis of pelvic peritoneum 10/06/08     Endometriosis of uterus 10/06/08    Admit. 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    s/p radioactive iodine ablation Rx     Major depressive disorder, single episode, moderate (H)      Migraine headache 10/14/2011     Obesity (BMI 30-39.9)      Other and unspecified ovarian cyst 03/02/10    d/c 03/04/10     Pain pelvic 12/14/04    Discharged 12/17/04-[facility]     PONV (postoperative  nausea and vomiting)      S/P hysterectomy      Thyrotoxicosis without mention of goiter or other cause, without mention of thyrotoxic crisis or storm 2001     trachelectomy 05/19/09        PAST SURGICAL HISTORY:   Past Surgical History:   Procedure Laterality Date     C APPENDECTOMY  4/18/2003     C REMOVAL OF CERVIX  05/29/09    laparoscopic assisted vaginal trachelectomy     C SUPRACERV ABD HYSTERECTOMY  10/06/08     COLONOSCOPY  11/25/14     ESOPHAGOSCOPY, GASTROSCOPY, DUODENOSCOPY (EGD), COMBINED N/A 8/31/2015    Procedure: COMBINED ESOPHAGOSCOPY, GASTROSCOPY, DUODENOSCOPY (EGD), BIOPSY SINGLE OR MULTIPLE;  Surgeon: Toni Wiggins MD;  Location:  GI     ESOPHAGOSCOPY, GASTROSCOPY, DUODENOSCOPY (EGD), COMBINED N/A 8/18/2016    Procedure: COMBINED ESOPHAGOSCOPY, GASTROSCOPY, DUODENOSCOPY (EGD);  Surgeon: Jaswinder Waddell MD;  Location:  GI     ESOPHAGOSCOPY, GASTROSCOPY, DUODENOSCOPY (EGD), COMBINED N/A 3/1/2017    Procedure: COMBINED ESOPHAGOSCOPY, GASTROSCOPY, DUODENOSCOPY (EGD), BIOPSY SINGLE OR MULTIPLE;  Surgeon: Jan Johnston MD;  Location: [facility]     ESOPHAGOSCOPY, GASTROSCOPY, DUODENOSCOPY (EGD), DILATATION, COMBINED N/A 4/13/2016    Procedure: COMBINED ESOPHAGOSCOPY, GASTROSCOPY, DUODENOSCOPY (EGD), DILATATION;  Surgeon: Jos Dinero MD;  Location:  GI     EYE SURGERY      bilat orbital decompression surgery      HC COLONOSCOPY W BIOPSY  1/22/2007     HC COLONOSCOPY W/WO BRUSH/WASH  12/16/04     HC CREATE EARDRUM OPENING,GEN ANESTH  12/29/88    Bilateral myringotomy with insertion of PE tubes     HC CREATE EARDRUM OPENING,GEN ANESTH  12/20/90    Bilateral myringotomy with insertion of PE tubes     HC LAP,FULGURATE/EXCISE LESIONS  06/16/08    Laparoscopy, ablation of the endometriosis and diagnostic cystoscopy     HC LAPAROSCOPY, SURGICAL, ABDOMEN, PERITONEUM & OMENTUM; DX W/ OR W/O SPECIMEN(S)  04/18/2003    Diagnostic laparoscopy     HC REMOVAL GALLBLADDER  8/1/03     HC  TYMPANOPLASTY W/O MASTOID, INIT/REV W/O OSS CHAIN RECONST  10/03/00    Left exploratory tympanoplasty, myringoplasty, fascia grafting of the oval window, microdissection via the use of operative microscope     HC UGI ENDOSCOPY, SIMPLE EXAM  3/23/12, 11/13/14     HERNIA REPAIR, INCISIONAL  08/23/2006    Laparoscopic mesh repair.     HYSTERECTOMY, PAP NO LONGER INDICATED  5/29/09     HYSTEROSCOPY  02/08/08     LAPAROSCOPIC GASTRECTOMY N/A 3/15/2016    Procedure: LAPAROSCOPIC GASTRECTOMY;  Surgeon: Jos Dinero MD;  Location: [facility]     LAPAROSCOPIC LYSIS ADHESIONS  11/02/10    [facility]     LAPAROSCOPIC REVISION GASTROPLASTY TO ELBERT-EN-Y N/A 10/26/2015    Procedure: LAPAROSCOPIC REVISION GASTROPLASTY TO ELBERT-EN-Y;  Surgeon: Toni Wiggins MD;  Location: [facility]     LAPAROSCOPIC SALPINGO-OOPHORECTOMY Left 3/2015     LAPAROSCOPY DIAGNOSTIC (GENERAL) Right 06/23/11    Evaluation of abdomen/pelvis, RSO, Cysto; [facility]        MEDICATIONS:   Current Outpatient Prescriptions:      levothyroxine (SYNTHROID/LEVOTHROID) 200 MCG tablet, Take 1 tablet (200 mcg) by mouth daily, Disp: 30 tablet, Rfl: 1     levothyroxine (SYNTHROID/LEVOTHROID) 75 MCG tablet, Take 1 tablet (75 mcg) by mouth daily Take along with 200 mcg tablet daily., Disp: 30 tablet, Rfl: 1     estradiol 0.075 MG/24HR PTWK, Place 1 patch onto the skin once a week, Disp: 12 patch, Rfl: 3     CALCIUM PO, Take by mouth daily, Disp: , Rfl:      Cholecalciferol (VITAMIN D PO), Take by mouth daily, Disp: , Rfl:      escitalopram (LEXAPRO) 20 MG tablet, Take 1 tablet (20 mg) by mouth daily, Disp: 90 tablet, Rfl: 1     OMEPRAZOLE PO, Take 40 mg by mouth 2 times daily (before meals), Disp: , Rfl:      ALLERGIES:    Allergies   Allergen Reactions     No Known Drug Allergies         SOCIAL HISTORY:   Social History     Social History     Marital status:      Spouse name: patric     Number of children: 01     Years of education: 12  "    Occupational History     specialist Other     [employer]     Social History Main Topics     Smoking status: Former Smoker     Packs/day: 0.25     Years: 6.00     Types: Cigarettes     Quit date: 6/16/2015     Smokeless tobacco: Never Used      Comment: social     Alcohol use Yes      Comment: rare     Drug use: No     Sexual activity: Yes     Partners: Male     Birth control/ protection: Surgical      Comment: hysterectomy 2008     Other Topics Concern      Service No     Blood Transfusions No     Caffeine Concern No     Occupational Exposure No     Hobby Hazards No     Sleep Concern No     Stress Concern Yes     Weight Concern Yes     she exercised all the time     Special Diet No     Back Care No     Exercise Yes     all the time     Bike Helmet No     Seat Belt Yes     Self-Exams No     Social History Narrative     FAMILY HISTORY:   Family History   Problem Relation Age of Onset     Breast Cancer Mother      age 42 on chemo and radiation     Gynecology Mother      endometriosis     DIABETES Maternal Grandmother      type 1     Thyroid Disease Maternal Grandmother      C.A.D. Maternal Grandmother      Cardiovascular Maternal Grandmother      HEART DISEASE Maternal Grandmother      HEART DISEASE Paternal Grandmother      DIABETES Maternal Grandfather      diabetes     Thyroid Disease Maternal Aunt      HEART DISEASE Maternal Aunt      great MGA     Gynecology Sister      endometriosis     Anesthesia Reaction No family hx of         EXAM:Vitals: Temp 97.1  F (36.2  C) (Temporal)  Ht 5' 5.25\" (1.657 m)  Wt 152 lb (68.9 kg)  LMP 09/01/2008  BMI 25.1 kg/m2  BMI= Body mass index is 25.1 kg/(m^2).    General appearance: Patient is alert and fully cooperative with history & exam.  No sign of distress is noted during the visit.     Psychiatric: Affect is pleasant & appropriate.  Patient appears motivated to improve health.     Respiratory: Breathing is regular & unlabored while sitting.     HEENT: Hearing is " intact to spoken word.  Speech is clear.  No gross evidence of visual impairment that would impact ambulation.     Vascular: DP & PT pulses are intact & regular bilaterally.  No significant edema or varicosities noted.  CFT and skin temperature is normal to both lower extremities.     Neurologic: Lower extremity sensation is intact to light touch.  No evidence of weakness or contracture in the lower extremities.  No evidence of neuropathy.    Dermatologic: Skin is intact to both lower extremities with adequate texture, turgor and tone about the integument.  No paronychia or evidence of soft tissue infection is noted.     Musculoskeletal: Patient is ambulatory without assistive device or brace.  No gross ankle deformity noted.  Fairly rectus foot type is noted with minimal valgus deformity and no medial column faulting.  Calcaneus remains rectus throughout gait.  No foot or ankle joint effusion is noted.  Very positive anterior drawer noted about the right ankle but not on the left.  I do not detect any clinical diastases about the distal tibia and fibula throughout range of motion of the ankle.  There is crepitus within the peroneal tendons on the right foot with very minimal induration. Peroneal tendons are strong.     Much diminished proprioception is noted with balance and unilateral toe raise on the right when compared to the left.  She has diminished proprioception and strength about the right ankle when compared to the left ankle and this has changed over the last several months.  Most pain and symptoms are noted about the peroneal tendons distal fibula anterior and posterior as well as a little bit about the lateral ankle gutter but not about the anterior margin of the ankle.    There is palpable prominence with subtle subluxation of the peroneal tendons on the right only.  There appears to be slightly increased volume of the peroneal tendons with crepitus throughout range of motion and upon extreme active  eversion of the right ankle crepitus is reproduced with prominence of the peroneal tendons but not complete subluxation.    Imaging: Radiographs of the right ankle demonstrate no joint space narrowing and no loose bodies or osteophytes noted.  No loss of trabeculation or osteochondral defect noted.  Rectus foot type noted.  Any widening or diastases of the syndesmosis is not clear but may be possible on these weightbearing ankle radiographs.    MRI 11/14/17: Flattening of the peroneal tendons with indistinct peroneal groove. No tendinosis. Lateral ankle ligaments are present on MRI.  No obvious change, reaction, or loss of syndesmosis noted on MRI.  No inflammatory changes are noted through the syndesmosis and minimal through the capsule.     ASSESSMENT:       ICD-10-CM    1. Right ankle instability M25.371    2. Peroneal tendonitis of right lower extremity M76.71    3. Subluxation of peroneal tendon of right foot, initial encounter S93.04XA         PLAN:  Reviewed patient's chart in [facility].      5/24/2017   Obtained and interpreted radiographs  This patient has ankle instability and has developed peroneal tendinitis tendinopathy to accommodate for a very positive anterior drawer unstable right ankle.   Have ordered physical therapy to improve proprioception  Recommend sturdy stable shoes written instructions dispensed  Order for custom molded orthotics to provide some stability  May also consider an ankle brace for a month or 2 or until symptoms are improving then discontinue it  Also begin oral anti-inflammatory for month or 2 or until symptoms improve  After she is established this care if her symptoms continue we will image with x-ray of the foot and ankle and MRI of the right ankle.    Voltaren medication prescribed today is an NSAID.  If you had a recent ulcer you probably have been instructed to discontinue oral NSAIDs. Do not start this medication if you have been instructed to discontinue use of oral NSAIDs  or few have an active or recently active gastric ulcer.    11/2/2017  She did not utilize the oral anti-inflammatories secondary to her gastric ulcer, second one.  Has done PT for several visits and she is doing them at home still.   She is able to perform unilateral toe raise but with challenges on the right  MRI right ankle ordered today  Follow-up after MRI    12/4/2017  Reviewed MRI findings demonstrating mild flattening of the peroneal tendons with probable low-lying muscle belly and very indistinct peroneal groove. Also ATF CF ligaments are intact however there likely elongated as she has a very positive anterior drawer when compared to the other side clinically. We discussed treatment options of continued physical therapy to improve balance strength proprioception which she is doing at home. We discussed continued orthotics and appropriate shoe gear which she is using. We discussed injections and surgical stabilization area this would likely include Arthrex ankle stabilization with scope. We also discussed peroneal regrooving. At this time she would like to return to work and activities as tolerated and follow-up to schedule reconstruction if she does not improve.    All questions were answered. Follow-up as needed.    3/21/2018  This patient would like to move forward with surgical stabilization of her ankle and peroneal tendon re-grooving.  She feels her ankle has weakened over the last year or 2 and now her peroneal tendons are becoming symptomatic and weaker more symptomatic over time.  She has attempted physical therapy, proprioception training, bracing, orthotics, anti-inflammatories, and over the last year her ankle becomes weaker and peroneal tendons more prominent with subluxation.    We discussed treatment options and alternative treatment options as well as expectations and postoperative needs.  All questions were answered no guarantees were given or implied.  She wishes to proceed with surgical  treatment in the next few weeks and we will move forward with scheduling this .    Delgado Martinez DPM          Please schedule for surgery, pre op H&P, and post ops.    Surgery:  Patient Name:  Lida Blood ([id number])  Procedure:   Right ankle stabilization with Arthrex internal brace  Drill fibula with peroneal tendon re-grooving  Peroneal tendon repair  Diagnosis:    Right ankle instability  Peroneal tendon subluxation  Peroneal tendinitis  Assistant: first assist  Surgeon:  Delgado Martinez DPM  Anesthesia:  General and Post op popliteal block  PT type:  Same Day Surgery  Time needed: 120 minutes  Patient position:  lying supine  Mini fluoro:  Yes  C-arm:  no  Equipment:  arthrex internal brace, drill with approximately 2.0 mm-4 mm bit  Anticoagulation:  No  Vendor:  Request equipment rep Ryan Tong with [employer] will be present for case, office [phone], cell [phone]  Surgeon Notes: Also concern for syndesmosis    Post op appts:    5-7 days po  12-15 days po  approx 4 weeks  approx 8 weeks  just before 12 weeks    Expected work restrictions:  strict no weight bearing in cast or splint for one month followed by one month of protected weight bearing in a fracture boot. Some restrictions for up to 12 weeks    [facility] Discussed:  Not Applicable

## 2018-03-21 NOTE — MR AVS SNAPSHOT
After Visit Summary   3/21/2018    Lida Blood    MRN: [id number]           Patient Information     Date Of Birth          1985        Visit Information        Provider Department      3/21/2018 8:30 AM Delgado Martinez DPM [facility]        Today's Diagnoses     Right ankle instability    -  1    Peroneal tendonitis of right lower extremity        Subluxation of peroneal tendon of right foot, initial encounter          Care Instructions    Follow-up after surgical scheduling.          Follow-ups after your visit        Who to contact     If you have questions or need follow up information about today's clinic visit or your schedule please contact [facility] directly at [phone].  Normal or non-critical lab and imaging results will be communicated to you by [employer], letter or phone within 4 business days after the clinic has received the results. If you do not hear from us within 7 days, please contact the clinic through [employer] or phone. If you have a critical or abnormal lab result, we will notify you by phone as soon as possible.  Submit refill requests through [employer] or call your pharmacy and they will forward the refill request to us. Please allow 3 business days for your refill to be completed.          Additional Information About Your Visit        MyChart Information     [employer] gives you secure access to your electronic health record. If you see a primary care provider, you can also send messages to your care team and make appointments. If you have questions, please call your primary care clinic.  If you do not have a primary care provider, please call [phone] and they will assist you.        Care EveryWhere ID     This is your Care EveryWhere ID. This could be used by other organizations to access your [place] medical records  [id number]        Your Vitals Were     Temperature Height Last Period BMI (Body Mass Index)          97.1  F  "(36.2  C) (Temporal) 5' 5.25\" (1.657 m) 09/01/2008 25.1 kg/m2         Blood Pressure from Last 3 Encounters:   11/21/17 116/60   08/10/17 118/70   07/19/17 120/72    Weight from Last 3 Encounters:   03/21/18 152 lb (68.9 kg)   12/04/17 155 lb (70.3 kg)   11/21/17 155 lb 11.2 oz (70.6 kg)              We Performed the Following     Janeen-Operative Worksheet        Primary Care Provider Office Phone # Fax #    Joaquin Dudley [phone]       [address] [facility] [address]        Equal Access to Services     KAYDEN MORA : Dayana Vega, wajacobo gambino, qaybta kaalmada crow, garcía vergara. So [facility] [phone].    ATENCIÓN: Si habla español, tiene a goldsmith disposición servicios gratuitos de asistencia lingüística. Llame al [phone].    We comply with applicable federal civil rights laws and [place] laws. We do not discriminate on the basis of race, color, national origin, age, disability, sex, sexual orientation, or gender identity.            Thank you!     Thank you for choosing [facility]  for your care. Our goal is always to provide you with excellent care. Hearing back from our patients is one way we can continue to improve our services. Please take a few minutes to complete the written survey that you may receive in the mail after your visit with us. Thank you!             Your Updated Medication List - Protect others around you: Learn how to safely use, store and throw away your medicines at www.disposemymeds.org.          This list is accurate as of 3/21/18  1:11 PM.  Always use your most recent med list.                   Brand Name Dispense Instructions for use Diagnosis    CALCIUM PO      Take by mouth daily        escitalopram 20 MG tablet    LEXAPRO    90 tablet    Take 1 tablet (20 mg) by mouth daily    Major depressive disorder, single episode, moderate (H)       estradiol 0.075 MG/24HR Ptwk     12 patch    Place 1 patch " onto the skin once a week    Symptomatic menopausal or female climacteric states       * levothyroxine 200 MCG tablet    SYNTHROID/LEVOTHROID    30 tablet    Take 1 tablet (200 mcg) by mouth daily    Acquired hypothyroidism       * levothyroxine 75 MCG tablet    SYNTHROID/LEVOTHROID    30 tablet    Take 1 tablet (75 mcg) by mouth daily Take along with 200 mcg tablet daily.    Acquired hypothyroidism       OMEPRAZOLE PO      Take 40 mg by mouth 2 times daily (before meals)        VITAMIN D PO      Take by mouth daily        * Notice:  This list has 2 medication(s) that are the same as other medications prescribed for you. Read the directions carefully, and ask your doctor or other care provider to review them with you.

## 2018-03-21 NOTE — NURSING NOTE
"Chief Complaint   Patient presents with     RECHECK     Follow up Right Ankle Instability and peroneal tendonitis.         Initial Temp 97.1  F (36.2  C) (Temporal)  Ht 5' 5.25\" (1.657 m)  Wt 152 lb (68.9 kg)  LMP 09/01/2008  BMI 25.1 kg/m2 Estimated body mass index is 25.1 kg/(m^2) as calculated from the following:    Height as of this encounter: 5' 5.25\" (1.657 m).    Weight as of this encounter: 152 lb (68.9 kg).  Medication Reconciliation: complete   FAHAD Cid  "

## 2018-03-21 NOTE — TELEPHONE ENCOUNTER
Type of surgery: Right ankle stabilization with Arthrex internal brace drill fibula with peroneal tendon re-grooving peroneal tendon repair  Location of surgery: [facility] OR  Date and time of surgery: 4/6/18  Surgeon: Dr. Martinez  Pre-Op Appt Date: patient will call and schedule  Post-Op Appt Date:    Packet sent out: Yes  Pre-cert/Authorization completed:  Not Applicable  Date:

## 2018-03-21 NOTE — LETTER
3/21/2018         RE: Lida Blood  [id number] [facility] [address]        Dear Colleague,    Thank you for referring your patient, Lida Blood, to the [facility]. Please see a copy of my visit note below.    Chief Complaint   Patient presents with     RECHECK     Follow up Right Ankle Instability and peroneal tendonitis.       Chief Complaint   Patient presents with     RECHECK     Follow up Right Ankle Instability and peroneal tendonitis.         Weight management plan: Patient was referred to their PCP to discuss a diet and exercise plan.    HPI:     Onset Fall 2016, started out as intermittent lateral Right ankle pain.  Minor ankle sprains off and on throughout her life.  Ankle has become more weaker over the last few years and then she has developed what she feels are the tendons behind her fibula pop out of place and her ankle becomes weaker and weaker and more painful up the outside of her leg.  She notes that she is unable to balance on the right foot as well as the left and her right ankle seems to be weaker even though she has been bracing it and performing physical therapy and working on balance.  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  Dull ache, radiates up side of lower leg, burning, pain feels like it moves, swelling, pain 4. History of previous right ankle sprain in high school as well.  Supportive shoes, ice    Works at [email or web address].     ROS:  10 point ROS neg other than the symptoms noted above in the HPI.    PAST MEDICAL HISTORY:   Past Medical History:   Diagnosis Date     Endometriosis of pelvic peritoneum 10/06/08     Endometriosis of uterus 10/06/08    Admit. 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    s/p radioactive iodine ablation Rx     Major depressive  disorder, single episode, moderate (H)      Migraine headache 10/14/2011     Obesity (BMI 30-39.9)      Other and unspecified ovarian cyst 03/02/10    d/c 03/04/10     Pain pelvic 12/14/04    Discharged 12/17/04-[facility]     PONV (postoperative nausea and vomiting)      S/P hysterectomy      Thyrotoxicosis without mention of goiter or other cause, without mention of thyrotoxic crisis or storm 2001     trachelectomy 05/19/09        PAST SURGICAL HISTORY:   Past Surgical History:   Procedure Laterality Date     C APPENDECTOMY  4/18/2003     C REMOVAL OF CERVIX  05/29/09    laparoscopic assisted vaginal trachelectomy     C SUPRACERV ABD HYSTERECTOMY  10/06/08     COLONOSCOPY  11/25/14     ESOPHAGOSCOPY, GASTROSCOPY, DUODENOSCOPY (EGD), COMBINED N/A 8/31/2015    Procedure: COMBINED ESOPHAGOSCOPY, GASTROSCOPY, DUODENOSCOPY (EGD), BIOPSY SINGLE OR MULTIPLE;  Surgeon: Toni Wiggins MD;  Location:  GI     ESOPHAGOSCOPY, GASTROSCOPY, DUODENOSCOPY (EGD), COMBINED N/A 8/18/2016    Procedure: COMBINED ESOPHAGOSCOPY, GASTROSCOPY, DUODENOSCOPY (EGD);  Surgeon: Jaswinder Waddell MD;  Location:  GI     ESOPHAGOSCOPY, GASTROSCOPY, DUODENOSCOPY (EGD), COMBINED N/A 3/1/2017    Procedure: COMBINED ESOPHAGOSCOPY, GASTROSCOPY, DUODENOSCOPY (EGD), BIOPSY SINGLE OR MULTIPLE;  Surgeon: Jan Johnston MD;  Location:  GI     ESOPHAGOSCOPY, GASTROSCOPY, DUODENOSCOPY (EGD), DILATATION, COMBINED N/A 4/13/2016    Procedure: COMBINED ESOPHAGOSCOPY, GASTROSCOPY, DUODENOSCOPY (EGD), DILATATION;  Surgeon: Jos Dinero MD;  Location:  GI     EYE SURGERY      bilat orbital decompression surgery      HC COLONOSCOPY W BIOPSY  1/22/2007     HC COLONOSCOPY W/WO BRUSH/WASH  12/16/04     HC CREATE EARDRUM OPENING,GEN ANESTH  12/29/88    Bilateral myringotomy with insertion of PE tubes     HC CREATE EARDRUM OPENING,GEN ANESTH  12/20/90    Bilateral myringotomy with insertion of PE tubes     HC LAP,FULGURATE/EXCISE  LESIONS  06/16/08    Laparoscopy, ablation of the endometriosis and diagnostic cystoscopy     HC LAPAROSCOPY, SURGICAL, ABDOMEN, PERITONEUM & OMENTUM; DX W/ OR W/O SPECIMEN(S)  04/18/2003    Diagnostic laparoscopy     HC REMOVAL GALLBLADDER  8/1/03     HC TYMPANOPLASTY W/O MASTOID, INIT/REV W/O OSS CHAIN RECONST  10/03/00    Left exploratory tympanoplasty, myringoplasty, fascia grafting of the oval window, microdissection via the use of operative microscope     HC UGI ENDOSCOPY, SIMPLE EXAM  3/23/12, 11/13/14     HERNIA REPAIR, INCISIONAL  08/23/2006    Laparoscopic mesh repair.     HYSTERECTOMY, PAP NO LONGER INDICATED  5/29/09     HYSTEROSCOPY  02/08/08     LAPAROSCOPIC GASTRECTOMY N/A 3/15/2016    Procedure: LAPAROSCOPIC GASTRECTOMY;  Surgeon: Jos Dinero MD;  Location: [facility] OR     LAPAROSCOPIC LYSIS ADHESIONS  11/02/10    [facility]     LAPAROSCOPIC REVISION GASTROPLASTY TO ELBERT-EN-Y N/A 10/26/2015    Procedure: LAPAROSCOPIC REVISION GASTROPLASTY TO ELBERT-EN-Y;  Surgeon: Toni Wiggins MD;  Location: [facility] OR     LAPAROSCOPIC SALPINGO-OOPHORECTOMY Left 3/2015     LAPAROSCOPY DIAGNOSTIC (GENERAL) Right 06/23/11    Evaluation of abdomen/pelvis, RSO, Cysto; [facility]        MEDICATIONS:   Current Outpatient Prescriptions:      levothyroxine (SYNTHROID/LEVOTHROID) 200 MCG tablet, Take 1 tablet (200 mcg) by mouth daily, Disp: 30 tablet, Rfl: 1     levothyroxine (SYNTHROID/LEVOTHROID) 75 MCG tablet, Take 1 tablet (75 mcg) by mouth daily Take along with 200 mcg tablet daily., Disp: 30 tablet, Rfl: 1     estradiol 0.075 MG/24HR PTWK, Place 1 patch onto the skin once a week, Disp: 12 patch, Rfl: 3     CALCIUM PO, Take by mouth daily, Disp: , Rfl:      Cholecalciferol (VITAMIN D PO), Take by mouth daily, Disp: , Rfl:      escitalopram (LEXAPRO) 20 MG tablet, Take 1 tablet (20 mg) by mouth daily, Disp: 90 tablet, Rfl: 1     OMEPRAZOLE PO, Take 40 mg by mouth 2 times daily (before meals), Disp: ,  "Rfl:      ALLERGIES:    Allergies   Allergen Reactions     No Known Drug Allergies         SOCIAL HISTORY:   Social History     Social History     Marital status:      Spouse name: patric     Number of children: 01     Years of education: 12     Occupational History     specialist Other     [employer]     Social History Main Topics     Smoking status: Former Smoker     Packs/day: 0.25     Years: 6.00     Types: Cigarettes     Quit date: 6/16/2015     Smokeless tobacco: Never Used      Comment: social     Alcohol use Yes      Comment: rare     Drug use: No     Sexual activity: Yes     Partners: Male     Birth control/ protection: Surgical      Comment: hysterectomy 2008     Other Topics Concern      Service No     Blood Transfusions No     Caffeine Concern No     Occupational Exposure No     Hobby Hazards No     Sleep Concern No     Stress Concern Yes     Weight Concern Yes     she exercised all the time     Special Diet No     Back Care No     Exercise Yes     all the time     Bike Helmet No     Seat Belt Yes     Self-Exams No     Social History Narrative     FAMILY HISTORY:   Family History   Problem Relation Age of Onset     Breast Cancer Mother      age 42 on chemo and radiation     Gynecology Mother      endometriosis     DIABETES Maternal Grandmother      type 1     Thyroid Disease Maternal Grandmother      C.A.D. Maternal Grandmother      Cardiovascular Maternal Grandmother      HEART DISEASE Maternal Grandmother      HEART DISEASE Paternal Grandmother      DIABETES Maternal Grandfather      diabetes     Thyroid Disease Maternal Aunt      HEART DISEASE Maternal Aunt      great MGA     Gynecology Sister      endometriosis     Anesthesia Reaction No family hx of         EXAM:Vitals: Temp 97.1  F (36.2  C) (Temporal)  Ht 5' 5.25\" (1.657 m)  Wt 152 lb (68.9 kg)  LMP 09/01/2008  BMI 25.1 kg/m2  BMI= Body mass index is 25.1 kg/(m^2).    General appearance: Patient is alert and fully cooperative with " history & exam.  No sign of distress is noted during the visit.     Psychiatric: Affect is pleasant & appropriate.  Patient appears motivated to improve health.     Respiratory: Breathing is regular & unlabored while sitting.     HEENT: Hearing is intact to spoken word.  Speech is clear.  No gross evidence of visual impairment that would impact ambulation.     Vascular: DP & PT pulses are intact & regular bilaterally.  No significant edema or varicosities noted.  CFT and skin temperature is normal to both lower extremities.     Neurologic: Lower extremity sensation is intact to light touch.  No evidence of weakness or contracture in the lower extremities.  No evidence of neuropathy.    Dermatologic: Skin is intact to both lower extremities with adequate texture, turgor and tone about the integument.  No paronychia or evidence of soft tissue infection is noted.     Musculoskeletal: Patient is ambulatory without assistive device or brace.  No gross ankle deformity noted.  Fairly rectus foot type is noted with minimal valgus deformity and no medial column faulting.  Calcaneus remains rectus throughout gait.  No foot or ankle joint effusion is noted.  Very positive anterior drawer noted about the right ankle but not on the left.  I do not detect any clinical diastases about the distal tibia and fibula throughout range of motion of the ankle.  There is crepitus within the peroneal tendons on the right foot with very minimal induration. Peroneal tendons are strong.     Much diminished proprioception is noted with balance and unilateral toe raise on the right when compared to the left.  She has diminished proprioception and strength about the right ankle when compared to the left ankle and this has changed over the last several months.  Most pain and symptoms are noted about the peroneal tendons distal fibula anterior and posterior as well as a little bit about the lateral ankle gutter but not about the anterior margin of  the ankle.    There is palpable prominence with subtle subluxation of the peroneal tendons on the right only.  There appears to be slightly increased volume of the peroneal tendons with crepitus throughout range of motion and upon extreme active eversion of the right ankle crepitus is reproduced with prominence of the peroneal tendons but not complete subluxation.    Imaging: Radiographs of the right ankle demonstrate no joint space narrowing and no loose bodies or osteophytes noted.  No loss of trabeculation or osteochondral defect noted.  Rectus foot type noted.  Any widening or diastases of the syndesmosis is not clear but may be possible on these weightbearing ankle radiographs.    MRI 11/14/17: Flattening of the peroneal tendons with indistinct peroneal groove. No tendinosis. Lateral ankle ligaments are present on MRI.  No obvious change, reaction, or loss of syndesmosis noted on MRI.  No inflammatory changes are noted through the syndesmosis and minimal through the capsule.     ASSESSMENT:       ICD-10-CM    1. Right ankle instability M25.371    2. Peroneal tendonitis of right lower extremity M76.71    3. Subluxation of peroneal tendon of right foot, initial encounter S93.04XA         PLAN:  Reviewed patient's [facility] in [facility].      5/24/2017   Obtained and interpreted radiographs  This patient has ankle instability and has developed peroneal tendinitis tendinopathy to accommodate for a very positive anterior drawer unstable right ankle.   Have ordered physical therapy to improve proprioception  Recommend sturdy stable shoes written instructions dispensed  Order for custom molded orthotics to provide some stability  May also consider an ankle brace for a month or 2 or until symptoms are improving then discontinue it  Also begin oral anti-inflammatory for month or 2 or until symptoms improve  After she is established this care if her symptoms continue we will image with x-ray of the foot and ankle and MRI of the  right ankle.    Voltaren medication prescribed today is an NSAID.  If you had a recent ulcer you probably have been instructed to discontinue oral NSAIDs. Do not start this medication if you have been instructed to discontinue use of oral NSAIDs or few have an active or recently active gastric ulcer.    11/2/2017  She did not utilize the oral anti-inflammatories secondary to her gastric ulcer, second one.  Has done PT for several visits and she is doing them at home still.   She is able to perform unilateral toe raise but with challenges on the right  MRI right ankle ordered today  Follow-up after MRI    12/4/2017  Reviewed MRI findings demonstrating mild flattening of the peroneal tendons with probable low-lying muscle belly and very indistinct peroneal groove. Also ATF CF ligaments are intact however there likely elongated as she has a very positive anterior drawer when compared to the other side clinically. We discussed treatment options of continued physical therapy to improve balance strength proprioception which she is doing at home. We discussed continued orthotics and appropriate shoe gear which she is using. We discussed injections and surgical stabilization area this would likely include Arthrex ankle stabilization with scope. We also discussed peroneal regrooving. At this time she would like to return to work and activities as tolerated and follow-up to schedule reconstruction if she does not improve.    All questions were answered. Follow-up as needed.    3/21/2018  This patient would like to move forward with surgical stabilization of her ankle and peroneal tendon re-grooving.  She feels her ankle has weakened over the last year or 2 and now her peroneal tendons are becoming symptomatic and weaker more symptomatic over time.  She has attempted physical therapy, proprioception training, bracing, orthotics, anti-inflammatories, and over the last year her ankle becomes weaker and peroneal tendons more  prominent with subluxation.    We discussed treatment options and alternative treatment options as well as expectations and postoperative needs.  All questions were answered no guarantees were given or implied.  She wishes to proceed with surgical treatment in the next few weeks and we will move forward with scheduling this .    Delgado Martinez DPM          Please schedule for surgery, pre op H&P, and post ops.    Surgery:  Patient Name:  Lida Blood ([id number])  Procedure:   Right ankle stabilization with Arthrex internal brace  Drill fibula with peroneal tendon re-grooving  Peroneal tendon repair  Diagnosis:    Right ankle instability  Peroneal tendon subluxation  Peroneal tendinitis  Assistant: first assist  Surgeon:  Delgado Martinez DPM  Anesthesia:  General and Post op popliteal block  PT type:  Same Day Surgery  Time needed: 120 minutes  Patient position:  lying supine  Mini fluoro:  Yes  C-arm:  no  Equipment:  arthrex internal brace, drill with approximately 2.0 mm-4 mm bit  Anticoagulation:  No  Vendor:  Request equipment rep Ryan Tong with [employer] will be present for case, office [phone], cell [phone]  Surgeon Notes: Also concern for syndesmosis    Post op appts:    5-7 days po  12-15 days po  approx 4 weeks  approx 8 weeks  just before 12 weeks    Expected work restrictions:  strict no weight bearing in cast or splint for one month followed by one month of protected weight bearing in a fracture boot. Some restrictions for up to 12 weeks    [facility] Discussed:  Not Applicable              Again, thank you for allowing me to participate in the care of your patient.        Sincerely,        Delgado Martinez DPM

## 2018-03-28 ENCOUNTER — OFFICE VISIT (OUTPATIENT)
Dept: FAMILY MEDICINE | Facility: OTHER | Age: 33
End: 2018-03-28
Payer: COMMERCIAL

## 2018-03-28 VITALS
WEIGHT: 150 LBS | BODY MASS INDEX: 24.99 KG/M2 | HEIGHT: 65 IN | DIASTOLIC BLOOD PRESSURE: 68 MMHG | OXYGEN SATURATION: 99 % | HEART RATE: 80 BPM | RESPIRATION RATE: 16 BRPM | SYSTOLIC BLOOD PRESSURE: 100 MMHG | TEMPERATURE: 97 F

## 2018-03-28 DIAGNOSIS — E03.9 ACQUIRED HYPOTHYROIDISM: ICD-10-CM

## 2018-03-28 DIAGNOSIS — Z01.818 PREOP GENERAL PHYSICAL EXAM: Primary | ICD-10-CM

## 2018-03-28 DIAGNOSIS — M25.371 RIGHT ANKLE INSTABILITY: ICD-10-CM

## 2018-03-28 LAB
HGB BLD-MCNC: 12.4 G/DL (ref 11.7–15.7)
T4 FREE SERPL-MCNC: 0.73 NG/DL (ref 0.76–1.46)
TSH SERPL DL<=0.005 MIU/L-ACNC: 18.67 MU/L (ref 0.4–4)

## 2018-03-28 PROCEDURE — 84443 ASSAY THYROID STIM HORMONE: CPT | Performed by: NURSE PRACTITIONER

## 2018-03-28 PROCEDURE — 84439 ASSAY OF FREE THYROXINE: CPT | Performed by: NURSE PRACTITIONER

## 2018-03-28 PROCEDURE — 36415 COLL VENOUS BLD VENIPUNCTURE: CPT | Performed by: NURSE PRACTITIONER

## 2018-03-28 PROCEDURE — 99214 OFFICE O/P EST MOD 30 MIN: CPT | Performed by: NURSE PRACTITIONER

## 2018-03-28 PROCEDURE — 85018 HEMOGLOBIN: CPT | Performed by: NURSE PRACTITIONER

## 2018-03-28 ASSESSMENT — PAIN SCALES - GENERAL: PAINLEVEL: MILD PAIN (2)

## 2018-03-28 NOTE — MR AVS SNAPSHOT
After Visit Summary   3/28/2018    Lida Blood    MRN: [id number]           Patient Information     Date Of Birth          1985        Visit Information        Provider Department      3/28/2018 8:20 AM Judy Cota APRN [facility]        Today's Diagnoses     Preop general physical exam    -  1    Right ankle instability        Acquired hypothyroidism          Care Instructions    Labs will be done today.   For normal results, you will receive a letter with the results in about 2 weeks.  If anything is abnormal or unexpected, someone from the clinic will call you.      Don't take any more Ibuprofen, Aleve, Naproxen or Aspirin prior to surgery.  Tylenol and/or prescription pain pills are okay to use if needed.       Before Your Surgery      Call your surgeon if there is any change in your health. This includes signs of a cold or flu (such as a sore throat, runny nose, cough, rash or fever).    Do not smoke, drink alcohol or take over the counter medicine (unless your surgeon or primary care doctor tells you to) for the 24 hours before and after surgery.    If you take prescribed drugs: Follow your doctor s orders about which medicines to take and which to stop until after surgery.    Eating and drinking prior to surgery: follow the instructions from your surgeon    Take a shower or bath the night before surgery. Use the soap your surgeon gave you to gently clean your skin. If you do not have soap from your surgeon, use your regular soap. Do not shave or scrub the surgery site.  Wear clean pajamas and have clean sheets on your bed.           Follow-ups after your visit        Your next 10 appointments already scheduled     Apr 06, 2018   Procedure with Delgado Martinez DPM   [facility] ([facility])    [address] [place] [address] Dr Ladan AGUIRRE [address]   [phone]           From [address]: Exit at [address] on south side of  [place]. Turn right on [address]. Turn left at stoplight on [address]. [facility] will be in view two blocks ahead            Apr 11, 2018  9:15 AM CDT   [facility]    [address]  [facility] [id number] [phone]            Apr 19, 2018  9:15 AM CDT   Return Visit with Delgado Martinez DPM   [facility]    [address] [facility] [id number] [phone]              Who to contact     If you have questions or need follow up information about today's clinic visit or your schedule please contact [facility] directly at [phone].  Normal or non-critical lab and imaging results will be communicated to you by [employer], letter or phone within 4 business days after the clinic has received the results. If you do not hear from us within 7 days, please contact the clinic through [employer] or phone. If you have a critical or abnormal lab result, we will notify you by phone as soon as possible.  Submit refill requests through [employer] or call your pharmacy and they will forward the refill request to us. Please allow 3 business days for your refill to be completed.          Additional Information About Your Visit        [employer] Information     [employer] gives you secure access to your electronic health record. If you see a primary care provider, you can also send messages to your care team and make appointments. If you have questions, please call your primary care clinic.  If you do not have a primary care provider, please call [phone] and they will assist you.        Care EveryWhere ID     This is your Care EveryWhere ID. This could be used by other organizations to access your [place] medical records  [id number]        Your Vitals Were     Pulse Temperature Respirations Height Last Period Pulse Oximetry    80 97  F (36.1  C) (Tympanic) 16 5'  "5.2\" (1.656 m) 09/01/2008 99%    Breastfeeding? BMI (Body Mass Index)                No 24.81 kg/m2           Blood Pressure from Last 3 Encounters:   03/28/18 100/68   11/21/17 116/60   08/10/17 118/70    Weight from Last 3 Encounters:   03/28/18 150 lb (68 kg)   03/21/18 152 lb (68.9 kg)   12/04/17 155 lb (70.3 kg)              We Performed the Following     Hemoglobin     TSH with free T4 reflex          Today's Medication Changes          These changes are accurate as of 3/28/18  8:40 AM.  If you have any questions, ask your nurse or doctor.               These medicines have changed or have updated prescriptions.        Dose/Directions    * levothyroxine 200 MCG tablet   Commonly known as:  SYNTHROID/LEVOTHROID   This may have changed:  Another medication with the same name was added. Make sure you understand how and when to take each.   Used for:  Acquired hypothyroidism   Changed by:  Judy Cota APRN CNP        Dose:  200 mcg   Take 1 tablet (200 mcg) by mouth daily   Quantity:  30 tablet   Refills:  1       * levothyroxine 75 MCG tablet   Commonly known as:  SYNTHROID/LEVOTHROID   This may have changed:  Another medication with the same name was added. Make sure you understand how and when to take each.   Used for:  Acquired hypothyroidism   Changed by:  Judy Cota APRN CNP        Dose:  75 mcg   Take 1 tablet (75 mcg) by mouth daily Take along with 200 mcg tablet daily.   Quantity:  30 tablet   Refills:  1       * levothyroxine 25 mcg/mL Susp   Commonly known as:  SYNTHROID   This may have changed:  You were already taking a medication with the same name, and this prescription was added. Make sure you understand how and when to take each.   Used for:  Acquired hypothyroidism   Changed by:  Judy Cota APRN CNP        Dose:  275 mcg   Take 11 mLs (275 mcg) by mouth every morning (before breakfast)   Quantity:  330 mL   Refills:  1       * Notice:  This list has 3 medication(s) that are " the same as other medications prescribed for you. Read the directions carefully, and ask your doctor or other care provider to review them with you.         Where to get your medicines      These medications were sent to [facility] - [place], [place] - [address]  [address] , [facility] [address]     Phone:  [phone]     levothyroxine 25 mcg/mL Susp                Primary Care Provider Office Phone # Fax #    Joaquin Dudley [phone]       [address] [facility] [address]        Equal Access to Services     LOLITA MORA : Hadii aad ku hadasho Soomaali, waaxda luqadaha, qaybta kaalmada adeegyada, garcía cee . So [facility] [phone].    ATENCIÓN: Si habla español, tiene a goldsmith disposición servicios gratuitos de asistencia lingüística. Llame al [phone].    We comply with applicable federal civil rights laws and [place] laws. We do not discriminate on the basis of race, color, national origin, age, disability, sex, sexual orientation, or gender identity.            Thank you!     Thank you for choosing [facility]  for your care. Our goal is always to provide you with excellent care. Hearing back from our patients is one way we can continue to improve our services. Please take a few minutes to complete the written survey that you may receive in the mail after your visit with us. Thank you!             Your Updated Medication List - Protect others around you: Learn how to safely use, store and throw away your medicines at www.disposemymeds.org.          This list is accurate as of 3/28/18  8:40 AM.  Always use your most recent med list.                   Brand Name Dispense Instructions for use Diagnosis    CALCIUM PO      Take by mouth daily        escitalopram 20 MG tablet    LEXAPRO    90 tablet    Take 1 tablet (20 mg) by mouth daily    Major depressive disorder, single episode, moderate (H)       estradiol 0.075 MG/24HR Ptwk     12 patch     Place 1 patch onto the skin once a week    Symptomatic menopausal or female climacteric states       * levothyroxine 200 MCG tablet    SYNTHROID/LEVOTHROID    30 tablet    Take 1 tablet (200 mcg) by mouth daily    Acquired hypothyroidism       * levothyroxine 75 MCG tablet    SYNTHROID/LEVOTHROID    30 tablet    Take 1 tablet (75 mcg) by mouth daily Take along with 200 mcg tablet daily.    Acquired hypothyroidism       * levothyroxine 25 mcg/mL Susp    SYNTHROID    330 mL    Take 11 mLs (275 mcg) by mouth every morning (before breakfast)    Acquired hypothyroidism       OMEPRAZOLE PO      Take 40 mg by mouth 2 times daily (before meals)        VITAMIN D PO      Take by mouth daily        * Notice:  This list has 3 medication(s) that are the same as other medications prescribed for you. Read the directions carefully, and ask your doctor or other care provider to review them with you.

## 2018-03-28 NOTE — PATIENT INSTRUCTIONS
Labs will be done today.   For normal results, you will receive a letter with the results in about 2 weeks.  If anything is abnormal or unexpected, someone from the clinic will call you.      Don't take any more Ibuprofen, Aleve, Naproxen or Aspirin prior to surgery.  Tylenol and/or prescription pain pills are okay to use if needed.       Before Your Surgery      Call your surgeon if there is any change in your health. This includes signs of a cold or flu (such as a sore throat, runny nose, cough, rash or fever).    Do not smoke, drink alcohol or take over the counter medicine (unless your surgeon or primary care doctor tells you to) for the 24 hours before and after surgery.    If you take prescribed drugs: Follow your doctor s orders about which medicines to take and which to stop until after surgery.    Eating and drinking prior to surgery: follow the instructions from your surgeon    Take a shower or bath the night before surgery. Use the soap your surgeon gave you to gently clean your skin. If you do not have soap from your surgeon, use your regular soap. Do not shave or scrub the surgery site.  Wear clean pajamas and have clean sheets on your bed.

## 2018-03-28 NOTE — PROGRESS NOTES
[facility]  [address] [facility] [address]  [phone]  Dept: [phone]    PRE-OP EVALUATION:  Today's date: 3/28/2018    Lida Blood (: 1985) presents for pre-operative evaluation assessment as requested by Dr. Martinez.  She requires evaluation and anesthesia risk assessment prior to undergoing surgery/procedure for treatment of ankle pain .    Fax number for surgical facility: [facility]  Primary Physician: Joaquin Dudley  Type of Anesthesia Anticipated: General    Patient has a Health Care Directive or Living Will:  NO    Preop Questions 3/28/2018   Who is doing your surgery? dr martinez   What are you having done? ankle stabilization   Date of Surgery/Procedure: 18   Facility or Hospital where procedure/surgery will be performed: [facility]   1.  Do you have a history of Heart attack, stroke, stent, coronary bypass surgery, or other heart surgery? No   2.  Do you ever have any pain or discomfort in your chest? No   3.  Do you have a history of  Heart Failure? No   4.   Are you troubled by shortness of breath when:  walking on a level surface, or up a slight hill, or at night? No   5.  Do you currently have a cold, bronchitis or other respiratory infection? No   6.  Do you have a cough, shortness of breath, or wheezing? No   7.  Do you sometimes get pains in the calves of your legs when you walk? No   8. Do you or anyone in your family have previous history of blood clots? No   9.  Do you or does anyone in your family have a serious bleeding problem such as prolonged bleeding following surgeries or cuts? No   10. Have you ever had problems with anemia or been told to take iron pills? No   11. Have you had any abnormal blood loss such as black, tarry or bloody stools, or abnormal vaginal bleeding? No   12. Have you ever had a blood transfusion? No   13. Have you or any of your relatives ever had problems with anesthesia? No   14. Do you have  sleep apnea, excessive snoring or daytime drowsiness? No   15. Do you have any prosthetic heart valves? No   16. Do you have prosthetic joints? No   17. Is there any chance that you may be pregnant? No         HPI:     HPI related to upcoming procedure: right ankle instability       See problem list for active medical problems.  Problems all longstanding and stable, except as noted/documented.  See ROS for pertinent symptoms related to these conditions.                                                                                                  .    MEDICAL HISTORY:     Patient Active Problem List    Diagnosis Date Noted     Abdominal pain 05/24/2013     Priority: High     Vomiting and diarrhea 05/23/2013     Priority: High     Abdominal pain, acute, epigastric 04/23/2012     Priority: High     S/P gastric bypass 08/10/2017     Priority: Medium     LUQ abdominal pain 08/16/2016     Priority: Medium     Ileus (H) 08/16/2016     Priority: Medium     Dehydration 03/31/2016     Priority: Medium     Obesity 03/15/2016     Priority: Medium     Gastric ulcer 12/02/2015     Priority: Medium     Hypokalemia 12/01/2015     Priority: Medium     Hypoglycemia 12/01/2015     Priority: Medium     Gastroesophageal reflux disease without esophagitis 10/16/2015     Priority: Medium     RLS (restless legs syndrome) 04/05/2013     Priority: Medium     Migraine headache 10/14/2011     Priority: Medium     Physiological ovarian cysts 04/28/2011     Priority: Medium     CARDIOVASCULAR SCREENING; LDL GOAL LESS THAN 160 10/31/2010     Priority: Medium     Vaginal discharge 07/15/2009     Priority: Medium     Surgery follow-up examination 07/15/2009     Priority: Medium     Endometriosis 10/24/2008     Priority: Medium     Major depressive disorder, single episode, moderate (H)      Priority: Medium     Backache 01/22/2004     Priority: Medium     Problem list name updated by automated process. Provider to review       Hypothyroidism  09/26/2003     Priority: Medium     Problem list name updated by automated process. Provider to review       Anxiety state 05/08/2002     Priority: Medium     Problem list name updated by automated process. Provider to review        Past Medical History:   Diagnosis Date     Endometriosis of pelvic peritoneum 10/06/08     Endometriosis of uterus 10/06/08    Admit. 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    s/p radioactive iodine ablation Rx     Major depressive disorder, single episode, moderate (H)      Migraine headache 10/14/2011     Obesity (BMI 30-39.9)      Other and unspecified ovarian cyst 03/02/10    d/c 03/04/10     Pain pelvic 12/14/04    Discharged 12/17/04-[facility]     PONV (postoperative nausea and vomiting)      S/P hysterectomy      Thyrotoxicosis without mention of goiter or other cause, without mention of thyrotoxic crisis or storm 2001     trachelectomy 05/19/09     Past Surgical History:   Procedure Laterality Date     C APPENDECTOMY  4/18/2003     C REMOVAL OF CERVIX  05/29/09    laparoscopic assisted vaginal trachelectomy     C SUPRACERV ABD HYSTERECTOMY  10/06/08     COLONOSCOPY  11/25/14     ESOPHAGOSCOPY, GASTROSCOPY, DUODENOSCOPY (EGD), COMBINED N/A 8/31/2015    Procedure: COMBINED ESOPHAGOSCOPY, GASTROSCOPY, DUODENOSCOPY (EGD), BIOPSY SINGLE OR MULTIPLE;  Surgeon: Toni Wiggins MD;  Location:  GI     ESOPHAGOSCOPY, GASTROSCOPY, DUODENOSCOPY (EGD), COMBINED N/A 8/18/2016    Procedure: COMBINED ESOPHAGOSCOPY, GASTROSCOPY, DUODENOSCOPY (EGD);  Surgeon: Jaswinder Waddell MD;  Location:  GI     ESOPHAGOSCOPY, GASTROSCOPY, DUODENOSCOPY (EGD), COMBINED N/A 3/1/2017    Procedure: COMBINED ESOPHAGOSCOPY, GASTROSCOPY, DUODENOSCOPY (EGD), BIOPSY SINGLE OR MULTIPLE;  Surgeon: Jan Johnston MD;  Location:  GI     ESOPHAGOSCOPY, GASTROSCOPY, DUODENOSCOPY (EGD), DILATATION, COMBINED  N/A 4/13/2016    Procedure: COMBINED ESOPHAGOSCOPY, GASTROSCOPY, DUODENOSCOPY (EGD), DILATATION;  Surgeon: Jos Dinero MD;  Location:  GI     EYE SURGERY      bilat orbital decompression surgery       COLONOSCOPY W BIOPSY  1/22/2007     HC COLONOSCOPY W/WO BRUSH/WASH  12/16/04     HC CREATE EARDRUM OPENING,GEN ANESTH  12/29/88    Bilateral myringotomy with insertion of PE tubes     HC CREATE EARDRUM OPENING,GEN ANESTH  12/20/90    Bilateral myringotomy with insertion of PE tubes     HC LAP,FULGURATE/EXCISE LESIONS  06/16/08    Laparoscopy, ablation of the endometriosis and diagnostic cystoscopy     HC LAPAROSCOPY, SURGICAL, ABDOMEN, PERITONEUM & OMENTUM; DX W/ OR W/O SPECIMEN(S)  04/18/2003    Diagnostic laparoscopy     HC REMOVAL GALLBLADDER  8/1/03     HC TYMPANOPLASTY W/O MASTOID, INIT/REV W/O OSS CHAIN RECONST  10/03/00    Left exploratory tympanoplasty, myringoplasty, fascia grafting of the oval window, microdissection via the use of operative microscope      UGI ENDOSCOPY, SIMPLE EXAM  3/23/12, 11/13/14     HERNIA REPAIR, INCISIONAL  08/23/2006    Laparoscopic mesh repair.     HYSTERECTOMY, PAP NO LONGER INDICATED  5/29/09     HYSTEROSCOPY  02/08/08     LAPAROSCOPIC GASTRECTOMY N/A 3/15/2016    Procedure: LAPAROSCOPIC GASTRECTOMY;  Surgeon: Jos Dinero MD;  Location:  OR     LAPAROSCOPIC LYSIS ADHESIONS  11/02/10    [facility]     LAPAROSCOPIC REVISION GASTROPLASTY TO ELBERT-EN-Y N/A 10/26/2015    Procedure: LAPAROSCOPIC REVISION GASTROPLASTY TO ELBERT-EN-Y;  Surgeon: Toni Wiggins MD;  Location:  OR     LAPAROSCOPIC SALPINGO-OOPHORECTOMY Left 3/2015     LAPAROSCOPY DIAGNOSTIC (GENERAL) Right 06/23/11    Evaluation of abdomen/pelvis, RSO, Cysto; [facility]     Current Outpatient Prescriptions   Medication Sig Dispense Refill     levothyroxine (SYNTHROID/LEVOTHROID) 200 MCG tablet Take 1 tablet (200 mcg) by mouth daily 30 tablet 1     levothyroxine  "(SYNTHROID/LEVOTHROID) 75 MCG tablet Take 1 tablet (75 mcg) by mouth daily Take along with 200 mcg tablet daily. 30 tablet 1     estradiol 0.075 MG/24HR PTWK Place 1 patch onto the skin once a week 12 patch 3     OMEPRAZOLE PO Take 40 mg by mouth 2 times daily (before meals)       CALCIUM PO Take by mouth daily       Cholecalciferol (VITAMIN D PO) Take by mouth daily       escitalopram (LEXAPRO) 20 MG tablet Take 1 tablet (20 mg) by mouth daily 90 tablet 1     OTC products: as noted    Allergies   Allergen Reactions     No Known Drug Allergies       Latex Allergy: NO    Social History   Substance Use Topics     Smoking status: Former Smoker     Packs/day: 0.25     Years: 6.00     Types: Cigarettes     Quit date: 6/16/2015     Smokeless tobacco: Never Used      Comment: social     Alcohol use Yes      Comment: rare     History   Drug Use No       REVIEW OF SYSTEMS:   CONSTITUTIONAL: NEGATIVE for fever, chills, change in weight  INTEGUMENTARY/SKIN: NEGATIVE for worrisome rashes, moles or lesions  EYES: NEGATIVE for vision changes or irritation  ENT/MOUTH: NEGATIVE for ear, mouth and throat problems  RESP: NEGATIVE for significant cough or SOB  BREAST: NEGATIVE for masses, tenderness or discharge  CV: NEGATIVE for chest pain, palpitations or peripheral edema  GI: NEGATIVE for nausea, abdominal pain, heartburn, or change in bowel habits  : NEGATIVE for frequency, dysuria, or hematuria  MUSCULOSKELETAL: NEGATIVE for significant arthralgias or myalgia  NEURO: NEGATIVE for weakness, dizziness or paresthesias  ENDOCRINE: NEGATIVE for temperature intolerance, skin/hair changes  HEME: NEGATIVE for bleeding problems  PSYCHIATRIC: NEGATIVE for changes in mood or affect    EXAM:   /68  Pulse 80  Temp 97  F (36.1  C) (Tympanic)  Resp 16  Ht 5' 5.2\" (1.656 m)  Wt 150 lb (68 kg)  LMP 09/01/2008  SpO2 99%  Breastfeeding? No  BMI 24.81 kg/m2    GENERAL APPEARANCE: healthy, alert and no distress     EYES: EOMI, " PERRL     HENT: ear canals and TM's normal and nose and mouth without ulcers or lesions     NECK: no adenopathy, no asymmetry, masses, or scars and thyroid normal to palpation     RESP: lungs clear to auscultation - no rales, rhonchi or wheezes     CV: regular rates and rhythm, normal S1 S2, no S3 or S4 and no murmur, click or rub     ABDOMEN:  soft, nontender, no HSM or masses and bowel sounds normal     MS: extremities normal- no gross deformities noted, no evidence of inflammation in joints, FROM in all extremities.     SKIN: no suspicious lesions or rashes     NEURO: Normal strength and tone, sensory exam grossly normal, mentation intact and speech normal     PSYCH: mentation appears normal. and affect normal/bright     LYMPHATICS: No cervical adenopathy    DIAGNOSTICS:     Office Visit on 03/28/2018   Component Date Value Ref Range Status     Hemoglobin 03/28/2018 12.4  11.7 - 15.7 g/dL Final     TSH 03/28/2018 18.67* 0.40 - 4.00 mU/L Final     T4 Free 03/28/2018 0.73* 0.76 - 1.46 ng/dL Final           Recent Labs   Lab Test  11/21/17   0813  07/17/17   1225   03/27/17   0613   03/29/16   1352  12/10/13   HGB  12.4  12.4   < >  Unsatisfactory specimen - clotted  JOHN PADRON,0637 03/27/17 EAB     < >  12.6   < >   --    PLT  333  276   < >  Unsatisfactory specimen - clotted  JOHN PADRON,0637 03/27/17 EAB     < >  436   < >   --    INR   --    --    --   0.98   --   0.95   < >   --    NA   --   141   --   138   < >  140   < >   --    POTASSIUM   --   4.0   --   4.6   < >  3.9   < >   --    CR   --   0.84   --   0.92   < >  0.69   < >   --    A1C   --    --    --    --    --    --    --   5.2    < > = values in this interval not displayed.        IMPRESSION:   Reason for surgery/procedure: right ankle instability     The proposed surgical procedure is considered INTERMEDIATE risk.    REVISED CARDIAC RISK INDEX  The patient has the following serious cardiovascular risks for perioperative complications such as  (MI, PE, VFib and 3  AV Block):  No serious cardiac risks  INTERPRETATION: 0 risks: Class I (very low risk - 0.4% complication rate)    The patient has the following additional risks for perioperative complications:  No identified additional risks      ICD-10-CM    1. Preop general physical exam Z01.818 Hemoglobin   2. Right ankle instability M25.371 Hemoglobin   3. Acquired hypothyroidism E03.9 TSH with free T4 reflex     levothyroxine (SYNTHROID) 25 mcg/mL SUSP       RECOMMENDATIONS:       --Patient is to take all scheduled medications on the day of surgery EXCEPT for modifications listed below.    APPROVAL GIVEN to proceed with proposed procedure, without further diagnostic evaluation     She has a history of Arely-n-y procedure and has had difficulty maintaining her TSH within normal range despite escalating doses of Levothyroxine.  Will try oral solution and see if that is absorbed better.  She will need repeat lab testing in 6 weeks.     Signed Electronically by: KATE Norman CNP    Copy of this evaluation report is provided to requesting physician.    [facility] Preop Guidelines

## 2018-03-28 NOTE — NURSING NOTE
"Chief Complaint   Patient presents with     Pre-Op Exam     dos: 4/6/18 Dr. Martinez       Initial /68  Pulse 80  Temp 97  F (36.1  C) (Tympanic)  Resp 16  Ht 5' 5.2\" (1.656 m)  Wt 150 lb (68 kg)  LMP 09/01/2008  SpO2 99%  Breastfeeding? No  BMI 24.81 kg/m2 Estimated body mass index is 24.81 kg/(m^2) as calculated from the following:    Height as of this encounter: 5' 5.2\" (1.656 m).    Weight as of this encounter: 150 lb (68 kg).  Medication Reconciliation: complete   ................Tj Gomez LPN,   March 28, 2018,      8:28 AM,   [facility]    "

## 2018-03-29 NOTE — PROGRESS NOTES
Pt notified of lab results/recommendations via [employer] msg.  ................Tj Gomez LPN,   March 29, 2018,      7:56 AM,   [facility]

## 2018-03-30 ENCOUNTER — TELEPHONE (OUTPATIENT)
Dept: FAMILY MEDICINE | Facility: OTHER | Age: 33
End: 2018-03-30

## 2018-03-30 NOTE — TELEPHONE ENCOUNTER
Afsaneh Roth  [facility]    [facility]  [address] [place] [address]   [email or web address]  [email or web address]   Phone: [phone]  Fax: [phone]

## 2018-03-30 NOTE — TELEPHONE ENCOUNTER
A prior authorization is needed for the following medication prescribed.  Please complete a prior authorization with the information included below.    Medication:Levothyroxine 25mcg/ml Suspension                        Ingredients:                              NDC:  Levothyroxine 300mcg tabs    [address]-23  Sterile Water for Irrigation Sol  [address]-22  Sorbitol 70% Soln   [address]-22        RX #:[id number]  Reason for Rejection:COMPOUND CLAIM CONTAINED AT LEAST ONE NO  N-COVERED INGREDIENT    Pharmacy Insurance plan:[employer]   BIN #:[id number]  ID #: [id number]  PCN #:N/A  Phone #:[phone]      Pharmacy NPI:[id number]      Please advise the pharmacy when the prior authorization is approved or denied.     Thank you for your time.

## 2018-04-02 NOTE — TELEPHONE ENCOUNTER
Please see note from Pharmacist/Pharmacy with information regarding prior authorization.    Glendy Fitzgerald XRO/  [facility]

## 2018-04-03 ENCOUNTER — TELEPHONE (OUTPATIENT)
Dept: PODIATRY | Facility: CLINIC | Age: 33
End: 2018-04-03

## 2018-04-03 NOTE — TELEPHONE ENCOUNTER
Central Prior Authorization Team   Phone: [phone]    PA Initiation    Medication: Levothyroxine 25mcg/ml  Insurance Company:    Pharmacy Filling the Rx: [facility]  [place] - [address]  Filling Pharmacy Phone: [phone]  Filling Pharmacy Fax:    Start Date: 4/3/2018

## 2018-04-04 NOTE — TELEPHONE ENCOUNTER
PRIOR AUTHORIZATION DENIED    Medication: Levothyroxine 25mcg/ml    Denial Date: 4/4/2018    Denial Rational: Called and spoke with insurance who stated that 6 of the ingredients being billed in the compound are not covered by insurance and were denied.          Appeal Information: According to insurance in order to appeal we would need a letter of medical necessity written for each of the 6 ingredients that are not covered by insurance.

## 2018-04-05 NOTE — H&P (VIEW-ONLY)
[facility]  [address] [facility] [address]  [phone]  Dept: [phone]    PRE-OP EVALUATION:  Today's date: 3/28/2018    Lida Blood (: 1985) presents for pre-operative evaluation assessment as requested by Dr. Martinez.  She requires evaluation and anesthesia risk assessment prior to undergoing surgery/procedure for treatment of ankle pain .    Fax number for surgical facility: [email or web address]  Primary Physician: Joaquin Dudley  Type of Anesthesia Anticipated: General    Patient has a Health Care Directive or Living Will:  NO    Preop Questions 3/28/2018   Who is doing your surgery? dr martinez   What are you having done? ankle stabilization   Date of Surgery/Procedure: 18   Facility or Hospital where procedure/surgery will be performed: [facility]   1.  Do you have a history of Heart attack, stroke, stent, coronary bypass surgery, or other heart surgery? No   2.  Do you ever have any pain or discomfort in your chest? No   3.  Do you have a history of  Heart Failure? No   4.   Are you troubled by shortness of breath when:  walking on a level surface, or up a slight hill, or at night? No   5.  Do you currently have a cold, bronchitis or other respiratory infection? No   6.  Do you have a cough, shortness of breath, or wheezing? No   7.  Do you sometimes get pains in the calves of your legs when you walk? No   8. Do you or anyone in your family have previous history of blood clots? No   9.  Do you or does anyone in your family have a serious bleeding problem such as prolonged bleeding following surgeries or cuts? No   10. Have you ever had problems with anemia or been told to take iron pills? No   11. Have you had any abnormal blood loss such as black, tarry or bloody stools, or abnormal vaginal bleeding? No   12. Have you ever had a blood transfusion? No   13. Have you or any of your relatives ever had problems with anesthesia? No   14. Do you have  sleep apnea, excessive snoring or daytime drowsiness? No   15. Do you have any prosthetic heart valves? No   16. Do you have prosthetic joints? No   17. Is there any chance that you may be pregnant? No         HPI:     HPI related to upcoming procedure: right ankle instability       See problem list for active medical problems.  Problems all longstanding and stable, except as noted/documented.  See ROS for pertinent symptoms related to these conditions.                                                                                                  .    MEDICAL HISTORY:     Patient Active Problem List    Diagnosis Date Noted     Abdominal pain 05/24/2013     Priority: High     Vomiting and diarrhea 05/23/2013     Priority: High     Abdominal pain, acute, epigastric 04/23/2012     Priority: High     S/P gastric bypass 08/10/2017     Priority: Medium     LUQ abdominal pain 08/16/2016     Priority: Medium     Ileus (H) 08/16/2016     Priority: Medium     Dehydration 03/31/2016     Priority: Medium     Obesity 03/15/2016     Priority: Medium     Gastric ulcer 12/02/2015     Priority: Medium     Hypokalemia 12/01/2015     Priority: Medium     Hypoglycemia 12/01/2015     Priority: Medium     Gastroesophageal reflux disease without esophagitis 10/16/2015     Priority: Medium     RLS (restless legs syndrome) 04/05/2013     Priority: Medium     Migraine headache 10/14/2011     Priority: Medium     Physiological ovarian cysts 04/28/2011     Priority: Medium     CARDIOVASCULAR SCREENING; LDL GOAL LESS THAN 160 10/31/2010     Priority: Medium     Vaginal discharge 07/15/2009     Priority: Medium     Surgery follow-up examination 07/15/2009     Priority: Medium     Endometriosis 10/24/2008     Priority: Medium     Major depressive disorder, single episode, moderate (H)      Priority: Medium     Backache 01/22/2004     Priority: Medium     Problem list name updated by automated process. Provider to review       Hypothyroidism  09/26/2003     Priority: Medium     Problem list name updated by automated process. Provider to review       Anxiety state 05/08/2002     Priority: Medium     Problem list name updated by automated process. Provider to review        Past Medical History:   Diagnosis Date     Endometriosis of pelvic peritoneum 10/06/08     Endometriosis of uterus 10/06/08    Admit. 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    s/p radioactive iodine ablation Rx     Major depressive disorder, single episode, moderate (H)      Migraine headache 10/14/2011     Obesity (BMI 30-39.9)      Other and unspecified ovarian cyst 03/02/10    d/c 03/04/10     Pain pelvic 12/14/04    Discharged 12/17/04-[facility]     PONV (postoperative nausea and vomiting)      S/P hysterectomy      Thyrotoxicosis without mention of goiter or other cause, without mention of thyrotoxic crisis or storm 2001     trachelectomy 05/19/09     Past Surgical History:   Procedure Laterality Date     C APPENDECTOMY  4/18/2003     C REMOVAL OF CERVIX  05/29/09    laparoscopic assisted vaginal trachelectomy     C SUPRACERV ABD HYSTERECTOMY  10/06/08     COLONOSCOPY  11/25/14     ESOPHAGOSCOPY, GASTROSCOPY, DUODENOSCOPY (EGD), COMBINED N/A 8/31/2015    Procedure: COMBINED ESOPHAGOSCOPY, GASTROSCOPY, DUODENOSCOPY (EGD), BIOPSY SINGLE OR MULTIPLE;  Surgeon: Toni Wiggins MD;  Location:  GI     ESOPHAGOSCOPY, GASTROSCOPY, DUODENOSCOPY (EGD), COMBINED N/A 8/18/2016    Procedure: COMBINED ESOPHAGOSCOPY, GASTROSCOPY, DUODENOSCOPY (EGD);  Surgeon: Jaswinder Waddell MD;  Location:  GI     ESOPHAGOSCOPY, GASTROSCOPY, DUODENOSCOPY (EGD), COMBINED N/A 3/1/2017    Procedure: COMBINED ESOPHAGOSCOPY, GASTROSCOPY, DUODENOSCOPY (EGD), BIOPSY SINGLE OR MULTIPLE;  Surgeon: Jan Johnston MD;  Location:  GI     ESOPHAGOSCOPY, GASTROSCOPY, DUODENOSCOPY (EGD), DILATATION, COMBINED  N/A 4/13/2016    Procedure: COMBINED ESOPHAGOSCOPY, GASTROSCOPY, DUODENOSCOPY (EGD), DILATATION;  Surgeon: Jos Dinero MD;  Location:  GI     EYE SURGERY      bilat orbital decompression surgery       COLONOSCOPY W BIOPSY  1/22/2007     HC COLONOSCOPY W/WO BRUSH/WASH  12/16/04     HC CREATE EARDRUM OPENING,GEN ANESTH  12/29/88    Bilateral myringotomy with insertion of PE tubes     HC CREATE EARDRUM OPENING,GEN ANESTH  12/20/90    Bilateral myringotomy with insertion of PE tubes     HC LAP,FULGURATE/EXCISE LESIONS  06/16/08    Laparoscopy, ablation of the endometriosis and diagnostic cystoscopy     HC LAPAROSCOPY, SURGICAL, ABDOMEN, PERITONEUM & OMENTUM; DX W/ OR W/O SPECIMEN(S)  04/18/2003    Diagnostic laparoscopy     HC REMOVAL GALLBLADDER  8/1/03     HC TYMPANOPLASTY W/O MASTOID, INIT/REV W/O OSS CHAIN RECONST  10/03/00    Left exploratory tympanoplasty, myringoplasty, fascia grafting of the oval window, microdissection via the use of operative microscope      UGI ENDOSCOPY, SIMPLE EXAM  3/23/12, 11/13/14     HERNIA REPAIR, INCISIONAL  08/23/2006    Laparoscopic mesh repair.     HYSTERECTOMY, PAP NO LONGER INDICATED  5/29/09     HYSTEROSCOPY  02/08/08     LAPAROSCOPIC GASTRECTOMY N/A 3/15/2016    Procedure: LAPAROSCOPIC GASTRECTOMY;  Surgeon: Jos Dinero MD;  Location:  OR     LAPAROSCOPIC LYSIS ADHESIONS  11/02/10    [facility]     LAPAROSCOPIC REVISION GASTROPLASTY TO ELBERT-EN-Y N/A 10/26/2015    Procedure: LAPAROSCOPIC REVISION GASTROPLASTY TO ELBERT-EN-Y;  Surgeon: Toni Wiggins MD;  Location:  OR     LAPAROSCOPIC SALPINGO-OOPHORECTOMY Left 3/2015     LAPAROSCOPY DIAGNOSTIC (GENERAL) Right 06/23/11    Evaluation of abdomen/pelvis, RSO, Cysto; [facility]     Current Outpatient Prescriptions   Medication Sig Dispense Refill     levothyroxine (SYNTHROID/LEVOTHROID) 200 MCG tablet Take 1 tablet (200 mcg) by mouth daily 30 tablet 1     levothyroxine  "(SYNTHROID/LEVOTHROID) 75 MCG tablet Take 1 tablet (75 mcg) by mouth daily Take along with 200 mcg tablet daily. 30 tablet 1     estradiol 0.075 MG/24HR PTWK Place 1 patch onto the skin once a week 12 patch 3     OMEPRAZOLE PO Take 40 mg by mouth 2 times daily (before meals)       CALCIUM PO Take by mouth daily       Cholecalciferol (VITAMIN D PO) Take by mouth daily       escitalopram (LEXAPRO) 20 MG tablet Take 1 tablet (20 mg) by mouth daily 90 tablet 1     OTC products: as noted    Allergies   Allergen Reactions     No Known Drug Allergies       Latex Allergy: NO    Social History   Substance Use Topics     Smoking status: Former Smoker     Packs/day: 0.25     Years: 6.00     Types: Cigarettes     Quit date: 6/16/2015     Smokeless tobacco: Never Used      Comment: social     Alcohol use Yes      Comment: rare     History   Drug Use No       REVIEW OF SYSTEMS:   CONSTITUTIONAL: NEGATIVE for fever, chills, change in weight  INTEGUMENTARY/SKIN: NEGATIVE for worrisome rashes, moles or lesions  EYES: NEGATIVE for vision changes or irritation  ENT/MOUTH: NEGATIVE for ear, mouth and throat problems  RESP: NEGATIVE for significant cough or SOB  BREAST: NEGATIVE for masses, tenderness or discharge  CV: NEGATIVE for chest pain, palpitations or peripheral edema  GI: NEGATIVE for nausea, abdominal pain, heartburn, or change in bowel habits  : NEGATIVE for frequency, dysuria, or hematuria  MUSCULOSKELETAL: NEGATIVE for significant arthralgias or myalgia  NEURO: NEGATIVE for weakness, dizziness or paresthesias  ENDOCRINE: NEGATIVE for temperature intolerance, skin/hair changes  HEME: NEGATIVE for bleeding problems  PSYCHIATRIC: NEGATIVE for changes in mood or affect    EXAM:   /68  Pulse 80  Temp 97  F (36.1  C) (Tympanic)  Resp 16  Ht 5' 5.2\" (1.656 m)  Wt 150 lb (68 kg)  LMP 09/01/2008  SpO2 99%  Breastfeeding? No  BMI 24.81 kg/m2    GENERAL APPEARANCE: healthy, alert and no distress     EYES: EOMI, " PERRL     HENT: ear canals and TM's normal and nose and mouth without ulcers or lesions     NECK: no adenopathy, no asymmetry, masses, or scars and thyroid normal to palpation     RESP: lungs clear to auscultation - no rales, rhonchi or wheezes     CV: regular rates and rhythm, normal S1 S2, no S3 or S4 and no murmur, click or rub     ABDOMEN:  soft, nontender, no HSM or masses and bowel sounds normal     MS: extremities normal- no gross deformities noted, no evidence of inflammation in joints, FROM in all extremities.     SKIN: no suspicious lesions or rashes     NEURO: Normal strength and tone, sensory exam grossly normal, mentation intact and speech normal     PSYCH: mentation appears normal. and affect normal/bright     LYMPHATICS: No cervical adenopathy    DIAGNOSTICS:     Office Visit on 03/28/2018   Component Date Value Ref Range Status     Hemoglobin 03/28/2018 12.4  11.7 - 15.7 g/dL Final     TSH 03/28/2018 18.67* 0.40 - 4.00 mU/L Final     T4 Free 03/28/2018 0.73* 0.76 - 1.46 ng/dL Final           Recent Labs   Lab Test  11/21/17   0813  07/17/17   1225   03/27/17   0613   03/29/16   1352  12/10/13   HGB  12.4  12.4   < >  Unsatisfactory specimen - clotted  JOHN PADRON,0637 03/27/17 EAB     < >  12.6   < >   --    PLT  333  276   < >  Unsatisfactory specimen - clotted  JOHN PADRON,0637 03/27/17 EAB     < >  436   < >   --    INR   --    --    --   0.98   --   0.95   < >   --    NA   --   141   --   138   < >  140   < >   --    POTASSIUM   --   4.0   --   4.6   < >  3.9   < >   --    CR   --   0.84   --   0.92   < >  0.69   < >   --    A1C   --    --    --    --    --    --    --   5.2    < > = values in this interval not displayed.        IMPRESSION:   Reason for surgery/procedure: right ankle instability     The proposed surgical procedure is considered INTERMEDIATE risk.    REVISED CARDIAC RISK INDEX  The patient has the following serious cardiovascular risks for perioperative complications such as  (MI, PE, VFib and 3  AV Block):  No serious cardiac risks  INTERPRETATION: 0 risks: Class I (very low risk - 0.4% complication rate)    The patient has the following additional risks for perioperative complications:  No identified additional risks      ICD-10-CM    1. Preop general physical exam Z01.818 Hemoglobin   2. Right ankle instability M25.371 Hemoglobin   3. Acquired hypothyroidism E03.9 TSH with free T4 reflex     levothyroxine (SYNTHROID) 25 mcg/mL SUSP       RECOMMENDATIONS:       --Patient is to take all scheduled medications on the day of surgery EXCEPT for modifications listed below.    APPROVAL GIVEN to proceed with proposed procedure, without further diagnostic evaluation     She has a history of Arely-n-y procedure and has had difficulty maintaining her TSH within normal range despite escalating doses of Levothyroxine.  Will try oral solution and see if that is absorbed better.  She will need repeat lab testing in 6 weeks.     Signed Electronically by: KATE Norman CNP    Copy of this evaluation report is provided to requesting physician.    [facility] Preop Guidelines

## 2018-04-06 ENCOUNTER — HOSPITAL ENCOUNTER (OUTPATIENT)
Facility: CLINIC | Age: 33
Discharge: HOME | End: 2018-04-06
Attending: PODIATRIST | Admitting: PODIATRIST
Payer: COMMERCIAL

## 2018-04-06 ENCOUNTER — ANESTHESIA EVENT (OUTPATIENT)
Dept: SURGERY | Facility: CLINIC | Age: 33
End: 2018-04-06
Payer: COMMERCIAL

## 2018-04-06 ENCOUNTER — ANESTHESIA (OUTPATIENT)
Dept: SURGERY | Facility: CLINIC | Age: 33
End: 2018-04-06
Payer: COMMERCIAL

## 2018-04-06 ENCOUNTER — SURGERY (OUTPATIENT)
Age: 33
End: 2018-04-06

## 2018-04-06 ENCOUNTER — APPOINTMENT (OUTPATIENT)
Dept: GENERAL RADIOLOGY | Facility: CLINIC | Age: 33
End: 2018-04-06
Attending: PODIATRIST
Payer: COMMERCIAL

## 2018-04-06 VITALS
DIASTOLIC BLOOD PRESSURE: 65 MMHG | SYSTOLIC BLOOD PRESSURE: 114 MMHG | TEMPERATURE: 98.3 F | HEART RATE: 82 BPM | RESPIRATION RATE: 16 BRPM | OXYGEN SATURATION: 97 %

## 2018-04-06 DIAGNOSIS — M76.71 PERONEAL TENDINITIS OF RIGHT LOWER EXTREMITY: ICD-10-CM

## 2018-04-06 DIAGNOSIS — M25.371 RIGHT ANKLE INSTABILITY: Primary | ICD-10-CM

## 2018-04-06 PROCEDURE — 25000125 ZZHC RX 250: Performed by: PODIATRIST

## 2018-04-06 PROCEDURE — 36000058 ZZH SURGERY LEVEL 3 EA 15 ADDTL MIN: Performed by: PODIATRIST

## 2018-04-06 PROCEDURE — 40000278 XR SURGERY CARM FLUORO LESS THAN 5 MIN

## 2018-04-06 PROCEDURE — 71000014 ZZH RECOVERY PHASE 1 LEVEL 2 FIRST HR: Performed by: PODIATRIST

## 2018-04-06 PROCEDURE — 27676 REPAIR LOWER LEG TENDONS: CPT | Mod: RT | Performed by: PODIATRIST

## 2018-04-06 PROCEDURE — 25000125 ZZHC RX 250: Performed by: NURSE ANESTHETIST, CERTIFIED REGISTERED

## 2018-04-06 PROCEDURE — 27210794 ZZH OR GENERAL SUPPLY STERILE: Performed by: PODIATRIST

## 2018-04-06 PROCEDURE — 25000564 ZZH DESFLURANE, EA 15 MIN: Performed by: PODIATRIST

## 2018-04-06 PROCEDURE — 37000009 ZZH ANESTHESIA TECHNICAL FEE, EACH ADDTL 15 MIN: Performed by: PODIATRIST

## 2018-04-06 PROCEDURE — 40000306 ZZH STATISTIC PRE PROC ASSESS II: Performed by: PODIATRIST

## 2018-04-06 PROCEDURE — 37000008 ZZH ANESTHESIA TECHNICAL FEE, 1ST 30 MIN: Performed by: PODIATRIST

## 2018-04-06 PROCEDURE — 25000128 H RX IP 250 OP 636: Performed by: PODIATRIST

## 2018-04-06 PROCEDURE — 71000027 ZZH RECOVERY PHASE 2 EACH 15 MINS: Performed by: PODIATRIST

## 2018-04-06 PROCEDURE — 25000128 H RX IP 250 OP 636: Performed by: NURSE ANESTHETIST, CERTIFIED REGISTERED

## 2018-04-06 PROCEDURE — 27211024 ZZHC OR SUPPLY OTHER OPNP: Performed by: PODIATRIST

## 2018-04-06 PROCEDURE — 36000056 ZZH SURGERY LEVEL 3 1ST 30 MIN: Performed by: PODIATRIST

## 2018-04-06 PROCEDURE — 27810325 ZZHC OR IMPLANT OTHER OPNP: Performed by: PODIATRIST

## 2018-04-06 RX ORDER — ACETAMINOPHEN 10 MG/ML
INJECTION, SOLUTION INTRAVENOUS PRN
Status: DISCONTINUED | OUTPATIENT
Start: 2018-04-06 | End: 2018-04-06

## 2018-04-06 RX ORDER — BUPIVACAINE HYDROCHLORIDE AND EPINEPHRINE 5; 5 MG/ML; UG/ML
INJECTION, SOLUTION PERINEURAL PRN
Status: DISCONTINUED | OUTPATIENT
Start: 2018-04-06 | End: 2018-04-06

## 2018-04-06 RX ORDER — MEPERIDINE HYDROCHLORIDE 50 MG/ML
12.5 INJECTION INTRAMUSCULAR; INTRAVENOUS; SUBCUTANEOUS
Status: DISCONTINUED | OUTPATIENT
Start: 2018-04-06 | End: 2018-04-06 | Stop reason: HOSPADM

## 2018-04-06 RX ORDER — DEXAMETHASONE SODIUM PHOSPHATE 10 MG/ML
INJECTION, SOLUTION INTRAMUSCULAR; INTRAVENOUS PRN
Status: DISCONTINUED | OUTPATIENT
Start: 2018-04-06 | End: 2018-04-06

## 2018-04-06 RX ORDER — HYDROMORPHONE HYDROCHLORIDE 1 MG/ML
.3-.5 INJECTION, SOLUTION INTRAMUSCULAR; INTRAVENOUS; SUBCUTANEOUS EVERY 10 MIN PRN
Status: DISCONTINUED | OUTPATIENT
Start: 2018-04-06 | End: 2018-04-06 | Stop reason: HOSPADM

## 2018-04-06 RX ORDER — LIDOCAINE 40 MG/G
CREAM TOPICAL
Status: DISCONTINUED | OUTPATIENT
Start: 2018-04-06 | End: 2018-04-06 | Stop reason: HOSPADM

## 2018-04-06 RX ORDER — FENTANYL CITRATE 50 UG/ML
25-50 INJECTION, SOLUTION INTRAMUSCULAR; INTRAVENOUS
Status: DISCONTINUED | OUTPATIENT
Start: 2018-04-06 | End: 2018-04-06 | Stop reason: HOSPADM

## 2018-04-06 RX ORDER — OXYCODONE HYDROCHLORIDE 5 MG/1
5 TABLET ORAL EVERY 4 HOURS PRN
Status: DISCONTINUED | OUTPATIENT
Start: 2018-04-06 | End: 2018-04-06 | Stop reason: HOSPADM

## 2018-04-06 RX ORDER — LIDOCAINE HYDROCHLORIDE 20 MG/ML
INJECTION, SOLUTION INFILTRATION; PERINEURAL PRN
Status: DISCONTINUED | OUTPATIENT
Start: 2018-04-06 | End: 2018-04-06

## 2018-04-06 RX ORDER — CEFAZOLIN SODIUM 1 G/3ML
1 INJECTION, POWDER, FOR SOLUTION INTRAMUSCULAR; INTRAVENOUS SEE ADMIN INSTRUCTIONS
Status: DISCONTINUED | OUTPATIENT
Start: 2018-04-06 | End: 2018-04-06 | Stop reason: HOSPADM

## 2018-04-06 RX ORDER — AMOXICILLIN 250 MG
1-2 CAPSULE ORAL 2 TIMES DAILY
Qty: 30 TABLET | Refills: 3 | Status: SHIPPED | OUTPATIENT
Start: 2018-04-06 | End: 2018-07-02

## 2018-04-06 RX ORDER — CEFAZOLIN SODIUM 2 G/100ML
2 INJECTION, SOLUTION INTRAVENOUS
Status: COMPLETED | OUTPATIENT
Start: 2018-04-06 | End: 2018-04-06

## 2018-04-06 RX ORDER — ALBUTEROL SULFATE 0.83 MG/ML
2.5 SOLUTION RESPIRATORY (INHALATION) EVERY 4 HOURS PRN
Status: DISCONTINUED | OUTPATIENT
Start: 2018-04-06 | End: 2018-04-06 | Stop reason: HOSPADM

## 2018-04-06 RX ORDER — ONDANSETRON 2 MG/ML
4 INJECTION INTRAMUSCULAR; INTRAVENOUS EVERY 30 MIN PRN
Status: DISCONTINUED | OUTPATIENT
Start: 2018-04-06 | End: 2018-04-06 | Stop reason: HOSPADM

## 2018-04-06 RX ORDER — SODIUM CHLORIDE, SODIUM LACTATE, POTASSIUM CHLORIDE, CALCIUM CHLORIDE 600; 310; 30; 20 MG/100ML; MG/100ML; MG/100ML; MG/100ML
INJECTION, SOLUTION INTRAVENOUS CONTINUOUS
Status: DISCONTINUED | OUTPATIENT
Start: 2018-04-06 | End: 2018-04-06 | Stop reason: HOSPADM

## 2018-04-06 RX ORDER — HYDRALAZINE HYDROCHLORIDE 20 MG/ML
2.5-5 INJECTION INTRAMUSCULAR; INTRAVENOUS EVERY 10 MIN PRN
Status: DISCONTINUED | OUTPATIENT
Start: 2018-04-06 | End: 2018-04-06 | Stop reason: HOSPADM

## 2018-04-06 RX ORDER — ONDANSETRON 4 MG/1
4 TABLET, ORALLY DISINTEGRATING ORAL EVERY 30 MIN PRN
Status: DISCONTINUED | OUTPATIENT
Start: 2018-04-06 | End: 2018-04-06 | Stop reason: HOSPADM

## 2018-04-06 RX ORDER — ONDANSETRON 4 MG/1
4 TABLET, ORALLY DISINTEGRATING ORAL
Status: DISCONTINUED | OUTPATIENT
Start: 2018-04-06 | End: 2018-04-06 | Stop reason: HOSPADM

## 2018-04-06 RX ORDER — PROPOFOL 10 MG/ML
INJECTION, EMULSION INTRAVENOUS CONTINUOUS PRN
Status: DISCONTINUED | OUTPATIENT
Start: 2018-04-06 | End: 2018-04-06

## 2018-04-06 RX ORDER — ONDANSETRON 2 MG/ML
INJECTION INTRAMUSCULAR; INTRAVENOUS PRN
Status: DISCONTINUED | OUTPATIENT
Start: 2018-04-06 | End: 2018-04-06

## 2018-04-06 RX ORDER — KETOROLAC TROMETHAMINE 30 MG/ML
INJECTION, SOLUTION INTRAMUSCULAR; INTRAVENOUS PRN
Status: DISCONTINUED | OUTPATIENT
Start: 2018-04-06 | End: 2018-04-06

## 2018-04-06 RX ORDER — NALOXONE HYDROCHLORIDE 0.4 MG/ML
.1-.4 INJECTION, SOLUTION INTRAMUSCULAR; INTRAVENOUS; SUBCUTANEOUS
Status: DISCONTINUED | OUTPATIENT
Start: 2018-04-06 | End: 2018-04-06 | Stop reason: HOSPADM

## 2018-04-06 RX ORDER — HYDROXYZINE HYDROCHLORIDE 25 MG/1
25 TABLET, FILM COATED ORAL
Status: DISCONTINUED | OUTPATIENT
Start: 2018-04-06 | End: 2018-04-06 | Stop reason: HOSPADM

## 2018-04-06 RX ORDER — PROPOFOL 10 MG/ML
INJECTION, EMULSION INTRAVENOUS PRN
Status: DISCONTINUED | OUTPATIENT
Start: 2018-04-06 | End: 2018-04-06

## 2018-04-06 RX ORDER — OXYCODONE AND ACETAMINOPHEN 5; 325 MG/1; MG/1
1-2 TABLET ORAL EVERY 4 HOURS PRN
Qty: 45 TABLET | Refills: 0 | Status: SHIPPED | OUTPATIENT
Start: 2018-04-06 | End: 2018-04-11

## 2018-04-06 RX ORDER — EPHEDRINE SULFATE 50 MG/ML
INJECTION, SOLUTION INTRAMUSCULAR; INTRAVENOUS; SUBCUTANEOUS PRN
Status: DISCONTINUED | OUTPATIENT
Start: 2018-04-06 | End: 2018-04-06

## 2018-04-06 RX ORDER — METHOCARBAMOL 750 MG/1
750 TABLET, FILM COATED ORAL
Status: DISCONTINUED | OUTPATIENT
Start: 2018-04-06 | End: 2018-04-06 | Stop reason: HOSPADM

## 2018-04-06 RX ORDER — PROMETHAZINE HYDROCHLORIDE 25 MG/ML
12.5 INJECTION, SOLUTION INTRAMUSCULAR; INTRAVENOUS
Status: DISCONTINUED | OUTPATIENT
Start: 2018-04-06 | End: 2018-04-06 | Stop reason: HOSPADM

## 2018-04-06 RX ORDER — SCOLOPAMINE TRANSDERMAL SYSTEM 1 MG/1
1 PATCH, EXTENDED RELEASE TRANSDERMAL
Status: DISCONTINUED | OUTPATIENT
Start: 2018-04-06 | End: 2018-04-06 | Stop reason: HOSPADM

## 2018-04-06 RX ORDER — FENTANYL CITRATE 50 UG/ML
INJECTION, SOLUTION INTRAMUSCULAR; INTRAVENOUS PRN
Status: DISCONTINUED | OUTPATIENT
Start: 2018-04-06 | End: 2018-04-06

## 2018-04-06 RX ORDER — BUPIVACAINE HYDROCHLORIDE 5 MG/ML
INJECTION, SOLUTION PERINEURAL PRN
Status: DISCONTINUED | OUTPATIENT
Start: 2018-04-06 | End: 2018-04-06 | Stop reason: HOSPADM

## 2018-04-06 RX ORDER — HYDROXYZINE HYDROCHLORIDE 25 MG/1
25 TABLET, FILM COATED ORAL EVERY 6 HOURS PRN
Qty: 30 TABLET | Refills: 3 | Status: SHIPPED | OUTPATIENT
Start: 2018-04-06 | End: 2018-10-03

## 2018-04-06 RX ADMIN — PROPOFOL 200 MG: 10 INJECTION, EMULSION INTRAVENOUS at 09:00

## 2018-04-06 RX ADMIN — SCOPALAMINE 1 PATCH: 1 PATCH, EXTENDED RELEASE TRANSDERMAL at 08:11

## 2018-04-06 RX ADMIN — Medication 25 ML: at 11:21

## 2018-04-06 RX ADMIN — HYDROMORPHONE HYDROCHLORIDE 0.5 MG: 1 INJECTION, SOLUTION INTRAMUSCULAR; INTRAVENOUS; SUBCUTANEOUS at 09:15

## 2018-04-06 RX ADMIN — BUPIVACAINE HYDROCHLORIDE 10 ML: 5 INJECTION, SOLUTION PERINEURAL at 10:41

## 2018-04-06 RX ADMIN — CEFAZOLIN SODIUM 2 G: 2 INJECTION, SOLUTION INTRAVENOUS at 09:05

## 2018-04-06 RX ADMIN — LIDOCAINE HYDROCHLORIDE 1 ML: 10 INJECTION, SOLUTION EPIDURAL; INFILTRATION; INTRACAUDAL at 08:40

## 2018-04-06 RX ADMIN — MIDAZOLAM 2 MG: 1 INJECTION INTRAMUSCULAR; INTRAVENOUS at 08:55

## 2018-04-06 RX ADMIN — PROPOFOL 200 MCG/KG/MIN: 10 INJECTION, EMULSION INTRAVENOUS at 09:05

## 2018-04-06 RX ADMIN — KETOROLAC TROMETHAMINE 15 MG: 30 INJECTION, SOLUTION INTRAMUSCULAR at 10:48

## 2018-04-06 RX ADMIN — FENTANYL CITRATE 50 MCG: 50 INJECTION, SOLUTION INTRAMUSCULAR; INTRAVENOUS at 11:17

## 2018-04-06 RX ADMIN — Medication 10 MG: at 09:11

## 2018-04-06 RX ADMIN — LIDOCAINE HYDROCHLORIDE 40 MG: 20 INJECTION, SOLUTION INFILTRATION; PERINEURAL at 09:00

## 2018-04-06 RX ADMIN — FENTANYL CITRATE 50 MCG: 50 INJECTION, SOLUTION INTRAMUSCULAR; INTRAVENOUS at 11:14

## 2018-04-06 RX ADMIN — ONDANSETRON 4 MG: 2 INJECTION INTRAMUSCULAR; INTRAVENOUS at 09:15

## 2018-04-06 RX ADMIN — FENTANYL CITRATE 100 MCG: 50 INJECTION, SOLUTION INTRAMUSCULAR; INTRAVENOUS at 08:55

## 2018-04-06 RX ADMIN — DEXAMETHASONE SODIUM PHOSPHATE 5 MG: 10 INJECTION, SOLUTION INTRAMUSCULAR; INTRAVENOUS at 09:15

## 2018-04-06 RX ADMIN — SODIUM CHLORIDE, POTASSIUM CHLORIDE, SODIUM LACTATE AND CALCIUM CHLORIDE: 600; 310; 30; 20 INJECTION, SOLUTION INTRAVENOUS at 08:40

## 2018-04-06 RX ADMIN — DEXAMETHASONE SODIUM PHOSPHATE 10 MG: 10 INJECTION, SOLUTION INTRAMUSCULAR; INTRAVENOUS at 11:21

## 2018-04-06 RX ADMIN — ACETAMINOPHEN 1000 MG: 10 INJECTION, SOLUTION INTRAVENOUS at 09:45

## 2018-04-06 NOTE — IP AVS SNAPSHOT
[facility]    [address] [facility]     [place] [address]    Phone:  [phone]                                       After Visit Summary   4/6/2018    Lida Blood    MRN: [id number]           After Visit Summary Signature Page     I have received my discharge instructions, and my questions have been answered. I have discussed any challenges I see with this plan with the nurse or doctor.    ..........................................................................................................................................  Patient/Patient Representative Signature      ..........................................................................................................................................  Patient Representative Print Name and Relationship to Patient    ..................................................               ................................................  Date                                            Time    ..........................................................................................................................................  Reviewed by Signature/Title    ...................................................              ..............................................  Date                                                            Time

## 2018-04-06 NOTE — ANESTHESIA PROCEDURE NOTES
Peripheral nerve/Neuraxial procedure note : Popliteal  Pre-Procedure    Location: post-op    Procedure Times:4/6/2018 11:10 AM and 4/6/2018 11:23 AM  Pre-Anesthestic Checklist: patient identified, IV checked, site marked, risks and benefits discussed, informed consent, monitors and equipment checked, pre-op evaluation, at physician/surgeon's request and post-op pain management    Timeout  Correct Patient: Yes   Correct Procedure: Yes   Correct Site: Yes   Correct Laterality: Yes   Correct Position: Yes   Site Marked: Yes   .   Procedure Documentation    .    Procedure:  right  Popliteal.  Local skin infiltrated with 2 mL of 1% lidocaine.     Ultrasound used to identify targeted nerve, plexus, or vascular marker and placed a needle adjacent to it., Ultrasound was used to visualize the spread of the anesthetic in close proximity to the above stated nerve.   Patient Prep;mask, sterile gloves, chlorhexidine gluconate and isopropyl alcohol.  Nerve Stim: Initial Level 0.05 mA.  Lowest motor response 0.22 mA..  Needle: insulated Needle Gauge: 22.  Needle Length (millimeters) 100  Insertion Method: Single Shot.       Assessment/Narrative  Paresthesias: Resolved.  Injection made incrementally with aspirations every 5 mL..  The placement was negative for: blood aspirated, painful injection and site bleeding.  Bolus given via needle..   Secured via.   Complications: none. Test dose of mL at. Test dose negative for signs of intravascular, subdural or intrathecal injection. Comments:  Pt tolerated procedure well.  Patient's pain decreased to 0 / 10 from 6-7/10.  No complications noted.  Will follow if needed.

## 2018-04-06 NOTE — PROGRESS NOTES
Weight management plan: Patient was referred to their PCP to discuss a diet and exercise plan.    HPI:     Onset Fall 2016, started out as more constant lateral Right ankle pain.  Minor ankle sprains off and on throughout her life starting after a bad sprain as a young adult.  Ankle has become more weaker over the last few years and then she has developed what she feels are the tendons behind her fibula pop out of place and her ankle becomes weaker and weaker and more painful up the outside of her leg.  She notes that she is unable to balance on the right foot as well as the left, in spite of her working on balance activities stretching and strengthening of the peroneals and balance boards.  She states her right ankle seems to be weaker even though she has been bracing it and performing physical therapy and working on balance.  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  On long days of standing at work she has increased pain that she is no longer able to recover from as she did a couple years ago  Dull ache, radiates up side of lower leg, burning, pain feels like it moves, swelling, pain 4. History of previous right ankle sprain in high school as well.  Supportive shoes, ice    Works at [employer].     ROS:  10 point ROS neg other than the symptoms noted above in the HPI.    PAST MEDICAL HISTORY:   Past Medical History:   Diagnosis Date     Endometriosis of pelvic peritoneum 10/06/08     Endometriosis of uterus 10/06/08    Admit. 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    s/p radioactive iodine ablation Rx     Major depressive disorder, single episode, moderate (H)      Migraine headache 10/14/2011     Obesity (BMI 30-39.9)      Other and unspecified ovarian cyst 03/02/10    d/c 03/04/10     Pain pelvic 12/14/04     Discharged 12/17/04-[facility]     PONV (postoperative nausea and vomiting)      S/P hysterectomy      Thyrotoxicosis without mention of goiter or other cause, without mention of thyrotoxic crisis or storm 2001     trachelectomy 05/19/09        PAST SURGICAL HISTORY:   Past Surgical History:   Procedure Laterality Date     C APPENDECTOMY  4/18/2003     C REMOVAL OF CERVIX  05/29/09    laparoscopic assisted vaginal trachelectomy     C SUPRACERV ABD HYSTERECTOMY  10/06/08     COLONOSCOPY  11/25/14     ESOPHAGOSCOPY, GASTROSCOPY, DUODENOSCOPY (EGD), COMBINED N/A 8/31/2015    Procedure: COMBINED ESOPHAGOSCOPY, GASTROSCOPY, DUODENOSCOPY (EGD), BIOPSY SINGLE OR MULTIPLE;  Surgeon: Toni Wiggins MD;  Location:  GI     ESOPHAGOSCOPY, GASTROSCOPY, DUODENOSCOPY (EGD), COMBINED N/A 8/18/2016    Procedure: COMBINED ESOPHAGOSCOPY, GASTROSCOPY, DUODENOSCOPY (EGD);  Surgeon: Jaswinder Waddell MD;  Location: [facility]     ESOPHAGOSCOPY, GASTROSCOPY, DUODENOSCOPY (EGD), COMBINED N/A 3/1/2017    Procedure: COMBINED ESOPHAGOSCOPY, GASTROSCOPY, DUODENOSCOPY (EGD), BIOPSY SINGLE OR MULTIPLE;  Surgeon: Jan Johnston MD;  Location:  GI     ESOPHAGOSCOPY, GASTROSCOPY, DUODENOSCOPY (EGD), DILATATION, COMBINED N/A 4/13/2016    Procedure: COMBINED ESOPHAGOSCOPY, GASTROSCOPY, DUODENOSCOPY (EGD), DILATATION;  Surgeon: Jos Dinero MD;  Location:  GI     EYE SURGERY      bilat orbital decompression surgery      HC COLONOSCOPY W BIOPSY  1/22/2007     HC COLONOSCOPY W/WO BRUSH/WASH  12/16/04     HC CREATE EARDRUM OPENING,GEN ANESTH  12/29/88    Bilateral myringotomy with insertion of PE tubes     HC CREATE EARDRUM OPENING,GEN ANESTH  12/20/90    Bilateral myringotomy with insertion of PE tubes     HC LAP,FULGURATE/EXCISE LESIONS  06/16/08    Laparoscopy, ablation of the endometriosis and diagnostic cystoscopy     HC LAPAROSCOPY, SURGICAL, ABDOMEN, PERITONEUM & OMENTUM; DX W/ OR W/O SPECIMEN(S)  04/18/2003     Diagnostic laparoscopy     HC REMOVAL GALLBLADDER  8/1/03     HC TYMPANOPLASTY W/O MASTOID, INIT/REV W/O OSS CHAIN RECONST  10/03/00    Left exploratory tympanoplasty, myringoplasty, fascia grafting of the oval window, microdissection via the use of operative microscope     HC UGI ENDOSCOPY, SIMPLE EXAM  3/23/12, 11/13/14     HERNIA REPAIR, INCISIONAL  08/23/2006    Laparoscopic mesh repair.     HYSTERECTOMY, PAP NO LONGER INDICATED  5/29/09     HYSTEROSCOPY  02/08/08     LAPAROSCOPIC GASTRECTOMY N/A 3/15/2016    Procedure: LAPAROSCOPIC GASTRECTOMY;  Surgeon: Jos Dinero MD;  Location: [facility] OR     LAPAROSCOPIC LYSIS ADHESIONS  11/02/10    [facility]     LAPAROSCOPIC REVISION GASTROPLASTY TO ELBERT-EN-Y N/A 10/26/2015    Procedure: LAPAROSCOPIC REVISION GASTROPLASTY TO ELBERT-EN-Y;  Surgeon: Toni Wiggins MD;  Location:  OR     LAPAROSCOPIC SALPINGO-OOPHORECTOMY Left 3/2015     LAPAROSCOPY DIAGNOSTIC (GENERAL) Right 06/23/11    Evaluation of abdomen/pelvis, RSO, Cysto; [facility]        MEDICATIONS:   Current Facility-Administered Medications:      lidocaine 1 % 1 mL, 1 mL, Other, Q1H PRN, Lukas Newell APRN CRNA, 1 mL at 04/06/18 0840     lidocaine (LMX4) kit, , Topical, Q1H PRN, Lukas Newell APRN CRNA     sodium chloride (PF) 0.9% PF flush 3 mL, 3 mL, Intracatheter, Q1H PRN, Lukas Newell APRN CRNA     sodium chloride (PF) 0.9% PF flush 3 mL, 3 mL, Intracatheter, Q8H, Lukas Newell APRN CRNA     lactated ringers infusion, , Intravenous, Continuous, Lukas Newell APRN CRNA, Last Rate: 25 mL/hr at 04/06/18 0840     ceFAZolin (ANCEF) 1 g vial to attach to  ml bag for ADULT or 50 ml bag for PEDS, 1 g, Intravenous, See Admin Instructions, Delgado Martinez DPM     ceFAZolin (ANCEF) intermittent infusion 2 g in 100 mL dextrose PRE-MIX, 2 g, Intravenous, Pre-Op/Pre-procedure x 1 dose, Delgado Martinez DPM     scopolamine (TRANSDERM) 72 hr patch 1 patch, 1 patch, Transdermal,  Q72H, 1 patch at 04/06/18 0811 **AND** scopolamine (TRANSDERM-SCOP) Patch in Place, , Transdermal, Q8H **AND** [START ON 4/9/2018] scopolamine (TRANSDERM-SCOP) patch REMOVAL, , Transdermal, Q72H, Lukas Newell APRN CRNA     ALLERGIES:    Allergies   Allergen Reactions     No Known Drug Allergies         SOCIAL HISTORY:   Social History     Social History     Marital status:      Spouse name: patric     Number of children: 01     Years of education: 12     Occupational History     specialist Other     [employer]     Social History Main Topics     Smoking status: Former Smoker     Packs/day: 0.25     Years: 6.00     Types: Cigarettes     Quit date: 6/16/2015     Smokeless tobacco: Never Used      Comment: social     Alcohol use Yes      Comment: rare     Drug use: No     Sexual activity: Yes     Partners: Male     Birth control/ protection: Surgical      Comment: hysterectomy 2008     Other Topics Concern      Service No     Blood Transfusions No     Caffeine Concern No     Occupational Exposure No     Hobby Hazards No     Sleep Concern No     Stress Concern Yes     Weight Concern Yes     she exercised all the time     Special Diet No     Back Care No     Exercise Yes     all the time     Bike Helmet No     Seat Belt Yes     Self-Exams No     Social History Narrative     FAMILY HISTORY:   Family History   Problem Relation Age of Onset     Breast Cancer Mother      age 42 on chemo and radiation     Gynecology Mother      endometriosis     DIABETES Maternal Grandmother      type 1     Thyroid Disease Maternal Grandmother      C.A.D. Maternal Grandmother      Cardiovascular Maternal Grandmother      HEART DISEASE Maternal Grandmother      HEART DISEASE Paternal Grandmother      DIABETES Maternal Grandfather      diabetes     Thyroid Disease Maternal Aunt      HEART DISEASE Maternal Aunt      great MGA     Gynecology Sister      endometriosis     Anesthesia Reaction No family hx of         EXAM:Vitals: BP  120/89  Temp 97.9  F (36.6  C) (Oral)  Resp 18  LMP 09/01/2008  SpO2 100%  BMI= There is no height or weight on file to calculate BMI.    General appearance: Patient is alert and fully cooperative with history & exam.  No sign of distress is noted during the visit.     Psychiatric: Affect is pleasant & appropriate.  Patient appears motivated to improve health.     Respiratory: Breathing is regular & unlabored while sitting.     HEENT: Hearing is intact to spoken word.  Speech is clear.  No gross evidence of visual impairment that would impact ambulation.     Vascular: DP & PT pulses are intact & regular bilaterally.  No significant edema or varicosities noted.  CFT and skin temperature is normal to both lower extremities.     Neurologic: Lower extremity sensation is intact to light touch.  No evidence of weakness or contracture in the lower extremities.  No evidence of neuropathy.    Dermatologic: Skin is intact to both lower extremities with adequate texture, turgor and tone about the integument.  No paronychia or evidence of soft tissue infection is noted.     Musculoskeletal: Patient is ambulatory without assistive device or brace.  No gross ankle deformity noted.  Fairly rectus foot type is noted with minimal valgus deformity and no medial column faulting.  Calcaneus remains rectus throughout gait.  No foot or ankle joint effusion is noted.  Very positive anterior drawer noted about the right ankle but not on the left.  There is clear reproduction of discomfort with attempting the anterior drawer on the right and no discomfort on the left.  I do not detect any clinical diastases about the distal tibia and fibula throughout range of motion of the ankle.  There is crepitus within the peroneal tendons on the right foot with very minimal induration. Peroneal tendons are strong.     Much diminished proprioception is noted with balance and unilateral toe raise on the right when compared to the left.  She has  diminished proprioception and strength about the right ankle when compared to the left ankle and this has changed over the last several months.  Most pain and symptoms are noted about the peroneal tendons distal fibula anterior and posterior as well as a little bit about the lateral ankle gutter but not about the anterior margin of the ankle.    There is palpable prominence with subtle subluxation of the peroneal tendons on the right only.  This clinical subluxation is distinct on the right when compared to the left there appears to be slightly increased volume of the peroneal tendons with crepitus throughout range of motion and upon extreme active eversion of the right ankle crepitus is reproduced with prominence of the peroneal tendons but not complete subluxation.    Imaging: Radiographs of the right ankle demonstrate no joint space narrowing and no loose bodies or osteophytes noted.  No loss of trabeculation or osteochondral defect noted.  Rectus foot type noted.  Any widening or diastases of the syndesmosis is not clear but may be possible on these weightbearing ankle radiographs.    MRI 11/14/17: Flattening of the peroneal tendons with indistinct peroneal groove. No tendinosis. Lateral ankle ligaments are present on MRI.  No obvious change, reaction, or loss of syndesmosis noted on MRI.  No inflammatory changes are noted through the syndesmosis and minimal through the capsule.     ASSESSMENT:     Right ankle instability,   Peroneal subluxation right ankle  Hernial tendinitis right ankle    PLAN:  Reviewed [facility] in [facility].      5/24/2017   Obtained and interpreted radiographs  This patient has ankle instability and has developed peroneal tendinitis tendinopathy to accommodate for a very positive anterior drawer unstable right ankle.   Have ordered physical therapy to improve proprioception  Recommend sturdy stable shoes written instructions dispensed  Order for custom molded orthotics to provide some  stability  May also consider an ankle brace for a month or 2 or until symptoms are improving then discontinue it  Also begin oral anti-inflammatory for month or 2 or until symptoms improve  After she is established this care if her symptoms continue we will image with x-ray of the foot and ankle and MRI of the right ankle.    Voltaren medication prescribed today is an NSAID.  If you had a recent ulcer you probably have been instructed to discontinue oral NSAIDs. Do not start this medication if you have been instructed to discontinue use of oral NSAIDs or few have an active or recently active gastric ulcer.    11/2/2017  She did not utilize the oral anti-inflammatories secondary to her gastric ulcer, second one.  Has done PT for several visits and she is doing them at home still.   She is able to perform unilateral toe raise but with challenges on the right  MRI right ankle ordered today  Follow-up after MRI    12/4/2017  Reviewed MRI findings demonstrating mild flattening of the peroneal tendons with probable low-lying muscle belly and very indistinct peroneal groove. Also ATF CF ligaments are intact however there likely elongated as she has a very positive anterior drawer when compared to the other side clinically. We discussed treatment options of continued physical therapy to improve balance strength proprioception which she is doing at home. We discussed continued orthotics and appropriate shoe gear which she is using. We discussed injections and surgical stabilization area this would likely include Arthrex ankle stabilization with scope. We also discussed peroneal regrooving. At this time she would like to return to work and activities as tolerated and follow-up to schedule reconstruction if she does not improve.    All questions were answered. Follow-up as needed.    3/21/2018  This patient would like to move forward with surgical stabilization of her ankle and peroneal tendon re-grooving.  She feels her ankle  has weakened over the last year or 2 and now her peroneal tendons are becoming symptomatic and weaker more symptomatic over time.  She has attempted physical therapy, proprioception training, bracing, orthotics, anti-inflammatories, and over the last year her ankle becomes weaker and peroneal tendons more prominent with subluxation.    We discussed treatment options and alternative treatment options as well as expectations and postoperative needs.  All questions were answered no guarantees were given or implied.  She wishes to proceed with surgical treatment in the next few weeks and we will move forward with scheduling this .    4/6/2018  I obtained informed consent to treat Right ankle instability, peroneal tendon subluxation, peroneal tendinitis with treatment of Procedure(s):  Right ankle stabilization with Arthrex internal brace  Right ankle peroneal tendon re-grooving  Right ankle peroneal tendon repair     I discussed with the patient potential risks and complications as well as alternative treatment options and post op care requirements.   I answered all questions for the patient.  No guarantees were given or implied.  They wish to proceed with surgical treatment.  They have been NPO for 8 hours or more.  No contraindications to surgical treatment at this time.        Delgado Martinez DPM          Please schedule for surgery, pre op H&P, and post ops.    Surgery:  Patient Name:  Lida Blood ([id number])  Procedure:   Right ankle stabilization with Arthrex internal brace  Drill fibula with peroneal tendon re-grooving  Peroneal tendon repair  Diagnosis:    Right ankle instability  Peroneal tendon subluxation  Peroneal tendinitis  Assistant: first assist  Surgeon:  Delgado Martinez DPM  Anesthesia:  General and Post op popliteal block  PT type:  Same Day Surgery  Time needed: 120 minutes  Patient position:  lying supine  Mini fluoro:  Yes  C-arm:  no  Equipment:  arthrex internal brace, drill  with approximately 2.0 mm-4 mm bit  Anticoagulation:  No  Vendor:  Request equipment rep Ryan Tong with [employer] will be present for case, office [phone], cell [phone]  Surgeon Notes: Also concern for syndesmosis    Post op appts:    5-7 days po  12-15 days po  approx 4 weeks  approx 8 weeks  just before 12 weeks    Expected work restrictions:  strict no weight bearing in cast or splint for one month followed by one month of protected weight bearing in a fracture boot. Some restrictions for up to 12 weeks    [facility] Discussed:  Not Applicable

## 2018-04-06 NOTE — ANESTHESIA POSTPROCEDURE EVALUATION
Patient: Lida Blood    Procedure(s):  Right ankle stabilization with Arthrex internal brace, drill fibula with peroneal tendon re-grooving, peroneal tendon repair - Wound Class: I-Clean   - Wound Class: I-Clean    Diagnosis:Right ankle instability, peroneal tendon subluxation, peroneal tendinitis  Diagnosis Additional Information: No value filed.    Anesthesia Type:  General, LMA, Periph. Nerve Block for postop pain    Note:  Anesthesia Post Evaluation    Patient location during evaluation: Phase 2  Patient participation: Able to fully participate in evaluation  Level of consciousness: awake  Pain management: adequate  Airway patency: patent  Cardiovascular status: acceptable and hemodynamically stable  Respiratory status: acceptable, room air and nonlabored ventilation  Hydration status: stable  PONV: none     Anesthetic complications: None    Comments: Patient was happy with the anesthesia care received and no anesthesia related complications were noted.  I will follow up with the patient again if it is needed.        Last vitals:  Vitals:    04/06/18 1125 04/06/18 1130 04/06/18 1140   BP: 107/58 98/65 107/68   Pulse:   75   Resp: 8 12 12   Temp:  98.3  F (36.8  C)    SpO2: 96% 98% 97%         Electronically Signed By: KATE Murphy CRNA  April 6, 2018  12:30 PM

## 2018-04-06 NOTE — OP NOTE
Procedure Date: 04/06/2018      SURGEON:  Delgado Martinez DPM.      ASSISTANT:  None.      PREOPERATIVE DIAGNOSES:   1.  Chronic ankle instability, right ankle.   2.  Peroneal tendon subluxation.   3.  Peroneal tendinitis.      POSTOPERATIVE DIAGNOSES:     1.  Chronic ankle instability, right ankle.   2.  Peroneal tendon subluxation.   3.  Peroneal tendinitis.      PLANNED PROCEDURE:   1.  Ankle stabilization with Arthrex internal brace, right ankle.   2.  Peroneal tendon regrooving, right ankle.   3.  Peroneal tendon debridement with repair, right ankle.      DESCRIPTION OF PROCEDURE:  In the preoperative holding area, informed consent was obtained.  We discussed potential risks and complications, as well as alternative treatment options.  We reviewed postoperative requirements as well, as well as expectations of the procedures.  All questions were answered.  She wishes to proceed with surgery.  No contraindications were noted.        She was brought into the operating room and placed on the operating table in a supine position.  She was given general anesthesia by the anesthesia department.  A well-padded right thigh tourniquet was placed.  The foot was prepped and draped in the usual sterile fashion.  A timeout was performed.  The right thigh was exsanguinated with an Esmarch and the cuff was inflated to 350 mmHg.  A curvilinear incision was then placed from about 2 cm proximal to the distal tip of the fibula, starting on the posterior margin of the fibula, extending about a cm distal to the fibula, and then back through the sinus tarsi to the talar neck in a curvilinear fashion.  The total length of the incision is about 6-8 cm.  This flap was then brought proximally.  The peroneal tendon sheath was found to be very fibrillated, thin, about the distal tip of the fibula.  Significant synovitis was noted throughout the peroneal tendon sheath, especially to the distal portion of the fibula.  Anterior drawer was very  positive, but no diastasis about the distal tibia or fibula could be obtained.  The syndesmosis appeared to be intact.  A 15 blade was then utilized to make a linear incision over the anterior aspect of the fibula and peeled the anterior talofibular ligament distally.  The talar dome was protected throughout.  Two drill holes were then placed, one about 1 cm from the distal aspect of the fibula and then a second drill hole was then placed about 1 cm proximal to this, approximately at the margin of the ankle joint and about 5 mm lateral to the cartilaginous surface of the fibula.  Arthrex anchors were then placed and the ATF was reefed and repaired.  Good tension of the ATF was reduced.  The Arthrex internal brace was then placed in the talar neck, aiming into the body, being certain not to place this in the ankle or the subtalar joint.  Good bite was obtained.  The ankle was then brought to 90 degrees.  A hemostat was placed under the internal brace.  A drill hole was then placed through the fibula between the previously drilled suture tacks and this tension was kept as the internal brace was anchor in.  The ankle was placed through full range of motion and the internal brace was found to be quite tight with good plantar flexion and negative anterior drawer.  Attention was then directed to the peroneal tendons.  The sheath was opened from the distal fibula to approximately 4 cm proximal.  A low-lying muscle belly was identified in the peroneal brevis.  This was debrided for about 3-4 cm.  The peroneal brevis was found to be very flattened with a longitudinal split and this was debrided and repaired with a 3-0 PDS.  The tendon was tubularized with a 3-0 PDS throughout the peroneal tendon sheath and significant subluxation was noted, especially about the distal fibula secondary to the contour of the fibula.  The fibular groove was found to be quite flattened throughout.  It was at this time we decided to deepen the  groove of the peroneal groove of the fibula.   A 2.0 drill bit was then introduced from the distal aspect of the fibula with a drill sleeve and drilled approximately 3 cm proximal.  A 2.5 drill, 3.0 and a 3.5 drill were then placed.  A bone tamp was then utilized to tamp the posterior aspect of the fibula deeper into the groove and this was done without complication.  The periosteum remained intact throughout the groove.  Surgical site was flushed again with copious amounts of sterile saline.  Deep soft tissue structures, including the peroneal tendon sheath were closed with a 3-0 Vicryl.  Skin was closed with 4-0 Vicryl and 4-0 Prolene.  Ten mL of 0.5% Marcaine plain were then injected about the ankle and peroneal tendon sheath to achieve local anesthesia.  Dressing was Adaptic and a bulky sterile compression dressing and a posterior splint.  The tourniquet was released after approximately 90 minutes followed by immediate hyperemia to all 5 digits of the right foot.         TIAN YOUNG DPM             D: 2018   T: 2018   MT: ANDREA      Name:     BEATRIZ RAMOS   MRN:      [id number]        Account:        [id number]   :      1985           Procedure Date: 2018      Document: [id number]

## 2018-04-06 NOTE — ANESTHESIA CARE TRANSFER NOTE
Patient: Lida Blood    Procedure(s):  Right ankle stabilization with Arthrex internal brace, drill fibula with peroneal tendon re-grooving, peroneal tendon repair - Wound Class: I-Clean   - Wound Class: I-Clean    Diagnosis: Right ankle instability, peroneal tendon subluxation, peroneal tendinitis  Diagnosis Additional Information: No value filed.    Anesthesia Type:   General, LMA, Periph. Nerve Block for postop pain     Note:  Airway :Face Mask  Patient transferred to:PACU  Handoff Report: Identifed the Patient, Identified the Reponsible Provider, Reviewed the pertinent medical history, Discussed the surgical course, Reviewed Intra-OP anesthesia mangement and issues during anesthesia, Set expectations for post-procedure period and Allowed opportunity for questions and acknowledgement of understanding      Vitals: (Last set prior to Anesthesia Care Transfer)    CRNA VITALS  4/6/2018 1031 - 4/6/2018 1106      4/6/2018             Pulse: 70    SpO2: 98 %                Electronically Signed By: KATE Murphy CRNA  April 6, 2018  11:06 AM

## 2018-04-06 NOTE — ANESTHESIA PREPROCEDURE EVALUATION
Anesthesia Evaluation     . Pt has had prior anesthetic. Type: General    History of anesthetic complications   - PONV        ROS/MED HX    ENT/Pulmonary:       Neurologic:     (+)migraines,     Cardiovascular:     (+) Dyslipidemia, ----. : . . . :. .       METS/Exercise Tolerance:     Hematologic:  - neg hematologic  ROS       Musculoskeletal:  - neg musculoskeletal ROS       GI/Hepatic: Comment: S/P gastric bypass    (+) GERD Asymptomatic on medication, appendicitis,       Renal/Genitourinary:         Endo:     (+) thyroid problem hypothyroidism, .      Psychiatric:     (+) psychiatric history anxiety and depression      Infectious Disease:  - neg infectious disease ROS       Malignancy:      - no malignancy   Other:    - neg other ROS                 Physical Exam  Normal systems: cardiovascular, pulmonary and dental    Airway   Mallampati: II  TM distance: >3 FB  Neck ROM: full    Dental     Cardiovascular   Rhythm and rate: regular and normal      Pulmonary    breath sounds clear to auscultation                    Anesthesia Plan      History & Physical Review  History and physical reviewed and following examination; no interval change.    ASA Status:  2 .    NPO Status:  > 6 hours    Plan for General, LMA and Periph. Nerve Block for postop pain with Propofol induction. Maintenance will be TIVA.    PONV prophylaxis:  Ondansetron (or other 5HT-3), Dexamethasone or Solumedrol and Scopolamine patch       Postoperative Care  Postoperative pain management:  IV analgesics and Peripheral nerve block (Single Shot).      Consents  Anesthetic plan, risks, benefits and alternatives discussed with:  Patient.  Use of blood products discussed: No .   .                          .

## 2018-04-06 NOTE — DISCHARGE INSTRUCTIONS
Post Operative Instructions following a Femoral, Popliteal or Ankle Block  Regional anesthesia is injected into or around the nerves to anesthetize the area supplied by that set of nerves.  It is a type of local anesthetic used to numb a specific area, and is used to control pain and decrease the need for narcotics following surgery.  Types of Regional Blocks:  Femoral: An anesthetic medication injected into the groin area of the operative leg of the patient having knee surgery.  Popliteal: An anesthetic medication injection into the back of the knee of the operative leg of the patient having foot surgery.  Ankle: An anesthetic medication injected into the ankle of the operative leg of a patient having toe surgery.  Procedure:  The type of anesthesia your provide used to numb your leg will usually not wear off for 4-6 hours, but may last as long 12 hours or more.  You should be careful during that period, since it is possible to injure your leg without being aware of the injury.  While your leg is numb, you should:    Use crutches (no weight bearing until the block is completely worn off)    Avoid striking or bumping your leg    Avoid extreme hot or cold  Discomfort:  You will have a tingling and prickly sensation in your leg as the feeling begins to return; you can also expect some discomfort.  The amount of discomfort is unpredictable but if you have more pain than can be controlled with pain medication you may have received, you should notify your physician.  We strongly recommend starting your pain medication at bedtime if you haven t already done so.  This is in the event that the block wears off while you are asleep.    DISCHARGE INSTRUCTIONS FOR PATIENT   SCOPOLAMINE TRANSDERMAL PATCH  You may leave the patch on behind your ear for three days, but NO LONGER. You may have withdrawal symptoms (nausea, vomiting, headache, dizziness) if used longer.  When you remove the patch, you may wash and dry your hands  thoroughly and before touching your eyes, as pupil may dilate.  Discard patch (away from children and pets).  You may develop some urinary hesitancy or urine retention.  Same-Day Surgery   Adult Discharge Orders & Instructions - After Anesthesia    For 24 hours after surgery    1. Get plenty of rest.  A responsible adult must stay with you for at least 24 hours after you leave the hospital.   2. Do not drive or use heavy equipment.  If you have weakness or tingling, don't drive or use heavy equipment until this feeling goes away.  3. Do not drink alcohol.  4. Avoid strenuous or risky activities.  Ask for help when climbing stairs.   5. You may feel lightheaded.  IF so, sit for a few minutes before standing.  Have someone help you get up.   6. You may have a slight fever. Call the doctor if your fever is over 100 F (37.7 C) (taken under the tongue) or lasts longer than 24 hours.  7. You may have a dry mouth, a sore throat, muscle aches or trouble sleeping.  These should go away after 24 hours.  8. Do not make important or legal decisions.  Based on the surgery/procedure that you had today, we do not anticipate that you will have any problems.  However, given the various responses that patients can have to the surgical experience, we want to ensure that you have information available to manage pain or nausea and what to do if you observe bleeding or you develop any signs and symptoms of infection:  Methods to control pain include:  Prescription pain medication or over the counter medications as prescribed or suggested by your physician.  In addition, ice packs and periods of rest are often helpful.  If your pain is not managed with the above methods, contact your physician.  Methods to control nausea include:  Anti-nausea medication approved by your physician.  Drink clear liquids such as apple juice, ginger ale, broth or 7-Up. Be sure to drink enough fluids.  Move to a regular diet as you feel able.  Rest may also  help.  Bleeding:  It's not uncommon to see a little blood staining on the dressing, about the size of a quarter in the first 24 hours; if you see this, there is no reason to be alarmed.  However, should this continue to increase in size, apply pressure if able, ant notify your physician.  Infection:  We do not anticipate that you will acquire an infection, but if you should experience any of the following symptoms:  redness, swelling, heat, increasing pain or abnormal drainage at your surgery site, fever or chills, please notify your physician.    Call your doctor for any of the followin.  Signs of infection (fever, growing tenderness at the surgery site, a large amount of drainage or bleeding, severe pain, foul-smelling drainage, redness, swelling).    2. It has been over 8 to 10 hours since surgery and you are still not able to urinate (pass water).    3.  Headache for over 24 hours.    For concerns, call the [facility]-- [phone]-- available 24 hours a day

## 2018-04-06 NOTE — IP AVS SNAPSHOT
MRN:[id number]                      After Visit Summary   4/6/2018    Lida Blood    MRN: [id number]           Thank you!     Thank you for choosing [facility] for your care. Our goal is always to provide you with excellent care. Hearing back from our patients is one way we can continue to improve our services. Please take a few minutes to complete the written survey that you may receive in the mail after you visit with us. Thank you!        Patient Information     Date Of Birth          1985        About your hospital stay     You were admitted on:  April 6, 2018 You last received care in the:  [facility] Phase [facility]    You were discharged on:  April 6, 2018       Who to Call     For medical emergencies, please call 911.  For non-urgent questions about your medical care, please call your primary care provider or clinic, [phone]  For questions related to your surgery, please call your surgery clinic        Attending Provider     Provider Delgado Andrew DPM Podiatry       Primary Care Provider Office Phone # Fax #    Joaquin Dudley [phone]      After Care Instructions     Discharge Instructions       Review discharge instructions as directed by Provider.            Elevate affected extremity           Ice to affected area       Ice pack to affected area PRN (as needed).            No Alcohol       for 24 hours post-procedure            No dressing change       until follow up clinic appointment.            No driving or operating machinery       until the day after procedure            No weight bearing       No weight bearing right foot                  Your next 10 appointments already scheduled     Apr 11, 2018  9:15 AM CDT   [facility]    [address] [facility] [address]   [phone]            Apr 19, 2018  9:15 AM CDT   Return Visit with Delgado  Ibrahima Martinez DPM   [facility]    [address] [facility] [address] [phone]              Further instructions from your care team       Post Operative Instructions following a Femoral, Popliteal or Ankle Block  Regional anesthesia is injected into or around the nerves to anesthetize the area supplied by that set of nerves.  It is a type of local anesthetic used to numb a specific area, and is used to control pain and decrease the need for narcotics following surgery.  Types of Regional Blocks:  Femoral: An anesthetic medication injected into the groin area of the operative leg of the patient having knee surgery.  Popliteal: An anesthetic medication injection into the back of the knee of the operative leg of the patient having foot surgery.  Ankle: An anesthetic medication injected into the ankle of the operative leg of a patient having toe surgery.  Procedure:  The type of anesthesia your provide used to numb your leg will usually not wear off for 4-6 hours, but may last as long 12 hours or more.  You should be careful during that period, since it is possible to injure your leg without being aware of the injury.  While your leg is numb, you should:    Use crutches (no weight bearing until the block is completely worn off)    Avoid striking or bumping your leg    Avoid extreme hot or cold  Discomfort:  You will have a tingling and prickly sensation in your leg as the feeling begins to return; you can also expect some discomfort.  The amount of discomfort is unpredictable but if you have more pain than can be controlled with pain medication you may have received, you should notify your physician.  We strongly recommend starting your pain medication at bedtime if you haven t already done so.  This is in the event that the block wears off while you are asleep.    DISCHARGE INSTRUCTIONS FOR PATIENT   SCOPOLAMINE TRANSDERMAL PATCH  You may leave the patch on  behind your ear for three days, but NO LONGER. You may have withdrawal symptoms (nausea, vomiting, headache, dizziness) if used longer.  When you remove the patch, you may wash and dry your hands thoroughly and before touching your eyes, as pupil may dilate.  Discard patch (away from children and pets).  You may develop some urinary hesitancy or urine retention.  Same-Day Surgery   Adult Discharge Orders & Instructions - After Anesthesia    For 24 hours after surgery    1. Get plenty of rest.  A responsible adult must stay with you for at least 24 hours after you leave the hospital.   2. Do not drive or use heavy equipment.  If you have weakness or tingling, don't drive or use heavy equipment until this feeling goes away.  3. Do not drink alcohol.  4. Avoid strenuous or risky activities.  Ask for help when climbing stairs.   5. You may feel lightheaded.  IF so, sit for a few minutes before standing.  Have someone help you get up.   6. You may have a slight fever. Call the doctor if your fever is over 100 F (37.7 C) (taken under the tongue) or lasts longer than 24 hours.  7. You may have a dry mouth, a sore throat, muscle aches or trouble sleeping.  These should go away after 24 hours.  8. Do not make important or legal decisions.  Based on the surgery/procedure that you had today, we do not anticipate that you will have any problems.  However, given the various responses that patients can have to the surgical experience, we want to ensure that you have information available to manage pain or nausea and what to do if you observe bleeding or you develop any signs and symptoms of infection:  Methods to control pain include:  Prescription pain medication or over the counter medications as prescribed or suggested by your physician.  In addition, ice packs and periods of rest are often helpful.  If your pain is not managed with the above methods, contact your physician.  Methods to control nausea include:  Anti-nausea  medication approved by your physician.  Drink clear liquids such as apple juice, ginger ale, broth or 7-Up. Be sure to drink enough fluids.  Move to a regular diet as you feel able.  Rest may also help.  Bleeding:  It's not uncommon to see a little blood staining on the dressing, about the size of a quarter in the first 24 hours; if you see this, there is no reason to be alarmed.  However, should this continue to increase in size, apply pressure if able, ant notify your physician.  Infection:  We do not anticipate that you will acquire an infection, but if you should experience any of the following symptoms:  redness, swelling, heat, increasing pain or abnormal drainage at your surgery site, fever or chills, please notify your physician.    Call your doctor for any of the followin.  Signs of infection (fever, growing tenderness at the surgery site, a large amount of drainage or bleeding, severe pain, foul-smelling drainage, redness, swelling).    2. It has been over 8 to 10 hours since surgery and you are still not able to urinate (pass water).    3.  Headache for over 24 hours.    For concerns, call the [facility]-- [phone]-- available 24 hours a day    Pending Results     No orders found from 2018 to 2018.            Admission Information     Date & Time Provider Department Dept. Phone    2018 Delgado Martinez DPM [facility] Phase [phone]      Your Vitals Were     Blood Pressure Pulse Temperature Respirations Last Period Pulse Oximetry    107/68 75 98.3  F (36.8  C) (Temporal) 12 2008 97%      MyChart Information     [facility] gives you secure access to your electronic health record. If you see a primary care provider, you can also send messages to your care team and make appointments. If you have questions, please call your primary care clinic.  If you do not have a primary care provider, please call [phone] and they will assist you.        Care  EveryWhere ID     This is your Care EveryWhere ID. This could be used by other organizations to access your [facility] medical records  [id number]        Equal Access to Services     KAYDEN MORA : Dayana Vega, asepn gambino, abbieurszula jainmichelle desiraedanuta, waxporfirio javierin hayaaevette merrillmaria guadalupe thompson jaye vergara. So [facility] [phone].    ATENCIÓN: Si habla español, tiene a goldsmith disposición servicios gratuitos de asistencia lingüística. Llame al [phone].    We comply with applicable federal civil rights laws and [place] laws. We do not discriminate on the basis of race, color, national origin, age, disability, sex, sexual orientation, or gender identity.               Review of your medicines      START taking        Dose / Directions    hydrOXYzine 25 MG tablet   Commonly known as:  ATARAX   Used for:  Right ankle instability, Peroneal tendinitis of right lower extremity        Dose:  25 mg   Take 1 tablet (25 mg) by mouth every 6 hours as needed for itching (and nausea)   Quantity:  30 tablet   Refills:  3       oxyCODONE-acetaminophen 5-325 MG per tablet   Commonly known as:  PERCOCET   Used for:  Right ankle instability, Peroneal tendinitis of right lower extremity        Dose:  1-2 tablet   Take 1-2 tablets by mouth every 4 hours as needed for pain (moderate to severe)   Quantity:  45 tablet   Refills:  0       senna-docusate 8.6-50 MG per tablet   Commonly known as:  SENOKOT-S;PERICOLACE   Used for:  Right ankle instability, Peroneal tendinitis of right lower extremity        Dose:  1-2 tablet   Take 1-2 tablets by mouth 2 times daily Take while on oral narcotics to prevent or treat constipation.   Quantity:  30 tablet   Refills:  3         CONTINUE these medicines which have NOT CHANGED        Dose / Directions    CALCIUM PO        Take by mouth daily   Refills:  0       escitalopram 20 MG tablet   Commonly known as:  LEXAPRO   Used for:  Major depressive disorder, single episode, moderate (H)         Dose:  20 mg   Take 1 tablet (20 mg) by mouth daily   Quantity:  90 tablet   Refills:  1       estradiol 0.075 MG/24HR Ptwk   Used for:  Symptomatic menopausal or female climacteric states        Dose:  1 patch   Place 1 patch onto the skin once a week   Quantity:  12 patch   Refills:  3       * levothyroxine 200 MCG tablet   Commonly known as:  SYNTHROID/LEVOTHROID   Used for:  Acquired hypothyroidism        Dose:  200 mcg   Take 1 tablet (200 mcg) by mouth daily   Quantity:  30 tablet   Refills:  1       * levothyroxine 75 MCG tablet   Commonly known as:  SYNTHROID/LEVOTHROID   Used for:  Acquired hypothyroidism        Dose:  75 mcg   Take 1 tablet (75 mcg) by mouth daily Take along with 200 mcg tablet daily.   Quantity:  30 tablet   Refills:  1       * levothyroxine 25 mcg/mL Susp   Commonly known as:  SYNTHROID   Used for:  Acquired hypothyroidism        Dose:  275 mcg   Take 11 mLs (275 mcg) by mouth every morning (before breakfast)   Quantity:  330 mL   Refills:  1       OMEPRAZOLE PO        Dose:  40 mg   Take 40 mg by mouth 2 times daily (before meals)   Refills:  0       VITAMIN D PO        Take by mouth daily   Refills:  0       * Notice:  This list has 3 medication(s) that are the same as other medications prescribed for you. Read the directions carefully, and ask your doctor or other care provider to review them with you.         Where to get your medicines      These medications were sent to [facility] [place] - [address]   [address]  [facility] [address]     Phone:  [phone]     hydrOXYzine 25 MG tablet    senna-docusate 8.6-50 MG per tablet         Some of these will need a paper prescription and others can be bought over the counter. Ask your nurse if you have questions.     Bring a paper prescription for each of these medications     oxyCODONE-acetaminophen 5-325 MG per tablet                Protect others around you: Learn how to safely use, store and throw  away your medicines at www.disposemymeds.org.        Information about OPIOIDS     PRESCRIPTION OPIOIDS: WHAT YOU NEED TO KNOW    Prescription opioids can be used to help relieve moderate to severe pain and are often prescribed following a surgery or injury, or for certain health conditions. These medications can be an important part of treatment but also come with serious risks. It is important to work with your health care provider to make sure you are getting the safest, most effective care.    WHAT ARE THE RISKS AND SIDE EFFECTS OF OPIOID USE?  Prescription opioids carry serious risks of addiction and overdose, especially with prolonged use. An opioid overdose, often marked by slowed breathing can cause sudden death. The use of prescription opioids can have a number of side effects as well, even when taken as directed:      Tolerance - meaning you might need to take more of a medication for the same pain relief    Physical dependence - meaning you have symptoms of withdrawal when a medication is stopped    Increased sensitivity to pain    Constipation    Nausea, vomiting, and dry mouth    Sleepiness and dizziness    Confusion    Depression    Low levels of testosterone that can result in lower sex drive, energy, and strength    Itching and sweating    RISKS ARE GREATER WITH:    History of drug misuse, substance use disorder, or overdose    Mental health conditions (such as depression or anxiety)    Sleep apnea    Older age (65 years or older)    Pregnancy    Avoid alcohol while taking prescription opioids.   Also, unless specifically advised by your health care provider, medications to avoid include:    Benzodiazepines (such as Xanax or Valium)    Muscle relaxants (such as Soma or Flexeril)    Hypnotics (such as Ambien or Lunesta)    Other prescription opioids    KNOW YOUR OPTIONS:  Talk to your health care provider about ways to manage your pain that do not involve prescription opioids. Some of these options  may actually work better and have fewer risks and side effects:    Pain relievers such as acetaminophen, ibuprofen, and naproxen    Some medications that are also used for depression or seizures    Physical therapy and exercise    Cognitive behavioral therapy, a psychological, goal-directed approach, in which patients learn how to modify physical, behavioral, and emotional triggers of pain and stress    IF YOU ARE PRESCRIBED OPIOIDS FOR PAIN:    Never take opioids in greater amounts or more often than prescribed    Follow up with your primary health care provider and work together to create a plan on how to manage your pain.    Talk about ways to help manage your pain that do not involve prescription opioids    Talk about all concerns and side effects    Help prevent misuse and abuse    Never sell or share prescription opioids    Never use another person's prescription opioids    Store prescription opioids in a secure place and out of reach of others (this may include visitors, children, friends, and family)    Visit www.cdc.gov/drugoverdose to learn about risks of opioid abuse and overdose    If you believe you may be struggling with addiction, tell your health care provider and ask for guidance or call [facility]'s National Helpline at 4-344-307-HELP    LEARN MORE / www.cdc.gov/drugoverdose/prescribing/guideline.html    Safely dispose of unused prescription opioids: Find your local drug take-back programs and more information about the importance of safe disposal at www.doseofreality.[place].gov             Medication List: This is a list of all your medications and when to take them. Check marks below indicate your daily home schedule. Keep this list as a reference.      Medications           Morning Afternoon Evening Bedtime As Needed    CALCIUM PO   Take by mouth daily                                escitalopram 20 MG tablet   Commonly known as:  LEXAPRO   Take 1 tablet (20 mg) by mouth daily                                 estradiol 0.075 MG/24HR Ptwk   Place 1 patch onto the skin once a week                                hydrOXYzine 25 MG tablet   Commonly known as:  ATARAX   Take 1 tablet (25 mg) by mouth every 6 hours as needed for itching (and nausea)                                * levothyroxine 200 MCG tablet   Commonly known as:  SYNTHROID/LEVOTHROID   Take 1 tablet (200 mcg) by mouth daily                                * levothyroxine 75 MCG tablet   Commonly known as:  SYNTHROID/LEVOTHROID   Take 1 tablet (75 mcg) by mouth daily Take along with 200 mcg tablet daily.                                * levothyroxine 25 mcg/mL Susp   Commonly known as:  SYNTHROID   Take 11 mLs (275 mcg) by mouth every morning (before breakfast)                                OMEPRAZOLE PO   Take 40 mg by mouth 2 times daily (before meals)                                oxyCODONE-acetaminophen 5-325 MG per tablet   Commonly known as:  PERCOCET   Take 1-2 tablets by mouth every 4 hours as needed for pain (moderate to severe)                                senna-docusate 8.6-50 MG per tablet   Commonly known as:  SENOKOT-S;PERICOLACE   Take 1-2 tablets by mouth 2 times daily Take while on oral narcotics to prevent or treat constipation.                                VITAMIN D PO   Take by mouth daily                                * Notice:  This list has 3 medication(s) that are the same as other medications prescribed for you. Read the directions carefully, and ask your doctor or other care provider to review them with you.

## 2018-04-09 ENCOUNTER — MYC MEDICAL ADVICE (OUTPATIENT)
Dept: PODIATRY | Facility: OTHER | Age: 33
End: 2018-04-09

## 2018-04-10 ENCOUNTER — MYC MEDICAL ADVICE (OUTPATIENT)
Dept: PODIATRY | Facility: OTHER | Age: 33
End: 2018-04-10

## 2018-04-10 NOTE — TELEPHONE ENCOUNTER
I called patient and emergency contact.  No answers.    Severe increased pain in back of leg or calf, or feeling of red hot swollen calf then report to ED for ultrasound or DVT eval.   Will try again tomorrow

## 2018-04-10 NOTE — TELEPHONE ENCOUNTER
"I called pt. She is having lots of pain. \"I was expecting pain but not this bad!\" Has leg elevated on 4 pillows. Icing on lateral side of foot.Meds: Percocet 2 tabs q4hrs; Atarax q6hrs; Ibuprofen 600mg q4hrs. Good sensation in toes.  I suggested that pt put ice behind her knee and do more around her ankle. I said I would leave the message for provider to see and call her again with his reply. NAGA Montalvo     "

## 2018-04-11 ENCOUNTER — OFFICE VISIT (OUTPATIENT)
Dept: PODIATRY | Facility: OTHER | Age: 33
End: 2018-04-11
Payer: COMMERCIAL

## 2018-04-11 ENCOUNTER — MYC MEDICAL ADVICE (OUTPATIENT)
Dept: PODIATRY | Facility: CLINIC | Age: 33
End: 2018-04-11

## 2018-04-11 VITALS
TEMPERATURE: 96.7 F | HEIGHT: 65 IN | BODY MASS INDEX: 24.99 KG/M2 | SYSTOLIC BLOOD PRESSURE: 120 MMHG | DIASTOLIC BLOOD PRESSURE: 68 MMHG | WEIGHT: 150 LBS

## 2018-04-11 DIAGNOSIS — M25.371 RIGHT ANKLE INSTABILITY: ICD-10-CM

## 2018-04-11 DIAGNOSIS — M76.71 PERONEAL TENDINITIS OF RIGHT LOWER EXTREMITY: ICD-10-CM

## 2018-04-11 PROCEDURE — 99024 POSTOP FOLLOW-UP VISIT: CPT | Performed by: PODIATRIST

## 2018-04-11 RX ORDER — OXYCODONE AND ACETAMINOPHEN 5; 325 MG/1; MG/1
1-2 TABLET ORAL EVERY 4 HOURS PRN
Qty: 45 TABLET | Refills: 0 | Status: SHIPPED | OUTPATIENT
Start: 2018-04-11 | End: 2018-04-19

## 2018-04-11 RX ORDER — CEPHALEXIN 500 MG/1
500 CAPSULE ORAL 4 TIMES DAILY
Qty: 40 CAPSULE | Refills: 0 | Status: SHIPPED | OUTPATIENT
Start: 2018-04-11 | End: 2018-04-19

## 2018-04-11 ASSESSMENT — PAIN SCALES - GENERAL: PAINLEVEL: SEVERE PAIN (7)

## 2018-04-11 NOTE — LETTER
"    4/11/2018        RE: Lida Blood  [address] [facility] [address]        Chief Complaint   Patient presents with     Surgical Followup     (5d) NWB w/splint, pain 7, lateral ankle burning, no falls or feverDOS 4/6/2018 - Right ankle stabilization with Arthrex internal brace, drill fibula with peroneal tendon re-grooving, peroneal tendon repair     BMI is normal.    Subjective: Patient reports for followup of DOS 4/6/2018 - Right ankle stabilization with Arthrex internal brace, drill fibula with peroneal tendon re-grooving, peroneal tendon repair debridement procedure.  Patient continues pain medication but is tapering off. Patient denies nausea vomiting fever or chills.  Patient denies nausea vomiting fever chills.  She still has quite a bit of her 45 tablets of Percocet left.  She is eating and has normal bowel movements.  Utilizing Atarax Percocet Senokot.  She had increased pain on Saturday Sunday but now it is much better than yesterday.    EXAM:  No apparent distress, patient is relaxed and comfortable.   Vitals: /68 (BP Location: Left arm, Cuff Size: Adult Regular)  Temp 96.7  F (35.9  C) (Temporal)  Ht 5' 5.2\" (1.656 m)  Wt 150 lb (68 kg)  LMP 09/01/2008  BMI 24.81 kg/m2  Vasc:  DP and PT pulses palpable bilateral, CFT immediate to all digits and surrounding the surgical site.  Neuro:  Light touch sensation intact about the digits. There is minimal to no appreciable numbness around the surgical incision with examination.  Derm: The incision is well approximated and dry. Sutures are intact. Mild edema as expected, there is some discomfort with edema about the dorsal second third fourth metatarsal phalangeal joints.  There is no surrounding erythema, heat, drainage or other signs of infection or hematoma.  Musc: Adequate reduction of deformity identified. No complications.    Assessment:      ICD-10-CM    1. Right ankle instability M25.371 oxyCODONE-acetaminophen (PERCOCET) 5-325 " MG per tablet     cephALEXin (KEFLEX) 500 MG capsule     order for DME   2. Peroneal tendinitis of right lower extremity M76.71 oxyCODONE-acetaminophen (PERCOCET) 5-325 MG per tablet     cephALEXin (KEFLEX) 500 MG capsule     order for DME       Plan:    4/11/2018  The dressing was removed and surgical site inspected.   A bulky sterile compression dressing multilayer compression dressing was applied  She was placed back in her posterior splint  Begin oral antibiotics secondary to about 10 mL's of dry blood in the dressing, increased pain on Saturday, this may have been from the block wearing off.  She also notes packing her entire foot and ice after putting a garbage bag on really helped discomfort and she was instructed to do so as needed  Refilled Percocet  Remain strictly nonweightbearing with foot elevated at or above heart impact the entire extremity and ice after protecting it with a garbage bag to keep the dressings dry.  Follow-up one week  Reviewed intraoperative findings and answered all questions    Delgado Martinez DPM        Sincerely,        Delgado Martinez DPM

## 2018-04-11 NOTE — NURSING NOTE
"Chief Complaint   Patient presents with     Surgical Followup     (5d) NWB w/splint, pain 7, lateral ankle burning, no falls or feverDOS 4/6/2018 - Right ankle stabilization with Arthrex internal brace, drill fibula with peroneal tendon re-grooving, peroneal tendon repair       Initial /68 (BP Location: Left arm, Cuff Size: Adult Regular)  Temp 96.7  F (35.9  C) (Temporal)  Ht 5' 5.2\" (1.656 m)  Wt 150 lb (68 kg)  LMP 09/01/2008  BMI 24.81 kg/m2 Estimated body mass index is 24.81 kg/(m^2) as calculated from the following:    Height as of this encounter: 5' 5.2\" (1.656 m).    Weight as of this encounter: 150 lb (68 kg).  BP completed using cuff size: regular  Medication Reconciliation: complete    Adeline Schmid CMA, April 11, 2018    "

## 2018-04-11 NOTE — PROGRESS NOTES
"Chief Complaint   Patient presents with     Surgical Followup     (5d) NWB w/splint, pain 7, lateral ankle burning, no falls or feverDOS 4/6/2018 - Right ankle stabilization with Arthrex internal brace, drill fibula with peroneal tendon re-grooving, peroneal tendon repair     BMI is normal.    Subjective: Patient reports for followup of DOS 4/6/2018 - Right ankle stabilization with Arthrex internal brace, drill fibula with peroneal tendon re-grooving, peroneal tendon repair debridement procedure.  Patient continues pain medication but is tapering off. Patient denies nausea vomiting fever or chills.  Patient denies nausea vomiting fever chills.  She still has quite a bit of her 45 tablets of Percocet left.  She is eating and has normal bowel movements.  Utilizing Atarax Percocet Senokot.  She had increased pain on Saturday Sunday but now it is much better than yesterday.    EXAM:  No apparent distress, patient is relaxed and comfortable.   Vitals: /68 (BP Location: Left arm, Cuff Size: Adult Regular)  Temp 96.7  F (35.9  C) (Temporal)  Ht 5' 5.2\" (1.656 m)  Wt 150 lb (68 kg)  LMP 09/01/2008  BMI 24.81 kg/m2  Vasc:  DP and PT pulses palpable bilateral, CFT immediate to all digits and surrounding the surgical site.  Neuro:  Light touch sensation intact about the digits. There is minimal to no appreciable numbness around the surgical incision with examination.  Derm: The incision is well approximated and dry. Sutures are intact. Mild edema as expected, there is some discomfort with edema about the dorsal second third fourth metatarsal phalangeal joints.  There is no surrounding erythema, heat, drainage or other signs of infection or hematoma.  Musc: Adequate reduction of deformity identified. No complications.    Assessment:      ICD-10-CM    1. Right ankle instability M25.371 oxyCODONE-acetaminophen (PERCOCET) 5-325 MG per tablet     cephALEXin (KEFLEX) 500 MG capsule     order for DME   2. Peroneal " tendinitis of right lower extremity M76.71 oxyCODONE-acetaminophen (PERCOCET) 5-325 MG per tablet     cephALEXin (KEFLEX) 500 MG capsule     order for DME       Plan:    4/11/2018  The dressing was removed and surgical site inspected.   A bulky sterile compression dressing multilayer compression dressing was applied  She was placed back in her posterior splint  Begin oral antibiotics secondary to about 10 mL's of dry blood in the dressing, increased pain on Saturday, this may have been from the block wearing off.  She also notes packing her entire foot and ice after putting a garbage bag on really helped discomfort and she was instructed to do so as needed  Refilled Percocet  Remain strictly nonweightbearing with foot elevated at or above heart impact the entire extremity and ice after protecting it with a garbage bag to keep the dressings dry.  Follow-up one week  Reviewed intraoperative findings and answered all questions    Delgado Martinez DPM

## 2018-04-11 NOTE — PATIENT INSTRUCTIONS
Please call [phone] to schedule imaging that was ordered by Dr. Martinez today.  You will need to schedule a follow up appointment with Dr. Martinez a few days following your imaging to discuss results and plan of care by calling [phone].

## 2018-04-11 NOTE — MR AVS SNAPSHOT
After Visit Summary   4/11/2018    Lida Blood    MRN: [id number]           Patient Information     Date Of Birth          1985        Visit Information        Provider Department      4/11/2018 9:15 AM Delgado Martinez DPM [facility]        Today's Diagnoses     Right ankle instability        Peroneal tendinitis of right lower extremity          Care Instructions    Please call [phone] to schedule imaging that was ordered by Dr. Martinez today.  You will need to schedule a follow up appointment with Dr. Martinez a few days following your imaging to discuss results and plan of care by calling [phone].            Follow-ups after your visit        Your next 10 appointments already scheduled     Apr 19, 2018  9:15 AM CDT   [facility] Del[facility] DPM   [facility]    [address] [place] [address] [phone]              Who to contact     If you have questions or need follow up information about today's clinic visit or your schedule please contact [facility] directly at [phone].  Normal or non-critical lab and imaging results will be communicated to you by "[employer]hart, letter or phone within 4 business days after the clinic has received the results. If you do not hear from us within 7 days, please contact the clinic through [employer]t or phone. If you have a critical or abnormal lab result, we will notify you by phone as soon as possible.  Submit refill requests through [employer] or call your pharmacy and they will forward the refill request to us. Please allow 3 business days for your refill to be completed.          Additional Information About Your Visit        "[employer]hart Information     [employer] gives you secure access to your electronic health record. If you see a primary care provider, you can also send messages to your care team and make appointments. If you have questions, please  "call your primary care clinic.  If you do not have a primary care provider, please call [phone] and they will assist you.        Care EveryWhere ID     This is your Care EveryWhere ID. This could be used by other organizations to access your [facility] medical records  [id number]        Your Vitals Were     Temperature Height Last Period BMI (Body Mass Index)          96.7  F (35.9  C) (Temporal) 5' 5.2\" (1.656 m) 09/01/2008 24.81 kg/m2         Blood Pressure from Last 3 Encounters:   04/11/18 120/68   04/06/18 114/65   03/28/18 100/68    Weight from Last 3 Encounters:   04/11/18 150 lb (68 kg)   03/28/18 150 lb (68 kg)   03/21/18 152 lb (68.9 kg)              Today, you had the following     No orders found for display         Today's Medication Changes          These changes are accurate as of 4/11/18  9:47 AM.  If you have any questions, ask your nurse or doctor.               Start taking these medicines.        Dose/Directions    cephALEXin 500 MG capsule   Commonly known as:  KEFLEX   Used for:  Right ankle instability, Peroneal tendinitis of right lower extremity   Started by:  Delgado Martinez DPM        Dose:  500 mg   Take 1 capsule (500 mg) by mouth 4 times daily   Quantity:  40 capsule   Refills:  0       order for DME   Used for:  Right ankle instability, Peroneal tendinitis of right lower extremity   Started by:  Delgado Martinez DPM        One - rollabout or bent knee scooter/wheeled walker with bent knee for non weight bearing status.  Please call [phone] to schedule delivery of the Roll About.   Quantity:  1 Units   Refills:  0            Where to get your medicines      These medications were sent to [facility] [place], [place] - [address]   [address] [facility] [address]     Phone:  [phone]     cephALEXin 500 MG capsule         Some of these will need a paper prescription and others can be bought over the counter.  Ask your nurse if you have questions.     " Bring a paper prescription for each of these medications     order for DME    oxyCODONE-acetaminophen 5-325 MG per tablet                Primary Care Provider Office Phone # Fax #    Joaquin Dudley [phone]       [address] [facility] [address]        Equal Access to Services     KAYDEN MORA : Hadii hang nieto hadbelkiso Soomaali, waaxda luqadaha, qaybta kaalmada adeegyada, garcía limbushralauren vergara. So [facility] [phone].    ATENCIÓN: Si habla español, tiene a goldsmith disposición servicios gratuitos de asistencia lingüística. Llame al [phone].    We comply with applicable federal civil rights laws and [place] laws. We do not discriminate on the basis of race, color, national origin, age, disability, sex, sexual orientation, or gender identity.            Thank you!     Thank you for choosing [facility]  for your care. Our goal is always to provide you with excellent care. Hearing back from our patients is one way we can continue to improve our services. Please take a few minutes to complete the written survey that you may receive in the mail after your visit with us. Thank you!             Your Updated Medication List - Protect others around you: Learn how to safely use, store and throw away your medicines at www.disposemymeds.org.          This list is accurate as of 4/11/18  9:47 AM.  Always use your most recent med list.                   Brand Name Dispense Instructions for use Diagnosis    CALCIUM PO      Take by mouth daily        cephALEXin 500 MG capsule    KEFLEX    40 capsule    Take 1 capsule (500 mg) by mouth 4 times daily    Right ankle instability, Peroneal tendinitis of right lower extremity       escitalopram 20 MG tablet    LEXAPRO    90 tablet    Take 1 tablet (20 mg) by mouth daily    Major depressive disorder, single episode, moderate (H)       estradiol 0.075 MG/24HR Ptwk     12 patch    Place 1 patch onto the skin once a week    Symptomatic  menopausal or female climacteric states       hydrOXYzine 25 MG tablet    ATARAX    30 tablet    Take 1 tablet (25 mg) by mouth every 6 hours as needed for itching (and nausea)    Right ankle instability, Peroneal tendinitis of right lower extremity       IBUPROFEN PO      Take 600 mg by mouth        * levothyroxine 200 MCG tablet    SYNTHROID/LEVOTHROID    30 tablet    Take 1 tablet (200 mcg) by mouth daily    Acquired hypothyroidism       * levothyroxine 75 MCG tablet    SYNTHROID/LEVOTHROID    30 tablet    Take 1 tablet (75 mcg) by mouth daily Take along with 200 mcg tablet daily.    Acquired hypothyroidism       * levothyroxine 25 mcg/mL Susp    SYNTHROID    330 mL    Take 11 mLs (275 mcg) by mouth every morning (before breakfast)    Acquired hypothyroidism       OMEPRAZOLE PO      Take 40 mg by mouth 2 times daily (before meals)        order for DME     1 Units    One - rollabout or bent knee scooter/wheeled walker with bent knee for non weight bearing status.  Please call [phone] to schedule delivery of the Roll About.    Right ankle instability, Peroneal tendinitis of right lower extremity       oxyCODONE-acetaminophen 5-325 MG per tablet    PERCOCET    45 tablet    Take 1-2 tablets by mouth every 4 hours as needed for pain (moderate to severe)    Right ankle instability, Peroneal tendinitis of right lower extremity       senna-docusate 8.6-50 MG per tablet    SENOKOT-S;PERICOLACE    30 tablet    Take 1-2 tablets by mouth 2 times daily Take while on oral narcotics to prevent or treat constipation.    Right ankle instability, Peroneal tendinitis of right lower extremity       VITAMIN D PO      Take by mouth daily        * Notice:  This list has 3 medication(s) that are the same as other medications prescribed for you. Read the directions carefully, and ask your doctor or other care provider to review them with you.

## 2018-04-19 ENCOUNTER — OFFICE VISIT (OUTPATIENT)
Dept: PODIATRY | Facility: CLINIC | Age: 33
End: 2018-04-19
Payer: COMMERCIAL

## 2018-04-19 VITALS
WEIGHT: 150 LBS | TEMPERATURE: 97.8 F | BODY MASS INDEX: 24.99 KG/M2 | SYSTOLIC BLOOD PRESSURE: 110 MMHG | HEIGHT: 65 IN | DIASTOLIC BLOOD PRESSURE: 72 MMHG

## 2018-04-19 DIAGNOSIS — M76.71 PERONEAL TENDINITIS OF RIGHT LOWER EXTREMITY: ICD-10-CM

## 2018-04-19 DIAGNOSIS — M25.371 RIGHT ANKLE INSTABILITY: ICD-10-CM

## 2018-04-19 PROCEDURE — 99024 POSTOP FOLLOW-UP VISIT: CPT | Performed by: PODIATRIST

## 2018-04-19 RX ORDER — OXYCODONE AND ACETAMINOPHEN 5; 325 MG/1; MG/1
1-2 TABLET ORAL EVERY 4 HOURS PRN
Qty: 30 TABLET | Refills: 0 | Status: SHIPPED | OUTPATIENT
Start: 2018-04-19 | End: 2018-04-24

## 2018-04-19 ASSESSMENT — PAIN SCALES - GENERAL: PAINLEVEL: SEVERE PAIN (6)

## 2018-04-19 NOTE — MR AVS SNAPSHOT
After Visit Summary   4/19/2018    Lida Blood    MRN: [id number]           Patient Information     Date Of Birth          1985        Visit Information        Provider Department      4/19/2018 9:15 AM Delgado Martinez DPM [facility]        Today's Diagnoses     Right ankle instability        Peroneal tendinitis of right lower extremity          Care Instructions    Stay in boot and follow up in 2 weeks          Follow-ups after your visit        Your next 10 appointments already scheduled     May 02, 2018  9:00 AM CDT   [facility]    [address] [facility] [address] [phone]            May 30, 2018  8:15 AM CDT   [facility] Visit with Delgado [facility] DPM   [facility]    [address] [facility] [address] [phone]              Who to contact     If you have questions or need follow up information about today's clinic visit or your schedule please contact [facility] directly at [phone].  Normal or non-critical lab and imaging results will be communicated to you by [employer], letter or phone within 4 business days after the clinic has received the results. If you do not hear from us within 7 days, please contact the clinic through [employer] or phone. If you have a critical or abnormal lab result, we will notify you by phone as soon as possible.  Submit refill requests through [employer] or call your pharmacy and they will forward the refill request to us. Please allow 3 business days for your refill to be completed.          Additional Information About Your Visit        MyChart Information     [employer] gives you secure access to your electronic health record. If you see a primary care provider, you can also send messages to your care team and make appointments. If you have questions, please call your primary care  "clinic.  If you do not have a primary care provider, please call [phone] and they will assist you.        Care EveryWhere ID     This is your Care EveryWhere ID. This could be used by other organizations to access your [facility] medical records  [id number]        Your Vitals Were     Temperature Height Last Period BMI (Body Mass Index)          97.8  F (36.6  C) (Temporal) 5' 5.2\" (1.656 m) 09/01/2008 24.81 kg/m2         Blood Pressure from Last 3 Encounters:   04/19/18 110/72   04/11/18 120/68   04/06/18 114/65    Weight from Last 3 Encounters:   04/19/18 150 lb (68 kg)   04/11/18 150 lb (68 kg)   03/28/18 150 lb (68 kg)              Today, you had the following     No orders found for display         Where to get your medicines      Some of these will need a paper prescription and others can be bought over the counter.  Ask your nurse if you have questions.     Bring a paper prescription for each of these medications     oxyCODONE-acetaminophen 5-325 MG per tablet         Information about OPIOIDS     PRESCRIPTION OPIOIDS: WHAT YOU NEED TO KNOW   You have a prescription for an opioid (narcotic) pain medicine. Opioids can cause addiction. If you have a history of chemical dependency of any type, you are at a higher risk of becoming addicted to opioids. Only take this medicine after all other options have been tried. Take it for as short a time and as few doses as possible.     Do not:    Drive. If you drive while taking these medicines, you could be arrested for driving under the influence (DUI).    Operate heavy machinery    Do any other dangerous activities while taking these medicines.     Drink any alcohol while taking these medicines.      Take with any other medicines that contain acetaminophen. Read all labels carefully. Look for the word  acetaminophen  or  Tylenol.  Ask your pharmacist if you have questions or are unsure.    Store your pills in a secure place, locked if possible. We will not replace " any lost or stolen medicine. If you don t finish your medicine, please throw away (dispose) as directed by your pharmacist. The [place] Pollution Control Agency has more information about safe disposal: https://www.pca.[facility].[place]/living-green/managing-unwanted-medications    All opioids tend to cause constipation. Drink plenty of water and eat foods that have a lot of fiber, such as fruits, vegetables, prune juice, apple juice and high-fiber cereal. Take a laxative (Miralax, milk of magnesia, Colace, Senna) if you don t move your bowels at least every other day.          Primary Care Provider Office Phone # Fax #    Joaquin Dudley [phone]       [address] [facility] [address]        Equal Access to Services     KAYDEN MORA : Dayana Vega, wajacobo luqadaha, qaybta kaalmada crow, garcía cee . So [facility] [phone].    ATENCIÓN: Si habla español, tiene a goldsmith disposición servicios gratuitos de asistencia lingüística. Llame al [phone].    We comply with applicable federal civil rights laws and [place] laws. We do not discriminate on the basis of race, color, national origin, age, disability, sex, sexual orientation, or gender identity.            Thank you!     Thank you for choosing [facility]  for your care. Our goal is always to provide you with excellent care. Hearing back from our patients is one way we can continue to improve our services. Please take a few minutes to complete the written survey that you may receive in the mail after your visit with us. Thank you!             Your Updated Medication List - Protect others around you: Learn how to safely use, store and throw away your medicines at www.disposemymeds.org.          This list is accurate as of 4/19/18 11:59 PM.  Always use your most recent med list.                   Brand Name Dispense Instructions for use Diagnosis    CALCIUM PO      Take by mouth daily         escitalopram 20 MG tablet    LEXAPRO    90 tablet    Take 1 tablet (20 mg) by mouth daily    Major depressive disorder, single episode, moderate (H)       estradiol 0.075 MG/24HR Ptwk     12 patch    Place 1 patch onto the skin once a week    Symptomatic menopausal or female climacteric states       hydrOXYzine 25 MG tablet    ATARAX    30 tablet    Take 1 tablet (25 mg) by mouth every 6 hours as needed for itching (and nausea)    Right ankle instability, Peroneal tendinitis of right lower extremity       IBUPROFEN PO      Take 600 mg by mouth        * levothyroxine 200 MCG tablet    SYNTHROID/LEVOTHROID    30 tablet    Take 1 tablet (200 mcg) by mouth daily    Acquired hypothyroidism       * levothyroxine 75 MCG tablet    SYNTHROID/LEVOTHROID    30 tablet    Take 1 tablet (75 mcg) by mouth daily Take along with 200 mcg tablet daily.    Acquired hypothyroidism       * levothyroxine 25 mcg/mL Susp    SYNTHROID    330 mL    Take 11 mLs (275 mcg) by mouth every morning (before breakfast)    Acquired hypothyroidism       OMEPRAZOLE PO      Take 40 mg by mouth 2 times daily (before meals)        [place] [facility]     1 Units    One - rollabout or bent knee scooter/wheeled walker with bent knee for non weight bearing status.  Please call [phone] to schedule delivery of the Roll About.    Right ankle instability, Peroneal tendinitis of right lower extremity       oxyCODONE-acetaminophen 5-325 MG per tablet    PERCOCET    30 tablet    Take 1-2 tablets by mouth every 4 hours as needed for pain (moderate to severe)    Right ankle instability, Peroneal tendinitis of right lower extremity       senna-docusate 8.6-50 MG per tablet    SENOKOT-S;PERICOLACE    30 tablet    Take 1-2 tablets by mouth 2 times daily Take while on oral narcotics to prevent or treat constipation.    Right ankle instability, Peroneal tendinitis of right lower extremity       VITAMIN D PO      Take by mouth daily        * Notice:  This list has 3  medication(s) that are the same as other medications prescribed for you. Read the directions carefully, and ask your doctor or other care provider to review them with you.

## 2018-04-19 NOTE — NURSING NOTE
"Chief Complaint   Patient presents with     Surgical Followup     (1w6d) NWB w/splint, pain 6, lateral ankle ache and tingling, 1 falls or feverDOS 4/6/2018 - Right ankle stabilization with Arthrex internal brace, drill fibula with peroneal tendon re-grooving, peroneal tendon repair; LOV 4/11/2018       Initial /72 (BP Location: Left arm, Cuff Size: Adult Regular)  Temp 97.8  F (36.6  C) (Temporal)  Ht 5' 5.2\" (1.656 m)  Wt 150 lb (68 kg)  LMP 09/01/2008  BMI 24.81 kg/m2 Estimated body mass index is 24.81 kg/(m^2) as calculated from the following:    Height as of this encounter: 5' 5.2\" (1.656 m).    Weight as of this encounter: 150 lb (68 kg).  BP completed using cuff size: regular  Medication Reconciliation: complete    Adeline Schmid CMA, April 19, 2018    "

## 2018-04-19 NOTE — PROGRESS NOTES
"Chief Complaint   Patient presents with     Surgical Followup     (1w6d) NWB w/splint, pain 6, lateral ankle ache and tingling, 1 falls or feverDOS 4/6/2018 - Right ankle stabilization with Arthrex internal brace, drill fibula with peroneal tendon re-grooving, peroneal tendon repair; LOV 4/11/2018     BMI is normal.    Subjective: Patient reports for followup of DOS 4/6/2018 - Right ankle stabilization with Arthrex internal brace, drill fibula with peroneal tendon re-grooving, peroneal tendon repair debridement procedure.  Still using pain meds usu every 5 hours.     EXAM:  No apparent distress, patient is relaxed and comfortable.   Vitals: /72 (BP Location: Left arm, Cuff Size: Adult Regular)  Temp 97.8  F (36.6  C) (Temporal)  Ht 5' 5.2\" (1.656 m)  Wt 150 lb (68 kg)  LMP 09/01/2008  BMI 24.81 kg/m2  Vasc:  DP and PT pulses palpable bilateral, CFT immediate to all digits and surrounding the surgical site.  Neuro:  Light touch sensation intact about the digits. There is minimal to no appreciable numbness around the surgical incision with examination.  Derm: The incision is well approximated and dry. Sutures are intact. Mild edema as expected, there is some discomfort with edema about the dorsal second third fourth metatarsal phalangeal joints.  There is no surrounding erythema, heat, drainage or other signs of infection or hematoma.  Musc: Adequate reduction of deformity identified. No complications.    Assessment:      ICD-10-CM    1. Right ankle instability M25.371 oxyCODONE-acetaminophen (PERCOCET) 5-325 MG per tablet   2. Peroneal tendinitis of right lower extremity M76.71 oxyCODONE-acetaminophen (PERCOCET) 5-325 MG per tablet       Plan:    4/11/2018  The dressing was removed and surgical site inspected.   A bulky sterile compression dressing multilayer compression dressing was applied  She was placed back in her posterior splint  Begin oral antibiotics secondary to about 10 mL's of dry blood in the " dressing, increased pain on Saturday, this may have been from the block wearing off.  She also notes packing her entire foot and ice after putting a garbage bag on really helped discomfort and she was instructed to do so as needed  Refilled Percocet  Remain strictly nonweightbearing with foot elevated at or above heart impact the entire extremity and ice after protecting it with a garbage bag to keep the dressings dry.  Follow-up one week  Reviewed intraoperative findings and answered all questions    4/19/2018  All sutures were removed  Compression dressing was applied  Dispensed a fracture boot utilize day and night even during sleep to keep the ankle at 90.  Stay in the fracture boot can begin weightbearing in the fracture boot when no longer utilizing pain medication refilled pain medication  Follow-up in 2 weeks likely to begin physical therapy.      Delgado Martinez DPM

## 2018-04-20 NOTE — NURSING NOTE
Dispensed 1 Pneumatic Walking Brace, Size Medium, with [facility] agreement signed by patient. Adeline Schmid CMA, April 19, 2018

## 2018-04-24 ENCOUNTER — MYC MEDICAL ADVICE (OUTPATIENT)
Dept: PODIATRY | Facility: OTHER | Age: 33
End: 2018-04-24

## 2018-04-24 ENCOUNTER — MYC REFILL (OUTPATIENT)
Dept: PODIATRY | Facility: OTHER | Age: 33
End: 2018-04-24

## 2018-04-24 DIAGNOSIS — M76.71 PERONEAL TENDINITIS OF RIGHT LOWER EXTREMITY: ICD-10-CM

## 2018-04-24 DIAGNOSIS — M25.371 RIGHT ANKLE INSTABILITY: ICD-10-CM

## 2018-04-24 NOTE — TELEPHONE ENCOUNTER
You can take the front of boot off but not the whole boot.  Remember that you are taking a risk with removing portion of boot.

## 2018-04-24 NOTE — TELEPHONE ENCOUNTER
Sent a my chart message that you will look at this when you are in [place] on Wednesday. Please look for her response for  of paper rx. Adeline Schmid CMA, April 24, 2018

## 2018-04-24 NOTE — TELEPHONE ENCOUNTER
Message from [employer]:  Jannie Anthony Apr 24, 2018 8:05 AM        ----- Message -----   From: Lida Blood   Sent: 4/24/2018 7:55 AM   To: [facility]  Subject: Medication Renewal Request     Original authorizing provider: POLLO Stephenson would like a refill of the following medications:  oxyCODONE-acetaminophen (PERCOCET) 5-325 MG per tablet [Delgado Martinez DPM]    Preferred pharmacy: [place], [place] - [facility] [address]     Comment:  I am needing a refill on oxycodone as Dr. Martinez told me to stay ahead so the pain does not get out of control

## 2018-04-25 RX ORDER — OXYCODONE AND ACETAMINOPHEN 5; 325 MG/1; MG/1
1-2 TABLET ORAL EVERY 4 HOURS PRN
Qty: 30 TABLET | Refills: 0 | Status: SHIPPED | OUTPATIENT
Start: 2018-04-25 | End: 2018-05-02

## 2018-05-02 ENCOUNTER — OFFICE VISIT (OUTPATIENT)
Dept: PODIATRY | Facility: OTHER | Age: 33
End: 2018-05-02
Payer: COMMERCIAL

## 2018-05-02 VITALS
WEIGHT: 150 LBS | SYSTOLIC BLOOD PRESSURE: 98 MMHG | DIASTOLIC BLOOD PRESSURE: 70 MMHG | HEIGHT: 65 IN | BODY MASS INDEX: 24.99 KG/M2 | TEMPERATURE: 97 F

## 2018-05-02 DIAGNOSIS — M76.71 PERONEAL TENDINITIS OF RIGHT LOWER EXTREMITY: ICD-10-CM

## 2018-05-02 DIAGNOSIS — M25.371 RIGHT ANKLE INSTABILITY: ICD-10-CM

## 2018-05-02 PROCEDURE — 99024 POSTOP FOLLOW-UP VISIT: CPT | Performed by: PODIATRIST

## 2018-05-02 RX ORDER — OXYCODONE AND ACETAMINOPHEN 5; 325 MG/1; MG/1
1-2 TABLET ORAL EVERY 4 HOURS PRN
Qty: 20 TABLET | Refills: 0 | Status: SHIPPED | OUTPATIENT
Start: 2018-05-02 | End: 2018-07-02

## 2018-05-02 RX ORDER — OXYCODONE AND ACETAMINOPHEN 5; 325 MG/1; MG/1
1-2 TABLET ORAL EVERY 4 HOURS PRN
Qty: 30 TABLET | Refills: 0 | Status: SHIPPED | OUTPATIENT
Start: 2018-05-02 | End: 2018-05-02

## 2018-05-02 RX ORDER — OXYCODONE AND ACETAMINOPHEN 5; 325 MG/1; MG/1
1 TABLET ORAL EVERY 6 HOURS PRN
Qty: 20 TABLET | Refills: 0 | Status: SHIPPED | OUTPATIENT
Start: 2018-05-02 | End: 2018-07-02

## 2018-05-02 ASSESSMENT — PAIN SCALES - GENERAL: PAINLEVEL: MODERATE PAIN (4)

## 2018-05-02 NOTE — NURSING NOTE
"Chief Complaint   Patient presents with     Surgical Followup     (3w5d) NWB w/tall gray fx boot, pain 4, lateral ankle ache, dorsal foot discomfort and tingling, no falls or fever; DOS 4/6/2018 - Right ankle stabilization with Arthrex internal brace, drill fibula with peroneal tendon re-grooving, peroneal tendon repair; LOV 4/19/2018       Initial BP 98/70 (BP Location: Left arm, Cuff Size: Adult Regular)  Temp 97  F (36.1  C) (Temporal)  Ht 5' 5.2\" (1.656 m)  Wt 150 lb (68 kg)  LMP 09/01/2008  BMI 24.81 kg/m2 Estimated body mass index is 24.81 kg/(m^2) as calculated from the following:    Height as of this encounter: 5' 5.2\" (1.656 m).    Weight as of this encounter: 150 lb (68 kg).  BP completed using cuff size: regular  Medication Reconciliation: viviana Schmid CMA, May 2, 2018    "

## 2018-05-02 NOTE — MR AVS SNAPSHOT
"              After Visit Summary   5/2/2018    Lida Blood    MRN: [id number]           Patient Information     Date Of Birth          1985        Visit Information        Provider Department      5/2/2018 9:00 AM Delgado Martinez DPM [facility]        Today's Diagnoses     Right ankle instability        Peroneal tendinitis of right lower extremity           Follow-ups after your visit        Additional Services     PHYSICAL THERAPY REFERRAL       *This therapy referral will be filtered to a centralized scheduling office at [facility] and the patient will receive a call to schedule an appointment at a [facility] location most convenient for them. *     [facility] provides Physical Therapy evaluation and treatment and many specialty services across the [facility] system.  If requesting a specialty program, please choose from the list below.    If you have not heard from the scheduling office within 2 business days, please call [phone] for all locations, with the exception of [place], please call [phone].  Treatment: Evaluation & Treatment  Special Instructions/Modalities: eval and treat    Special Programs: None    Please be aware that coverage of these services is subject to the terms and limitations of your health insurance plan.  Call member services at your health plan with any benefit or coverage questions.      **Note to Provider:  If you are referring outside of [facility] for the therapy appointment, please list the name of the location in the \"special instructions\" above, print the referral and give to the patient to schedule the appointment.                  Your next 10 appointments already scheduled     May 30, 2018  8:15 AM CDT   Return Visit with Delgado Martinez DPM   [facility]    [address] [facility] [address] [phone]              Who to contact     If you have " "questions or need follow up information about today's clinic visit or your schedule please contact [facility] directly at [phone].  Normal or non-critical lab and imaging results will be communicated to you by MyChart, letter or phone within 4 business days after the clinic has received the results. If you do not hear from us within 7 days, please contact the clinic through [employer] or phone. If you have a critical or abnormal lab result, we will notify you by phone as soon as possible.  Submit refill requests through itBit or call your pharmacy and they will forward the refill request to us. Please allow 3 business days for your refill to be completed.          Additional Information About Your Visit        [employer] Information     itBit gives you secure access to your electronic health record. If you see a primary care provider, you can also send messages to your care team and make appointments. If you have questions, please call your primary care clinic.  If you do not have a primary care provider, please call [phone] and they will assist you.        Care EveryWhere ID     This is your Care EveryWhere ID. This could be used by other organizations to access your [place] medical records  [id number]        Your Vitals Were     Temperature Height Last Period BMI (Body Mass Index)          97  F (36.1  C) (Temporal) 5' 5.2\" (1.656 m) 09/01/2008 24.81 kg/m2         Blood Pressure from Last 3 Encounters:   05/02/18 98/70   04/19/18 110/72   04/11/18 120/68    Weight from Last 3 Encounters:   05/02/18 150 lb (68 kg)   04/19/18 150 lb (68 kg)   04/11/18 150 lb (68 kg)              We Performed the Following     PHYSICAL THERAPY REFERRAL          Today's Medication Changes          These changes are accurate as of 5/2/18  9:32 AM.  If you have any questions, ask your nurse or doctor.               Start taking these medicines.        Dose/Directions    oxyCODONE-acetaminophen 5-325 MG per tablet "   Commonly known as:  PERCOCET   Used for:  Right ankle instability, Peroneal tendinitis of right lower extremity   Started by:  Delgado Martinez DPM        Dose:  1-2 tablet   Take 1-2 tablets by mouth every 4 hours as needed for pain (moderate to severe)   Quantity:  20 tablet   Refills:  0            Where to get your medicines      Some of these will need a paper prescription and others can be bought over the counter.  Ask your nurse if you have questions.     Bring a paper prescription for each of these medications     oxyCODONE-acetaminophen 5-325 MG per tablet               Information about OPIOIDS     PRESCRIPTION OPIOIDS: WHAT YOU NEED TO KNOW   You have a prescription for an opioid (narcotic) pain medicine. Opioids can cause addiction. If you have a history of chemical dependency of any type, you are at a higher risk of becoming addicted to opioids. Only take this medicine after all other options have been tried. Take it for as short a time and as few doses as possible.     Do not:    Drive. If you drive while taking these medicines, you could be arrested for driving under the influence (DUI).    Operate heavy machinery    Do any other dangerous activities while taking these medicines.     Drink any alcohol while taking these medicines.      Take with any other medicines that contain acetaminophen. Read all labels carefully. Look for the word  acetaminophen  or  Tylenol.  Ask your pharmacist if you have questions or are unsure.    Store your pills in a secure place, locked if possible. We will not replace any lost or stolen medicine. If you don t finish your medicine, please throw away (dispose) as directed by your pharmacist. The [place] Pollution Control Agency has more information about safe disposal: https://www.pca.[facility].[place].us/living-green/managing-unwanted-medications    All opioids tend to cause constipation. Drink plenty of water and eat foods that have a lot of fiber, such as fruits,  vegetables, prune juice, apple juice and high-fiber cereal. Take a laxative (Miralax, milk of magnesia, Colace, Senna) if you don t move your bowels at least every other day.          Primary Care Provider Office Phone # Fax #    Joaquin Dudley [phone]       [address] [facility] [address]        Equal Access to Services     [facility] : Hadii aad ku hadasho Soomaali, waaxda luqadaha, qaybta kaalmada adeegyada, waxay idiin hayaan ademaria guadalupe khbushralauren lachuck ah. So [facility] [phone].    ATENCIÓN: Si habla español, tiene a goldsmith disposición servicios gratuitos de asistencia lingüística. Llame al [phone].    We comply with applicable federal civil rights laws and [place] laws. We do not discriminate on the basis of race, color, national origin, age, disability, sex, sexual orientation, or gender identity.            Thank you!     Thank you for choosing [facility]  for your care. Our goal is always to provide you with excellent care. Hearing back from our patients is one way we can continue to improve our services. Please take a few minutes to complete the written survey that you may receive in the mail after your visit with us. Thank you!             Your Updated Medication List - Protect others around you: Learn how to safely use, store and throw away your medicines at www.disposemymeds.org.          This list is accurate as of 5/2/18  9:32 AM.  Always use your most recent med list.                   Brand Name Dispense Instructions for use Diagnosis    CALCIUM PO      Take by mouth daily        escitalopram 20 MG tablet    LEXAPRO    90 tablet    Take 1 tablet (20 mg) by mouth daily    Major depressive disorder, single episode, moderate (H)       estradiol 0.075 MG/24HR Ptwk     12 patch    Place 1 patch onto the skin once a week    Symptomatic menopausal or female climacteric states       hydrOXYzine 25 MG tablet    ATARAX    30 tablet    Take 1 tablet (25 mg) by mouth every 6 hours  as needed for itching (and nausea)    Right ankle instability, Peroneal tendinitis of right lower extremity       IBUPROFEN PO      Take 600 mg by mouth        * levothyroxine 200 MCG tablet    SYNTHROID/LEVOTHROID    30 tablet    Take 1 tablet (200 mcg) by mouth daily    Acquired hypothyroidism       * levothyroxine 75 MCG tablet    SYNTHROID/LEVOTHROID    30 tablet    Take 1 tablet (75 mcg) by mouth daily Take along with 200 mcg tablet daily.    Acquired hypothyroidism       * levothyroxine 25 mcg/mL Susp    SYNTHROID    330 mL    Take 11 mLs (275 mcg) by mouth every morning (before breakfast)    Acquired hypothyroidism       OMEPRAZOLE PO      Take 40 mg by mouth 2 times daily (before meals)        order for DME     1 Units    One - rollabout or bent knee scooter/wheeled walker with bent knee for non weight bearing status.  Please call [phone] to schedule delivery of the Roll About.    Right ankle instability, Peroneal tendinitis of right lower extremity       oxyCODONE-acetaminophen 5-325 MG per tablet    PERCOCET    20 tablet    Take 1-2 tablets by mouth every 4 hours as needed for pain (moderate to severe)    Right ankle instability, Peroneal tendinitis of right lower extremity       senna-docusate 8.6-50 MG per tablet    SENOKOT-S;PERICOLACE    30 tablet    Take 1-2 tablets by mouth 2 times daily Take while on oral narcotics to prevent or treat constipation.    Right ankle instability, Peroneal tendinitis of right lower extremity       VITAMIN D PO      Take by mouth daily        * Notice:  This list has 3 medication(s) that are the same as other medications prescribed for you. Read the directions carefully, and ask your doctor or other care provider to review them with you.

## 2018-05-02 NOTE — PROGRESS NOTES
"Chief Complaint   Patient presents with     Surgical Followup     (3w5d) NWB w/tall gray fx boot, pain 4, lateral ankle ache, dorsal foot discomfort and tingling, no falls or fever; DOS 4/6/2018 - Right ankle stabilization with Arthrex internal brace, drill fibula with peroneal tendon re-grooving, peroneal tendon repair; LOV 4/19/2018     BMI is normal.    Subjective: Patient reports for followup of DOS 4/6/2018 - Right ankle stabilization with Arthrex internal brace, drill fibula with peroneal tendon re-grooving, peroneal tendon repair debridement procedure.      EXAM:  No apparent distress, patient is relaxed and comfortable.   Vitals: BP 98/70 (BP Location: Left arm, Cuff Size: Adult Regular)  Temp 97  F (36.1  C) (Temporal)  Ht 5' 5.2\" (1.656 m)  Wt 150 lb (68 kg)  LMP 09/01/2008  BMI 24.81 kg/m2  Vasc:  DP and PT pulses palpable bilateral, CFT immediate to all digits and surrounding the surgical site.  Neuro:  Light touch sensation intact about the digits. There is minimal to no appreciable numbness around the surgical incision with examination.  Derm: The incision is well approximated and dry.   Musc: Adequate reduction of deformity identified. No complications.  Much reduced edema and tolerable discomfort with very firm palpation along the course of the peroneals and distal fibula    Assessment:      ICD-10-CM    1. Right ankle instability M25.371 PHYSICAL THERAPY REFERRAL     oxyCODONE-acetaminophen (PERCOCET) 5-325 MG per tablet     DISCONTINUED: oxyCODONE-acetaminophen (PERCOCET) 5-325 MG per tablet   2. Peroneal tendinitis of right lower extremity M76.71 PHYSICAL THERAPY REFERRAL     oxyCODONE-acetaminophen (PERCOCET) 5-325 MG per tablet     DISCONTINUED: oxyCODONE-acetaminophen (PERCOCET) 5-325 MG per tablet       Plan:    4/11/2018  The dressing was removed and surgical site inspected.   A bulky sterile compression dressing multilayer compression dressing was applied  She was placed back in her " posterior splint  Begin oral antibiotics secondary to about 10 mL's of dry blood in the dressing, increased pain on Saturday, this may have been from the block wearing off.  She also notes packing her entire foot and ice after putting a garbage bag on really helped discomfort and she was instructed to do so as needed  Refilled Percocet  Remain strictly nonweightbearing with foot elevated at or above heart impact the entire extremity and ice after protecting it with a garbage bag to keep the dressings dry.  Follow-up one week  Reviewed intraoperative findings and answered all questions    4/19/2018  All sutures were removed  Compression dressing was applied  Dispensed a fracture boot utilize day and night even during sleep to keep the ankle at 90.  Stay in the fracture boot can begin weightbearing in the fracture boot when no longer utilizing pain medication refilled pain medication  Follow-up in 2 weeks likely to begin physical therapy.      5/2/2018  Begin PT as tolerated  She may progress out of boot for PT as long as not using narcotic pain meds  Progress to WB in boot  Sleep in boot until 6 weeks po  PT can progress ROM and ACTIVE rom and even WB without boot as tolerated.   Expect no boot at 6 weeks po.   Refilled Percocet to utilize after PT if necessary however minimize use of this.    Delgado Martinez DPM

## 2018-05-04 ENCOUNTER — HOSPITAL ENCOUNTER (OUTPATIENT)
Dept: PHYSICAL THERAPY | Facility: CLINIC | Age: 33
Setting detail: THERAPIES SERIES
End: 2018-05-04
Attending: PODIATRIST
Payer: COMMERCIAL

## 2018-05-04 PROCEDURE — 40000718 ZZHC STATISTIC PT DEPARTMENT ORTHO VISIT: Performed by: PHYSICAL THERAPIST

## 2018-05-04 PROCEDURE — 97530 THERAPEUTIC ACTIVITIES: CPT | Mod: GP | Performed by: PHYSICAL THERAPIST

## 2018-05-04 PROCEDURE — 97161 PT EVAL LOW COMPLEX 20 MIN: CPT | Mod: GP | Performed by: PHYSICAL THERAPIST

## 2018-05-04 PROCEDURE — 97110 THERAPEUTIC EXERCISES: CPT | Mod: GP | Performed by: PHYSICAL THERAPIST

## 2018-05-04 NOTE — PROGRESS NOTES
05/04/18 0900   General Information   Type of Visit Initial OP Ortho PT Evaluation   Start of Care Date 05/04/18   Referring Physician Dr Martinez   Patient/Family Goals Statement to get back to work and to be able to hike again   Orders Evaluate and Treat   Date of Order 05/02/18   Insurance Type [employer]   Medical Diagnosis R ankle instability, Peroneal tendonitis S/P surgery 4/6/18   Surgical/Medical history reviewed Yes   Weight-Bearing Status - RLE weight-bearing as tolerated  (In boot, wean out of boot)   Special Instructions She may progress out of boot for PT as long as not using narcotic pain meds   General Information Comments See Dr Martinez's 5/2 plan   Body Part(s)   Body Part(s) Ankle/Foot   Presentation and Etiology   Pertinent history of current problem (include personal factors and/or comorbidities that impact the POC) Pt reports she underwent ankle surgery (Right ankle stabilization with Arthrex internal brace, drill fibula with peroneal tendon re-grooving, peroneal tendon repair debridement procedure) on the 4/6/18 and since then it has been slowly getting better. She has been doing more IBP than pain meds. Sometimes needs the pain meds at night because has done too much during day (3-4 x per week and only needs 1). takes IBP Q 4 hours. Icing is also helping. PMHX is otherwise noncontributory   Impairments A. Pain;B. Decreased WB tolerance;C. Swelling;D. Decreased ROM;E. Decreased flexibility;F. Decreased strength and endurance;H. Impaired gait   Functional Limitations perform activities of daily living;perform required work activities;perform desired leisure / sports activities   Symptom Location R foot and ankle particularly fibula, lateral aspec and dorsum of foot. inccision under lateral malleolus   How/Where did it occur (surgery 4/6/18)   Onset date of current episode/exacerbation 04/06/18   Chronicity New   Pain rating (0-10 point scale) Best (/10);Worst (/10)   Best (/10) 4   Worst (/10) 7    Pain quality A. Sharp;B. Dull;C. Aching   Frequency of pain/symptoms A. Constant   Pain/symptoms are: Worse during the night   Pain/symptoms exacerbated by B. Walking;G. Certain positions;K. Home tasks;L. Work tasks;J. ADL   Pain/symptoms eased by A. Sitting;C. Rest;E. Changing positions;F. Certain positions;H. Cold;I. OTC medication(s);J. Braces/supports  (prescription medw)   Progression of symptoms since onset: Improved   Prior Level of Function   Prior Level of Function-Mobility indep   Prior Level of Function-ADLs indep   Functional Level Prior Comment indep   Current Level of Function   Current Community Support Family/friend caregiver   Patient role/employment history A. Employed   Employment Comments surgical tech at    Living environment [facility]   Home/community accessibility 3 stairs  to enter the home otherwise 1 level.  currently step by step   Current equipment-Gait/Locomotion Axillary crutches;Other  (fracture boot.  roll about)   Current equipment-ADL Shower/tub chair   Fall Risk Screen   Fall screen completed by PT   Have you fallen 2 or more times in the past year? No   Have you fallen and had an injury in the past year? No   Is patient a fall risk? No   Functional Scales   Functional Scales Other   Other Scales  LEFS   Ankle/Foot Objective Findings   Side (if bilateral, select both right and left) Right   Integumentary Incision at lateral malleolus is CDI and looks good swelling is mild.    Gait/Locomotion decreased WB on R LE due to pain. antalgic due to pain. presents with out her crutches today.   Balance/ Proprioception (Single Leg Stance) Good on L LE, not tested on R   Ankle/Foot ROM Comment As expected postoperatively , doing well   Ankle/Foot Strength Comments No MMT due to recent surgery but pt able to move foot actively through availiable AROM   Palpation malleolus and dorusm of foot are both tender and dyseslthetic as expected   Right Calcanceal Inversion AROM fore foot  inversion L 40 deg, 30 degrees R   Right Calcaneal Eversion AROM Forefoot eversion  L 15 deg 15 deg on R also   Right PF AROM 70 deg L  41 deg on R  In prone with knees bent   Right DF (Knee Ext) AROM 11 on L  10 degrees on R (in prone with knees bent   Right Great Toe Flexion AROM = bilaterally   Observation alert, NAD   Right DF/Inversion Strength AG   Right DF/Eversion Strength AG   Right PF/Inversion Strength AG   Right PF/Eversion Strength AG   Right PF Strength AG   Ankle/Foot Special Tests Comments Special testes not indicated postoperatively   Ankle/Foot Flexibility Comments as expected   Planned Therapy Interventions   Planned Therapy Interventions gait training;manual therapy;neuromuscular re-education;strengthening;ROM   Planned Modality Interventions   Planned Modality Interventions Cryotherapy   Planned Modality Interventions Comments PRN   Clinical Impression   Criteria for Skilled Therapeutic Interventions Met yes, treatment indicated   PT Diagnosis pain, decreased ROM , inhibited strehgth, swelling and decreased propiroception S/P ankle stabilization surgery.    Influenced by the following impairments pain, swelling, decreased ROM and strength    Functional limitations due to impairments pain with walking, AdLS and IADLS   Clinical Presentation Stable/Uncomplicated   Clinical Presentation Rationale Pts PMHX is largely noncontributory    Clinical Decision Making (Complexity) Low complexity   Therapy Frequency 2 times/Week   Predicted Duration of Therapy Intervention (days/wks) 90 days   Risk & Benefits of therapy have been explained Yes   Patient, Family & other staff in agreement with plan of care Yes   Clinical Impression Comments Patient presents with pain, decreased ROM , inhibited strehgth, swelling and decreased propiroception S/P ankle stabilization surgery.  Pt will benefit from skilled PT for instructionin exercises progression and HEP for gait training, strengthening ROM and propreoception .  PT will also benefit from skilled applicaiton of manual techniques and modalities to decrease pain and swelling.    Education Assessment   Preferred Learning Style Listening;Demonstration   Barriers to Learning No barriers   ORTHO GOALS   PT Ortho Eval Goals 1;2;3   Ortho Goal 1   Goal Identifier Walking   Goal Description pt able to walk with no AD and No boot all day with no increase in pain with regular activity.     Target Date 08/02/18   Ortho Goal 2   Goal Identifier Work   Goal Description Pt able to work a 4 hour shift with no increase in ankle pain or swelling    Target Date 08/02/18   Ortho Goal 3   Goal Identifier Recreation   Goal Description Pt able to walk on lebel and unlevel groudn and hike x 1r with out increased pain in hank   Target Date 08/02/18   Total Evaluation Time   Total Evaluation Time 44

## 2018-05-07 ENCOUNTER — HOSPITAL ENCOUNTER (OUTPATIENT)
Dept: PHYSICAL THERAPY | Facility: OTHER | Age: 33
Setting detail: THERAPIES SERIES
End: 2018-05-07
Attending: PODIATRIST
Payer: COMMERCIAL

## 2018-05-07 PROCEDURE — 97110 THERAPEUTIC EXERCISES: CPT | Mod: GP | Performed by: PHYSICAL THERAPIST

## 2018-05-07 PROCEDURE — 40000718 ZZHC STATISTIC PT DEPARTMENT ORTHO VISIT: Performed by: PHYSICAL THERAPIST

## 2018-05-14 ENCOUNTER — HOSPITAL ENCOUNTER (OUTPATIENT)
Dept: PHYSICAL THERAPY | Facility: OTHER | Age: 33
Setting detail: THERAPIES SERIES
End: 2018-05-14
Attending: PODIATRIST
Payer: COMMERCIAL

## 2018-05-14 PROCEDURE — 40000718 ZZHC STATISTIC PT DEPARTMENT ORTHO VISIT: Performed by: PHYSICAL THERAPIST

## 2018-05-14 PROCEDURE — 97110 THERAPEUTIC EXERCISES: CPT | Mod: GP | Performed by: PHYSICAL THERAPIST

## 2018-05-16 ENCOUNTER — MYC MEDICAL ADVICE (OUTPATIENT)
Dept: PODIATRY | Facility: OTHER | Age: 33
End: 2018-05-16

## 2018-05-16 ENCOUNTER — HOSPITAL ENCOUNTER (OUTPATIENT)
Dept: PHYSICAL THERAPY | Facility: OTHER | Age: 33
Setting detail: THERAPIES SERIES
End: 2018-05-16
Attending: PODIATRIST
Payer: COMMERCIAL

## 2018-05-16 PROCEDURE — 40000718 ZZHC STATISTIC PT DEPARTMENT ORTHO VISIT: Performed by: PHYSICAL THERAPIST

## 2018-05-16 PROCEDURE — 97110 THERAPEUTIC EXERCISES: CPT | Mod: GP | Performed by: PHYSICAL THERAPIST

## 2018-05-21 ENCOUNTER — HOSPITAL ENCOUNTER (OUTPATIENT)
Dept: PHYSICAL THERAPY | Facility: OTHER | Age: 33
Setting detail: THERAPIES SERIES
End: 2018-05-21
Attending: PODIATRIST
Payer: COMMERCIAL

## 2018-05-21 PROCEDURE — 40000718 ZZHC STATISTIC PT DEPARTMENT ORTHO VISIT: Performed by: PHYSICAL THERAPIST

## 2018-05-21 PROCEDURE — 97110 THERAPEUTIC EXERCISES: CPT | Mod: GP | Performed by: PHYSICAL THERAPIST

## 2018-05-29 ENCOUNTER — HOSPITAL ENCOUNTER (OUTPATIENT)
Dept: PHYSICAL THERAPY | Facility: OTHER | Age: 33
Setting detail: THERAPIES SERIES
End: 2018-05-29
Attending: PODIATRIST
Payer: COMMERCIAL

## 2018-05-29 PROCEDURE — 97110 THERAPEUTIC EXERCISES: CPT | Mod: GP | Performed by: PHYSICAL THERAPIST

## 2018-05-29 PROCEDURE — 40000718 ZZHC STATISTIC PT DEPARTMENT ORTHO VISIT: Performed by: PHYSICAL THERAPIST

## 2018-05-30 ENCOUNTER — MYC MEDICAL ADVICE (OUTPATIENT)
Dept: PODIATRY | Facility: OTHER | Age: 33
End: 2018-05-30

## 2018-05-30 ENCOUNTER — OFFICE VISIT (OUTPATIENT)
Dept: PODIATRY | Facility: OTHER | Age: 33
End: 2018-05-30
Payer: COMMERCIAL

## 2018-05-30 VITALS
TEMPERATURE: 97.9 F | WEIGHT: 150 LBS | DIASTOLIC BLOOD PRESSURE: 72 MMHG | BODY MASS INDEX: 24.99 KG/M2 | HEIGHT: 65 IN | SYSTOLIC BLOOD PRESSURE: 110 MMHG

## 2018-05-30 DIAGNOSIS — M25.371 RIGHT ANKLE INSTABILITY: Primary | ICD-10-CM

## 2018-05-30 DIAGNOSIS — M76.71 PERONEAL TENDINITIS OF RIGHT LOWER EXTREMITY: ICD-10-CM

## 2018-05-30 PROCEDURE — 99024 POSTOP FOLLOW-UP VISIT: CPT | Performed by: PODIATRIST

## 2018-05-30 ASSESSMENT — PAIN SCALES - GENERAL: PAINLEVEL: NO PAIN (0)

## 2018-05-30 NOTE — PROGRESS NOTES
"Chief Complaint   Patient presents with     Surgical Followup     (7w5d) WB w/tall gray fx boot, no pain; DOS 4/6/2018 - Right ankle stabilization with Arthrex internal brace, drill fibula with peroneal tendon re-grooving, peroneal tendon repair; LOV 5/2/2018     BMI is NORMAL.    Subjective: Patient reports for followup of DOS 4/6/2018 - Right ankle stabilization with Arthrex internal brace, drill fibula with peroneal tendon re-grooving, peroneal tendon repair debridement procedure.      EXAM:  No apparent distress, patient is relaxed and comfortable.   Vitals: /72 (BP Location: Left arm, Cuff Size: Adult Regular)  Temp 97.9  F (36.6  C) (Temporal)  Ht 5' 5.2\" (1.656 m)  Wt 150 lb (68 kg)  LMP 09/01/2008  BMI 24.81 kg/m2  Vasc:  DP and PT pulses palpable bilateral, CFT immediate to all digits and surrounding the surgical site.  Neuro:  Light touch sensation intact about the digits. There is minimal to no appreciable numbness around the surgical incision with examination.  Derm: The incision is well approximated and dry.   Musc: Adequate reduction of deformity identified.  Still some edema through the course of the peroneal tendons and distal fibula but overall this is much improved.  Negative anterior drawer negative talar tilt clinically when compared to the contralateral side and no peroneal subluxation noted.  She is able to perform unilateral toe raise on right surgical side but with less authority than the contralateral side.  She is able to balance on right foot almost as well as left.    Assessment:      ICD-10-CM    1. Right ankle instability M25.371    2. Peroneal tendinitis of right lower extremity M76.71        Plan:    4/11/2018  The dressing was removed and surgical site inspected.   A bulky sterile compression dressing multilayer compression dressing was applied  She was placed back in her posterior splint  Begin oral antibiotics secondary to about 10 mL's of dry blood in the dressing, " increased pain on Saturday, this may have been from the block wearing off.  She also notes packing her entire foot and ice after putting a garbage bag on really helped discomfort and she was instructed to do so as needed  Refilled Percocet  Remain strictly nonweightbearing with foot elevated at or above heart impact the entire extremity and ice after protecting it with a garbage bag to keep the dressings dry.  Follow-up one week  Reviewed intraoperative findings and answered all questions    4/19/2018  All sutures were removed  Compression dressing was applied  Dispensed a fracture boot utilize day and night even during sleep to keep the ankle at 90.  Stay in the fracture boot can begin weightbearing in the fracture boot when no longer utilizing pain medication refilled pain medication  Follow-up in 2 weeks likely to begin physical therapy.      5/2/2018  Begin PT as tolerated  She may progress out of boot for PT as long as not using narcotic pain meds  Progress to WB in boot  Sleep in boot until 6 weeks po  PT can progress ROM and ACTIVE rom and even WB without boot as tolerated.   Expect no boot at 6 weeks po.   Refilled Percocet to utilize after PT if necessary however minimize use of this.    5/30/2018  Stop the boot for all activities  May consider ankle compression brace not long-term  Letter to return to 4 hours of standing or walking per day at work and unlimited seated work, starting in 1 week  Follow-up in 3 weeks and likely will return to all activities    Delgado Martinez DPM

## 2018-05-30 NOTE — LETTER
[facility]  [address] [facility] [address]  [phone]    2018      RE:  Lida Alexandria Ohjuli  : 1985      To whom it may concern:    This patient may return to work on 18 for about 4 hours of walking or standing and unlimited sit down work. I expect about 2-3 weeks of some restrictions.      Sincerely,          Delgado Martinez DPM

## 2018-05-30 NOTE — MR AVS SNAPSHOT
"              After Visit Summary   5/30/2018    Lida Blood    MRN: [id number]           Patient Information     Date Of Birth          1985        Visit Information        Provider Department      5/30/2018 8:15 AM Delgado Martinez DPM [facility]        Today's Diagnoses     Right ankle instability    -  1    Peroneal tendinitis of right lower extremity          Care Instructions    Tri Lock ankle brace is a reliable and sturdy ankle brace. A figure of 8 ankle brace,Procare double strap ankle support or lace up ankle gauntlet or similar brand are most readily available on line, [employer], or AppCard delivered for around $20-40.  Health insurance may not pay for these.         Chronic ankle instability    Diminished ankle proprioception.  Strengthen the muscles that cross the ankle to prevent instability and loss of joint function.    [email or web address].[employer]   Search youBuzzDoesube for \"indoboard girl\" to understand how they work    Bongo board or BAPS from [employer]              Follow-ups after your visit        Your next 10 appointments already scheduled     Jun 04, 2018  9:00 AM CDT   Ortho Treatment with Nessa Madden, PT   [facility] ([facility])    [address] [facility] [address] [phone]              Who to contact     If you have questions or need follow up information about today's clinic visit or your schedule please contact [facility] directly at [phone].  Normal or non-critical lab and imaging results will be communicated to you by MyChart, letter or phone within 4 business days after the clinic has received the results. If you do not hear from us within 7 days, please contact the clinic through MyChart or phone. If you have a critical or abnormal lab result, we will notify you by phone as soon as possible.  Submit refill requests through VenueBook or call your pharmacy and they will forward the refill request to us. Please " "allow 3 business days for your refill to be completed.          Additional Information About Your Visit        MyChart Information     OneMorePallethart gives you secure access to your electronic health record. If you see a primary care provider, you can also send messages to your care team and make appointments. If you have questions, please call your primary care clinic.  If you do not have a primary care provider, please call [phone] and they will assist you.        Care EveryWhere ID     This is your Care EveryWhere ID. This could be used by other organizations to access your [place] medical records  [id number]        Your Vitals Were     Temperature Height Last Period BMI (Body Mass Index)          97.9  F (36.6  C) (Temporal) 5' 5.2\" (1.656 m) 09/01/2008 24.81 kg/m2         Blood Pressure from Last 3 Encounters:   05/30/18 110/72   05/02/18 98/70   04/19/18 110/72    Weight from Last 3 Encounters:   05/30/18 150 lb (68 kg)   05/02/18 150 lb (68 kg)   04/19/18 150 lb (68 kg)              Today, you had the following     No orders found for display       Primary Care Provider Office Phone # Fax #    Joaquin Dudley [phone]       [address] [facility] [address]        Equal Access to Services     KAYDEN MORA : Hadii aad ku hadasho Soomaali, waaxda luqadaha, qaybta kaalmada adeegyada, garcía cee . So [facility] [phone].    ATENCIÓN: Si habla español, tiene a goldsmith disposición servicios gratuitos de asistencia lingüística. ame al [phone].    We comply with applicable federal civil rights laws and [place] laws. We do not discriminate on the basis of race, color, national origin, age, disability, sex, sexual orientation, or gender identity.            Thank you!     Thank you for choosing [facility]  for your care. Our goal is always to provide you with excellent care. Hearing back from our patients is one way we can continue to improve our services. " Please take a few minutes to complete the written survey that you may receive in the mail after your visit with us. Thank you!             Your Updated Medication List - Protect others around you: Learn how to safely use, store and throw away your medicines at www.disposemymeds.org.          This list is accurate as of 5/30/18  8:40 AM.  Always use your most recent med list.                   Brand Name Dispense Instructions for use Diagnosis    CALCIUM PO      Take by mouth daily        escitalopram 20 MG tablet    LEXAPRO    90 tablet    Take 1 tablet (20 mg) by mouth daily    Major depressive disorder, single episode, moderate (H)       estradiol 0.075 MG/24HR Ptwk     12 patch    Place 1 patch onto the skin once a week    Symptomatic menopausal or female climacteric states       hydrOXYzine 25 MG tablet    ATARAX    30 tablet    Take 1 tablet (25 mg) by mouth every 6 hours as needed for itching (and nausea)    Right ankle instability, Peroneal tendinitis of right lower extremity       IBUPROFEN PO      Take 600 mg by mouth        * levothyroxine 200 MCG tablet    SYNTHROID/LEVOTHROID    30 tablet    Take 1 tablet (200 mcg) by mouth daily    Acquired hypothyroidism       * levothyroxine 75 MCG tablet    SYNTHROID/LEVOTHROID    30 tablet    Take 1 tablet (75 mcg) by mouth daily Take along with 200 mcg tablet daily.    Acquired hypothyroidism       * levothyroxine 25 mcg/mL Susp    SYNTHROID    330 mL    Take 11 mLs (275 mcg) by mouth every morning (before breakfast)    Acquired hypothyroidism       OMEPRAZOLE PO      Take 40 mg by mouth 2 times daily (before meals)        order for DME     1 Units    One - rollabout or bent knee scooter/wheeled walker with bent knee for non weight bearing status.  Please call [phone] to schedule delivery of the Roll About.    Right ankle instability, Peroneal tendinitis of right lower extremity       * oxyCODONE-acetaminophen 5-325 MG per tablet    PERCOCET    20 tablet     Take 1-2 tablets by mouth every 4 hours as needed for pain (moderate to severe)    Right ankle instability, Peroneal tendinitis of right lower extremity       * oxyCODONE-acetaminophen 5-325 MG per tablet    PERCOCET    20 tablet    Take 1 tablet by mouth every 6 hours as needed for severe pain maximum 6 tablet(s) per day    Right ankle instability, Peroneal tendinitis of right lower extremity       senna-docusate 8.6-50 MG per tablet    SENOKOT-S;PERICOLACE    30 tablet    Take 1-2 tablets by mouth 2 times daily Take while on oral narcotics to prevent or treat constipation.    Right ankle instability, Peroneal tendinitis of right lower extremity       VITAMIN D PO      Take by mouth daily        * Notice:  This list has 5 medication(s) that are the same as other medications prescribed for you. Read the directions carefully, and ask your doctor or other care provider to review them with you.

## 2018-05-30 NOTE — PATIENT INSTRUCTIONS
"Tri Lock ankle brace is a reliable and sturdy ankle brace. A figure of 8 ankle brace,Procare double strap ankle support or lace up ankle gauntlet or similar brand are most readily available on line, [employer], or [employer] delivered for around $20-40.  Health insurance may not pay for these.         Chronic ankle instability    Diminished ankle proprioception.  Strengthen the muscles that cross the ankle to prevent instability and loss of joint function.    [email or web address]   Search youtube for \"indoboard girl\" to understand how they work    Bongo board or BAPS from [employer]      "

## 2018-05-31 NOTE — TELEPHONE ENCOUNTER
Successfully faxed [facility] Workability Form dated 5/31/2018 from Dr. Martinez @ [phone]. Sent to german. Adeline Schmid CMA, May 31, 2018

## 2018-06-04 ENCOUNTER — HOSPITAL ENCOUNTER (OUTPATIENT)
Dept: PHYSICAL THERAPY | Facility: OTHER | Age: 33
Setting detail: THERAPIES SERIES
End: 2018-06-04
Attending: PODIATRIST
Payer: COMMERCIAL

## 2018-06-04 PROCEDURE — 40000718 ZZHC STATISTIC PT DEPARTMENT ORTHO VISIT: Performed by: PHYSICAL THERAPIST

## 2018-06-04 PROCEDURE — 97110 THERAPEUTIC EXERCISES: CPT | Mod: GP | Performed by: PHYSICAL THERAPIST

## 2018-06-10 DIAGNOSIS — E03.9 ACQUIRED HYPOTHYROIDISM: ICD-10-CM

## 2018-06-11 ENCOUNTER — TELEPHONE (OUTPATIENT)
Dept: PODIATRY | Facility: OTHER | Age: 33
End: 2018-06-11

## 2018-06-11 NOTE — TELEPHONE ENCOUNTER
Successfully faxed workability letter dated 6/11/2018 to [facility] [employer] @ [phone] Adeline Schmid CMA, June 11, 2018

## 2018-06-11 NOTE — TELEPHONE ENCOUNTER
"Requested Prescriptions   Pending Prescriptions Disp Refills     levothyroxine (SYNTHROID/LEVOTHROID) 75 MCG tablet [Pharmacy Med Name: L-THYROXINE (SYNTHROID) TABS 75MCG] 30 tablet 1     Sig: TAKE 1 TABLET DAILY, TAKE ALONG WITH 200 MCG TABLET DAILY (NEEDS FOLLOW UP APPOINTMENT PRIOR TO ADDITIONAL REFILLS)    Thyroid Protocol Failed    6/10/2018  4:38 PM       Failed - Normal TSH on file in past 12 months    Recent Labs   Lab Test  03/28/18   0840   TSH  18.67*             Passed - Patient is 12 years or older       Passed - Recent (12 mo) or future (30 days) visit within the authorizing provider's specialty    Patient had office visit in the last 12 months or has a visit in the next 30 days with authorizing provider or within the authorizing provider's specialty.  See \"Patient Info\" tab in inbasket, or \"Choose Columns\" in Meds & Orders section of the refill encounter.           Passed - No active pregnancy on record    If patient is pregnant or has had a positive pregnancy test, please check TSH.         Passed - No positive pregnancy test in past 12 months    If patient is pregnant or has had a positive pregnancy test, please check TSH.          levothyroxine (SYNTHROID/LEVOTHROID) 200 MCG tablet [Pharmacy Med Name: L-THYROXINE (SYNTHROID) TABS 200MCG] 30 tablet 1     Sig: TAKE 1 TABLET DAILY (NEEDS FOLLOW UP APPOINTMENT PRIOR TO ADDITIONAL REFILLS)    Thyroid Protocol Failed    6/10/2018  4:38 PM       Failed - Normal TSH on file in past 12 months    Recent Labs   Lab Test  03/28/18   0840   TSH  18.67*             Passed - Patient is 12 years or older       Passed - Recent (12 mo) or future (30 days) visit within the authorizing provider's specialty    Patient had office visit in the last 12 months or has a visit in the next 30 days with authorizing provider or within the authorizing provider's specialty.  See \"Patient Info\" tab in inbasket, or \"Choose Columns\" in Meds & Orders section of the refill encounter.  "          Passed - No active pregnancy on record    If patient is pregnant or has had a positive pregnancy test, please check TSH.         Passed - No positive pregnancy test in past 12 months    If patient is pregnant or has had a positive pregnancy test, please check TSH.            Last Written Prescription Date:  11/29/07  Last Fill Quantity: 30,  # refills: 1   Last Office Visit with FMKRISTIAN, EDWINAP or [facility] prescribing provider:  3/28/18   Future Office Visit:

## 2018-06-11 NOTE — LETTER
[facility]  [address] [facility] [address]  [phone]    2018      RE:  Lida Blood  : 1985      To whom it may concern:    This patient may return to work on 18 for 4 hours of walking or standing and unlimited sit down work. That includes only 4 hours of walking or standing per 24 hour day.  All other work must be light duty seated work only.  She is progressing well but too much standing or walking each day will delay recovery. If this work is not yet available then I would request she remain off work until fully recovered.        Sincerely,          Delgado Martinez DPM

## 2018-06-12 RX ORDER — LEVOTHYROXINE SODIUM 200 UG/1
TABLET ORAL
Qty: 30 TABLET | Refills: 1 | Status: SHIPPED | OUTPATIENT
Start: 2018-06-12 | End: 2019-10-14

## 2018-06-12 RX ORDER — LEVOTHYROXINE SODIUM 75 UG/1
TABLET ORAL
Qty: 30 TABLET | Refills: 1 | Status: SHIPPED | OUTPATIENT
Start: 2018-06-12 | End: 2019-10-14

## 2018-06-12 NOTE — TELEPHONE ENCOUNTER
Routing refill request to provider for review/approval because:  Labs out of range:  TSH    Zoey Harper, RN  [facility]

## 2018-06-14 ENCOUNTER — HOSPITAL ENCOUNTER (OUTPATIENT)
Dept: PHYSICAL THERAPY | Facility: OTHER | Age: 33
Setting detail: THERAPIES SERIES
End: 2018-06-14
Attending: PODIATRIST
Payer: COMMERCIAL

## 2018-06-14 ENCOUNTER — TELEPHONE (OUTPATIENT)
Dept: PODIATRY | Facility: CLINIC | Age: 33
End: 2018-06-14

## 2018-06-14 PROCEDURE — 40000718 ZZHC STATISTIC PT DEPARTMENT ORTHO VISIT: Performed by: PHYSICAL THERAPIST

## 2018-06-14 PROCEDURE — 97110 THERAPEUTIC EXERCISES: CPT | Mod: GP | Performed by: PHYSICAL THERAPIST

## 2018-06-14 NOTE — PROGRESS NOTES
06/14/18 4732   Signing Clinician's Name / Credentials   Signing clinician's name / credentials mile agustin PT, jhonatan arauz SPT   Session Number   Session Number 8 BC   Session Tracking, Supervision and Quick Adds   Supervision Direct supervision provided   Adult Goals   PT Ortho Eval Goals 1;2;3   Ortho Goal 1   Goal Identifier Walking   Goal Description pt able to walk with no AD and No boot all day with no increase in pain with regular activity.     Target Date 08/02/18   Ortho Goal 2   Goal Identifier Work   Goal Description Pt able to work a 4 hour shift with no increase in ankle pain or swelling    Target Date 08/02/18   Ortho Goal 3   Goal Identifier Recreation   Goal Description Pt able to walk on level and unlevel ground  and hike x 1hour with out increased pain in hank   Target Date 08/02/18   Subjective Report   Subjective Report Patient reports she had to work an 8 hour shift and that flared up the pain.  She has increased swelling.  She is not wearing the brace because it rubs on her incision.  She is working 4 hours, 4 days a week.     Objective Measures   Objective Measures Objective Measure 1   Objective Measure 1   Objective Measure pain 3-4/10, LEFS 45/80   Therapeutic Procedure/exercise   Minutes 29   Skilled Intervention instruction in HEP, ROM   Patient Response good understanding and tolerance    Treatment Detail scar massage, PROM inversion/eversion, DF x 10, heelcord stretch hold 10 x 5 , seated ankle PF, DF, inversion, and eversion x 20 with green theraband, standing B heel raise x 20, single leg stance x 1 min (hand assist when on right foot), mini squats x 30, biodex x 15 min level 5, bosu ball 2 min   Progress decrease theraband exercises to green x 20 reps due to pain and swelling   Assessments Completed   Assessments Completed Patient has increased pain and swelling after starting work.  She tolerated ROM, bosu balance, and biodex well.  She is compliant with HEP.  Will continue to  progress as tolerated.     Education   Learner Patient   Readiness Acceptance   Method Explanation;Demonstration   Response Verbalizes Understanding;Demonstrates Understanding   Plan   Homework HEP   Home program scar massage, PROM inversion/eversion, DF x 10, heelcord stretch hold 10 x 5 , seated ankle PF, DF, inversion, and eversion x 20 with green theraband, standing B heel raise x 20, single leg stance x 1 min (hand assist when on right foot), mini squats x 30, biodex x 15 min level 5, bosu ball 2 min   Plan for next session exercise in clinic and progression of HEP    Total Session Time   Timed Code Treatment Minutes 29   Total Treatment Time (sum of timed and untimed services) 29

## 2018-06-14 NOTE — PROGRESS NOTES
06/14/18 1400   Lower Extremity Functional Scale ( 1996 VENKATESH Sanchez: used with permission)   a. Any of your usual work, housework, or school activities. 2-Moderate Difficulty   b. Your usual hobbies, recreational or sporting activities.  1-Quite a Bit of Difficulty   c. Getting into or out of the bath. 4-No Difficulty   d. Walking between rooms. 4-No Difficulty   e. Putting on your shoes or socks. 4-No Difficulty   f. Squatting. 0-Extreme Difficulty or Unable to Perform Activity   g. Lifting an object, like a bag of groceries from the floor.  4-No Difficulty   h. Performing light activities around your home.  4-No Difficulty   i. Performing heavy activities around your home. 2-Moderate Difficulty   j. Getting into or out of a car. 3-A Little Bit of Difficulty   k. Walking 2 blocks. 3-A Little Bit of Difficulty   l. Walking a mile. 1-Quite a Bit of Difficulty   m. Going up or down 10 stairs (about 1 flight of stairs). 2-Moderate Difficulty   n. Standing for 1 hour. 3-A Little Bit of Difficulty   o. Sitting for 1 hour. 4-No Difficulty   p. Running on even ground. 0-Extreme Difficulty or Unable to Perform Activity   q. Running on uneven ground. 0-Extreme Difficulty or Unable to Perform Activity   r. Making sharp turns while running fast. 0-Extreme Difficulty or Unable to Perform Activity   s. Hopping. 0-Extreme Difficulty or Unable to Perform Activity   t. Rolling over in bed. 4-No Difficulty   SCORE:    Column Totals: /80 45

## 2018-06-20 ENCOUNTER — OFFICE VISIT (OUTPATIENT)
Dept: PODIATRY | Facility: OTHER | Age: 33
End: 2018-06-20
Payer: COMMERCIAL

## 2018-06-20 VITALS
HEIGHT: 65 IN | BODY MASS INDEX: 24.99 KG/M2 | SYSTOLIC BLOOD PRESSURE: 104 MMHG | DIASTOLIC BLOOD PRESSURE: 62 MMHG | WEIGHT: 150 LBS | TEMPERATURE: 98.2 F

## 2018-06-20 DIAGNOSIS — M25.371 RIGHT ANKLE INSTABILITY: Primary | ICD-10-CM

## 2018-06-20 DIAGNOSIS — M76.71 PERONEAL TENDINITIS OF RIGHT LOWER EXTREMITY: ICD-10-CM

## 2018-06-20 PROCEDURE — 99024 POSTOP FOLLOW-UP VISIT: CPT | Performed by: PODIATRIST

## 2018-06-20 ASSESSMENT — PAIN SCALES - GENERAL: PAINLEVEL: MILD PAIN (3)

## 2018-06-20 NOTE — PROGRESS NOTES
"Chief Complaint   Patient presents with     Right Ankle - Surgical Followup     Surgical Followup     (10w5d) WB w/athletic shoes, pain 3 back to work 4 hour work days; DOS 4/6/2018 - Right ankle stabilization with Arthrex internal brace, drill fibula with peroneal tendon re-grooving, peroneal tendon repair; LOV 5/30/2018     BMI is NORMAL.    Subjective: Patient reports for followup of DOS 4/6/2018 - Right ankle stabilization with Arthrex internal brace, drill fibula with peroneal tendon re-grooving, peroneal tendon repair debridement procedure.      EXAM:  No apparent distress, patient is relaxed and comfortable.   Vitals: /62 (BP Location: Left arm, Cuff Size: Adult Regular)  Temp 98.2  F (36.8  C) (Temporal)  Ht 5' 5.2\" (1.656 m)  Wt 150 lb (68 kg)  LMP 09/01/2008  BMI 24.81 kg/m2  Vasc:  DP and PT pulses palpable bilateral, CFT immediate to all digits and surrounding the surgical site.  Neuro:  Light touch sensation intact about the digits. There is minimal to no appreciable numbness around the surgical incision with examination.  Derm: The incision is well approximated and dry.   Musc: Adequate reduction of deformity identified.  Still some edema through the course of the peroneal tendons and distal fibula but overall this is much improved.  Negative anterior drawer negative talar tilt clinically when compared to the contralateral side and no peroneal subluxation noted.  She is able to perform unilateral toe raise on right surgical side but with less authority than the contralateral side.  She is able to balance on right foot almost as well as left.    Assessment:      ICD-10-CM    1. Right ankle instability M25.371    2. Peroneal tendinitis of right lower extremity M76.71        Plan:    4/11/2018  The dressing was removed and surgical site inspected.   A bulky sterile compression dressing multilayer compression dressing was applied  She was placed back in her posterior splint  Begin oral " antibiotics secondary to about 10 mL's of dry blood in the dressing, increased pain on Saturday, this may have been from the block wearing off.  She also notes packing her entire foot and ice after putting a garbage bag on really helped discomfort and she was instructed to do so as needed  Refilled Percocet  Remain strictly nonweightbearing with foot elevated at or above heart impact the entire extremity and ice after protecting it with a garbage bag to keep the dressings dry.  Follow-up one week  Reviewed intraoperative findings and answered all questions    4/19/2018  All sutures were removed  Compression dressing was applied  Dispensed a fracture boot utilize day and night even during sleep to keep the ankle at 90.  Stay in the fracture boot can begin weightbearing in the fracture boot when no longer utilizing pain medication refilled pain medication  Follow-up in 2 weeks likely to begin physical therapy.      5/2/2018  Begin PT as tolerated  She may progress out of boot for PT as long as not using narcotic pain meds  Progress to WB in boot  Sleep in boot until 6 weeks po  PT can progress ROM and ACTIVE rom and even WB without boot as tolerated.   Expect no boot at 6 weeks po.   Refilled Percocet to utilize after PT if necessary however minimize use of this.    5/30/2018  Stop the boot for all activities  May consider ankle compression brace not long-term  Letter to return to 4 hours of standing or walking per day at work and unlimited seated work, starting in 1 week  Follow-up in 3 weeks and likely will return to all activities    6/20/2018  Continue 4 hours of walking or standing for 1-2 weeks.  Letter to return to 4 hours of walking or standing 1-2 more weeks follow-up in 2 weeks.  Continue PT as long as gains are made.    Delgado Martinez DPM

## 2018-06-20 NOTE — MR AVS SNAPSHOT
"              After Visit Summary   6/20/2018    Lida Blood    MRN: [id number]           Patient Information     Date Of Birth          1985        Visit Information        Provider Department      6/20/2018 9:00 AM Delgado Martinez DPM [facility]        Today's Diagnoses     Right ankle instability    -  1    Peroneal tendinitis of right lower extremity          Care Instructions    Follow-up in 2 weeks          Follow-ups after your visit        Who to contact     If you have questions or need follow up information about today's clinic visit or your schedule please contact [facility] directly at [phone].  Normal or non-critical lab and imaging results will be communicated to you by [employer], letter or phone within 4 business days after the clinic has received the results. If you do not hear from us within 7 days, please contact the clinic through [employer] or phone. If you have a critical or abnormal lab result, we will notify you by phone as soon as possible.  Submit refill requests through [facility] [facility] [place] or call your pharmacy and they will forward the refill request to us. Please allow 3 business days for your refill to be completed.          Additional Information About Your Visit        MyChart Information     [facility] gives you secure access to your electronic health record. If you see a primary care provider, you can also send messages to your care team and make appointments. If you have questions, please call your primary care clinic.  If you do not have a primary care provider, please call [phone] and they will assist you.        Care EveryWhere ID     This is your Care EveryWhere ID. This could be used by other organizations to access your [place] medical records  [id number]        Your Vitals Were     Temperature Height Last Period BMI (Body Mass Index)          98.2  F (36.8  C) (Temporal) 5' 5.2\" (1.656 m) 09/01/2008 24.81 kg/m2         Blood Pressure " from Last 3 Encounters:   06/20/18 104/62   05/30/18 110/72   05/02/18 98/70    Weight from Last 3 Encounters:   06/20/18 150 lb (68 kg)   05/30/18 150 lb (68 kg)   05/02/18 150 lb (68 kg)              Today, you had the following     No orders found for display       Primary Care Provider Office Phone # Fax #    Joaquin Dudley [phone]       [address] [facility] [address]        Equal Access to Services     KAYDEN MORA : Hadii aad ku hadasho Soomaali, waaxda luqadaha, qaybta kaalmada adeegyada, garcía herrerain haydavid cee . So [facility] [phone].    ATENCIÓN: Si habla español, tiene a goldsmith disposición servicios gratuitos de asistencia lingüística. Llame al [phone].    We comply with applicable federal civil rights laws and [place] laws. We do not discriminate on the basis of race, color, national origin, age, disability, sex, sexual orientation, or gender identity.            Thank you!     Thank you for choosing [facility]  for your care. Our goal is always to provide you with excellent care. Hearing back from our patients is one way we can continue to improve our services. Please take a few minutes to complete the written survey that you may receive in the mail after your visit with us. Thank you!             Your Updated Medication List - Protect others around you: Learn how to safely use, store and throw away your medicines at www.disposemymeds.org.          This list is accurate as of 6/20/18 10:41 AM.  Always use your most recent med list.                   Brand Name Dispense Instructions for use Diagnosis    ALEVE PO           CALCIUM PO      Take by mouth daily        escitalopram 20 MG tablet    LEXAPRO    90 tablet    Take 1 tablet (20 mg) by mouth daily    Major depressive disorder, single episode, moderate (H)       estradiol 0.075 MG/24HR Ptwk     12 patch    Place 1 patch onto the skin once a week    Symptomatic menopausal or female climacteric  states       hydrOXYzine 25 MG tablet    ATARAX    30 tablet    Take 1 tablet (25 mg) by mouth every 6 hours as needed for itching (and nausea)    Right ankle instability, Peroneal tendinitis of right lower extremity       IBUPROFEN PO      Take 600 mg by mouth        * levothyroxine 25 mcg/mL Susp    SYNTHROID    330 mL    Take 11 mLs (275 mcg) by mouth every morning (before breakfast)    Acquired hypothyroidism       * levothyroxine 75 MCG tablet    SYNTHROID/LEVOTHROID    30 tablet    TAKE 1 TABLET DAILY, TAKE ALONG WITH 200 MCG TABLET DAILY (NEEDS FOLLOW UP APPOINTMENT PRIOR TO ADDITIONAL REFILLS)    Acquired hypothyroidism       * levothyroxine 200 MCG tablet    SYNTHROID/LEVOTHROID    30 tablet    TAKE 1 TABLET DAILY (NEEDS FOLLOW UP APPOINTMENT PRIOR TO ADDITIONAL REFILLS)    Acquired hypothyroidism       OMEPRAZOLE PO      Take 40 mg by mouth 2 times daily (before meals)        order for DME     1 Units    One - rollabout or bent knee scooter/wheeled walker with bent knee for non weight bearing status.  Please call [phone] to schedule delivery of the Roll About.    Right ankle instability, Peroneal tendinitis of right lower extremity       * oxyCODONE-acetaminophen 5-325 MG per tablet    PERCOCET    20 tablet    Take 1-2 tablets by mouth every 4 hours as needed for pain (moderate to severe)    Right ankle instability, Peroneal tendinitis of right lower extremity       * oxyCODONE-acetaminophen 5-325 MG per tablet    PERCOCET    20 tablet    Take 1 tablet by mouth every 6 hours as needed for severe pain maximum 6 tablet(s) per day    Right ankle instability, Peroneal tendinitis of right lower extremity       senna-docusate 8.6-50 MG per tablet    SENOKOT-S;PERICOLACE    30 tablet    Take 1-2 tablets by mouth 2 times daily Take while on oral narcotics to prevent or treat constipation.    Right ankle instability, Peroneal tendinitis of right lower extremity       VITAMIN D PO      Take by mouth daily         * Notice:  This list has 5 medication(s) that are the same as other medications prescribed for you. Read the directions carefully, and ask your doctor or other care provider to review them with you.

## 2018-06-21 ENCOUNTER — TELEPHONE (OUTPATIENT)
Dept: PODIATRY | Facility: OTHER | Age: 33
End: 2018-06-21

## 2018-06-21 NOTE — TELEPHONE ENCOUNTER
Successfully faxed workability letter dated 6/20/2018 from Dr. Martinez to [facility] [employer] @ [phone]. Adeline Schmid CMA, June 21, 2018

## 2018-07-02 ENCOUNTER — OFFICE VISIT (OUTPATIENT)
Dept: PODIATRY | Facility: CLINIC | Age: 33
End: 2018-07-02
Payer: COMMERCIAL

## 2018-07-02 VITALS
HEIGHT: 65 IN | SYSTOLIC BLOOD PRESSURE: 112 MMHG | DIASTOLIC BLOOD PRESSURE: 72 MMHG | BODY MASS INDEX: 25.83 KG/M2 | WEIGHT: 155 LBS

## 2018-07-02 DIAGNOSIS — M76.71 PERONEAL TENDINITIS OF RIGHT LOWER EXTREMITY: ICD-10-CM

## 2018-07-02 DIAGNOSIS — M25.371 RIGHT ANKLE INSTABILITY: Primary | ICD-10-CM

## 2018-07-02 PROCEDURE — 99024 POSTOP FOLLOW-UP VISIT: CPT | Performed by: PODIATRIST

## 2018-07-02 ASSESSMENT — PAIN SCALES - GENERAL: PAINLEVEL: MILD PAIN (2)

## 2018-07-02 NOTE — MR AVS SNAPSHOT
"              After Visit Summary   7/2/2018    Lida Blood    MRN: [id number]           Patient Information     Date Of Birth          1985        Visit Information        Provider Department      7/2/2018 2:15 PM Delgado Martinez DPM [facility]        Today's Diagnoses     Right ankle instability    -  1    Peroneal tendinitis of right lower extremity        Subluxation of peroneal tendon of right foot, initial encounter          Care Instructions    Follow-up as needed          Follow-ups after your visit        Who to contact     If you have questions or need follow up information about today's clinic visit or your schedule please contact [facility] directly at [phone].  Normal or non-critical lab and imaging results will be communicated to you by [employer], letter or phone within 4 business days after the clinic has received the results. If you do not hear from us within 7 days, please contact the clinic through [employer] or phone. If you have a critical or abnormal lab result, we will notify you by phone as soon as possible.  Submit refill requests through [employer] or call your pharmacy and they will forward the refill request to us. Please allow 3 business days for your refill to be completed.          Additional Information About Your Visit        MyChart Information     [employer] gives you secure access to your electronic health record. If you see a primary care provider, you can also send messages to your care team and make appointments. If you have questions, please call your primary care clinic.  If you do not have a primary care provider, please call [phone] and they will assist you.        Care EveryWhere ID     This is your Care EveryWhere ID. This could be used by other organizations to access your [facility] medical records  [id number]        Your Vitals Were     Height Last Period BMI (Body Mass Index)             5' 5.2\" (1.656 m) 09/01/2008 " 25.64 kg/m2          Blood Pressure from Last 3 Encounters:   07/02/18 112/72   06/20/18 104/62   05/30/18 110/72    Weight from Last 3 Encounters:   07/02/18 155 lb (70.3 kg)   06/20/18 150 lb (68 kg)   05/30/18 150 lb (68 kg)              Today, you had the following     No orders found for display       Primary Care Provider Office Phone # Fax #    Joaquin Dudley [phone]       [address] [facility] [address]        Equal Access to Services     [facility]: Hadii aad ku hadasho Soomaali, waaxda luqadaha, qaybta kaalmada crow, garcía cee . So [facility] [phone].    ATENCIÓN: Si habla español, tiene a goldsmith disposición servicios gratuitos de asistencia lingüística. [facility] [phone].    We comply with applicable federal civil rights laws and [place] laws. We do not discriminate on the basis of race, color, national origin, age, disability, sex, sexual orientation, or gender identity.            Thank you!     Thank you for choosing [facility]  for your care. Our goal is always to provide you with excellent care. Hearing back from our patients is one way we can continue to improve our services. Please take a few minutes to complete the written survey that you may receive in the mail after your visit with us. Thank you!             Your Updated Medication List - Protect others around you: Learn how to safely use, store and throw away your medicines at www.disposemymeds.org.          This list is accurate as of 7/2/18  3:31 PM.  Always use your most recent med list.                   Brand Name Dispense Instructions for use Diagnosis    ALEVE PO           CALCIUM PO      Take by mouth daily        escitalopram 20 MG tablet    LEXAPRO    90 tablet    Take 1 tablet (20 mg) by mouth daily    Major depressive disorder, single episode, moderate (H)       estradiol 0.075 MG/24HR Ptwk     12 patch    Place 1 patch onto the skin once a week     Symptomatic menopausal or female climacteric states       hydrOXYzine 25 MG tablet    ATARAX    30 tablet    Take 1 tablet (25 mg) by mouth every 6 hours as needed for itching (and nausea)    Right ankle instability, Peroneal tendinitis of right lower extremity       IBUPROFEN PO      Take 600 mg by mouth        * levothyroxine 25 mcg/mL Susp    SYNTHROID    330 mL    Take 11 mLs (275 mcg) by mouth every morning (before breakfast)    Acquired hypothyroidism       * levothyroxine 75 MCG tablet    SYNTHROID/LEVOTHROID    30 tablet    TAKE 1 TABLET DAILY, TAKE ALONG WITH 200 MCG TABLET DAILY (NEEDS FOLLOW UP APPOINTMENT PRIOR TO ADDITIONAL REFILLS)    Acquired hypothyroidism       * levothyroxine 200 MCG tablet    SYNTHROID/LEVOTHROID    30 tablet    TAKE 1 TABLET DAILY (NEEDS FOLLOW UP APPOINTMENT PRIOR TO ADDITIONAL REFILLS)    Acquired hypothyroidism       OMEPRAZOLE PO      Take 40 mg by mouth 2 times daily (before meals)        VITAMIN D PO      Take by mouth daily        * Notice:  This list has 3 medication(s) that are the same as other medications prescribed for you. Read the directions carefully, and ask your doctor or other care provider to review them with you.

## 2018-07-02 NOTE — PROGRESS NOTES
"Chief Complaint   Patient presents with     Surgical Followup     (12w3d) WB w/athletic shoes, stiff, pain 2 back to work 4 hour work days; DOS 4/6/2018 - Right ankle stabilization with Arthrex internal brace, drill fibula with peroneal tendon re-grooving, peroneal tendon repair; LOV 6/20/2018       Weight management plan: Patient was referred to their PCP to discuss a diet and exercise plan.     Subjective: Patient reports for followup of DOS 4/6/2018 - Right ankle stabilization with Arthrex internal brace, drill fibula with peroneal tendon re-grooving, peroneal tendon repair debridement procedure.      EXAM:  No apparent distress, patient is relaxed and comfortable.   Vitals: /72 (BP Location: Left arm, Cuff Size: Adult Regular)  Ht 5' 5.2\" (1.656 m)  Wt 155 lb (70.3 kg)  LMP 09/01/2008  BMI 25.64 kg/m2  Vasc:  DP and PT pulses palpable bilateral, CFT immediate to all digits and surrounding the surgical site.  Neuro:  Light touch sensation intact about the digits. There is minimal to no appreciable numbness around the surgical incision with examination.  Derm: The incision is well approximated and dry.   Musc: Adequate reduction of deformity identified.  Still some edema through the course of the peroneal tendons and distal fibula but overall this is much improved.  Negative anterior drawer negative talar tilt clinically when compared to the contralateral side and no peroneal subluxation noted.  She is able to perform unilateral toe raise on right surgical side but with less authority than the contralateral side.  She is able to balance on right foot almost as well as left.    Assessment:      ICD-10-CM    1. Right ankle instability M25.371    2. Peroneal tendinitis of right lower extremity M76.71    3. Subluxation of peroneal tendon of right foot, initial encounter S93.04XA        Plan:    4/11/2018  The dressing was removed and surgical site inspected.   A bulky sterile compression dressing multilayer " compression dressing was applied  She was placed back in her posterior splint  Begin oral antibiotics secondary to about 10 mL's of dry blood in the dressing, increased pain on Saturday, this may have been from the block wearing off.  She also notes packing her entire foot and ice after putting a garbage bag on really helped discomfort and she was instructed to do so as needed  Refilled Percocet  Remain strictly nonweightbearing with foot elevated at or above heart impact the entire extremity and ice after protecting it with a garbage bag to keep the dressings dry.  Follow-up one week  Reviewed intraoperative findings and answered all questions    4/19/2018  All sutures were removed  Compression dressing was applied  Dispensed a fracture boot utilize day and night even during sleep to keep the ankle at 90.  Stay in the fracture boot can begin weightbearing in the fracture boot when no longer utilizing pain medication refilled pain medication  Follow-up in 2 weeks likely to begin physical therapy.      5/2/2018  Begin PT as tolerated  She may progress out of boot for PT as long as not using narcotic pain meds  Progress to WB in boot  Sleep in boot until 6 weeks po  PT can progress ROM and ACTIVE rom and even WB without boot as tolerated.   Expect no boot at 6 weeks po.   Refilled Percocet to utilize after PT if necessary however minimize use of this.    5/30/2018  Stop the boot for all activities  May consider ankle compression brace not long-term  Letter to return to 4 hours of standing or walking per day at work and unlimited seated work, starting in 1 week  Follow-up in 3 weeks and likely will return to all activities    6/20/2018  Continue 4 hours of walking or standing for 1-2 weeks.  Letter to return to 4 hours of walking or standing 1-2 more weeks follow-up in 2 weeks.  Continue PT as long as gains are made.    7/2/2018  Patient has discontinued physical therapy and has returned to standing walking 8+ hours at  work plus her activities of daily living with minimal discomfort.  Overall she notes that her ankle is much stronger and far more reliable.  She continues to gain strength as well.  She is now stronger with more balance than she had pre-surgery.  She has been utilizing the orthotics and appropriate shoe gear and would like to return to work unrestricted and has no further questions at this time.    He was given a letter to return to work unrestricted answered all questions and follow-up in 3 months with any questions or concerns or symptoms otherwise as needed.    Delgado Martinez DPM

## 2018-07-02 NOTE — LETTER
[address] [address]  [phone]    2018      RE:  Lida Alexandria Ohjuli  : 1985      To whom it may concern:    This patient may return to work unrestricted now.      Sincerely,          Delgado Martinez DPM

## 2018-07-05 NOTE — ADDENDUM NOTE
Encounter addended by: Nessa Madden, PT on: 7/5/2018 10:07 AM<BR>     Actions taken: Episode resolved, Sign clinical note

## 2018-07-05 NOTE — PROGRESS NOTES
Outpatient Physical Therapy Discharge Note     Patient: Lida Blood  : 1985    Beginning/End Dates of Reporting Period:  2018 to 2018    Referring Provider: canelo alicea DPM     Therapy Diagnosis: right ankle stabilization 2018      Client Self Report: Patient reports she had to work an 8 hour shift and that flared up the pain.  She has increased swelling.  She is not wearing the brace because it rubs on her incision.  She is working 4 hours, 4 days a week.      Objective Measurements:  Objective Measure: pain 3-4/10, LEFS 45/80      patient seen for 8 Rx sessions for exercises in clinic and progression of HEP at last Rx on 2018 she was completing the following   scar massage, PROM inversion/eversion, DF x 10, heelcord stretch hold 10 x 5 , seated ankle PF, DF, inversion, and eversion x 20 with green theraband, standing B heel raise x 20, single leg stance x 1 min (hand assist when on right foot), mini squats x 30, biodex x 15 min level 5, bosu ball 2 min    As of 2018 she has not followed back with PT        Goals:  Goal Identifier Walking   Goal Description pt able to walk with no AD and No boot all day with no increase in pain with regular activity.     Target Date 18   Date Met      Progress:     Goal Identifier Work   Goal Description Pt able to work a 4 hour shift with no increase in ankle pain or swelling    Target Date 18   Date Met      Progress:     Goal Identifier Recreation   Goal Description Pt able to walk on level and unlevel ground  and hike x 1hour with out increased pain in hank   Target Date 18   Date Met      Progress:       Progress Toward Goals:   Progress this reporting period: patient is very compliant with HEP and is continueing on HEP           Plan:  Discharge from therapy.    Discharge:    Reason for Discharge: Patient has met all goals.    Equipment Issued: theraband     Discharge Plan: Patient to continue home program.

## 2018-09-11 ENCOUNTER — APPOINTMENT (OUTPATIENT)
Dept: CT IMAGING | Facility: CLINIC | Age: 33
End: 2018-09-11
Attending: FAMILY MEDICINE
Payer: COMMERCIAL

## 2018-09-11 ENCOUNTER — HOSPITAL ENCOUNTER (EMERGENCY)
Facility: CLINIC | Age: 33
Discharge: HOME | End: 2018-09-11
Attending: FAMILY MEDICINE | Admitting: FAMILY MEDICINE
Payer: COMMERCIAL

## 2018-09-11 VITALS
BODY MASS INDEX: 25.83 KG/M2 | TEMPERATURE: 98.7 F | DIASTOLIC BLOOD PRESSURE: 97 MMHG | RESPIRATION RATE: 19 BRPM | HEART RATE: 69 BPM | HEIGHT: 65 IN | OXYGEN SATURATION: 98 % | SYSTOLIC BLOOD PRESSURE: 125 MMHG | WEIGHT: 155 LBS

## 2018-09-11 DIAGNOSIS — R10.13 EPIGASTRIC ABDOMINAL PAIN: ICD-10-CM

## 2018-09-11 DIAGNOSIS — J98.11 ATELECTASIS OF RIGHT LUNG: ICD-10-CM

## 2018-09-11 DIAGNOSIS — R10.11 ABDOMINAL PAIN, RIGHT UPPER QUADRANT: ICD-10-CM

## 2018-09-11 LAB
ALBUMIN SERPL-MCNC: 4.1 G/DL (ref 3.4–5)
ALBUMIN UR-MCNC: NEGATIVE MG/DL
ALP SERPL-CCNC: 52 U/L (ref 40–150)
ALT SERPL W P-5'-P-CCNC: 14 U/L (ref 0–50)
ANION GAP SERPL CALCULATED.3IONS-SCNC: 6 MMOL/L (ref 3–14)
APPEARANCE UR: ABNORMAL
AST SERPL W P-5'-P-CCNC: 11 U/L (ref 0–45)
BASOPHILS # BLD AUTO: 0.1 10E9/L (ref 0–0.2)
BASOPHILS NFR BLD AUTO: 1.3 %
BILIRUB SERPL-MCNC: 0.4 MG/DL (ref 0.2–1.3)
BILIRUB UR QL STRIP: NEGATIVE
BUN SERPL-MCNC: 13 MG/DL (ref 7–30)
CALCIUM SERPL-MCNC: 8.4 MG/DL (ref 8.5–10.1)
CHLORIDE SERPL-SCNC: 107 MMOL/L (ref 94–109)
CO2 SERPL-SCNC: 28 MMOL/L (ref 20–32)
COLOR UR AUTO: YELLOW
CREAT SERPL-MCNC: 0.69 MG/DL (ref 0.52–1.04)
CRP SERPL-MCNC: <2.9 MG/L (ref 0–8)
DIFFERENTIAL METHOD BLD: ABNORMAL
EOSINOPHIL NFR BLD AUTO: 2.3 %
ERYTHROCYTE [DISTWIDTH] IN BLOOD BY AUTOMATED COUNT: 12.3 % (ref 10–15)
GFR SERPL CREATININE-BSD FRML MDRD: >90 ML/MIN/1.7M2
GLUCOSE SERPL-MCNC: 82 MG/DL (ref 70–99)
GLUCOSE UR STRIP-MCNC: NEGATIVE MG/DL
HCG UR QL: NEGATIVE
HCT VFR BLD AUTO: 37.9 % (ref 35–47)
HGB BLD-MCNC: 12.8 G/DL (ref 11.7–15.7)
HGB UR QL STRIP: NEGATIVE
IMM GRANULOCYTES # BLD: 0 10E9/L (ref 0–0.4)
IMM GRANULOCYTES NFR BLD: 0.3 %
KETONES UR STRIP-MCNC: NEGATIVE MG/DL
LEUKOCYTE ESTERASE UR QL STRIP: NEGATIVE
LIPASE SERPL-CCNC: 140 U/L (ref 73–393)
LYMPHOCYTES # BLD AUTO: 1.1 10E9/L (ref 0.8–5.3)
LYMPHOCYTES NFR BLD AUTO: 27.8 %
MCH RBC QN AUTO: 34.3 PG (ref 26.5–33)
MCHC RBC AUTO-ENTMCNC: 33.8 G/DL (ref 31.5–36.5)
MCV RBC AUTO: 102 FL (ref 78–100)
MONOCYTES # BLD AUTO: 0.3 10E9/L (ref 0–1.3)
MONOCYTES NFR BLD AUTO: 7.8 %
MUCOUS THREADS #/AREA URNS LPF: PRESENT /LPF
NEUTROPHILS # BLD AUTO: 2.4 10E9/L (ref 1.6–8.3)
NEUTROPHILS NFR BLD AUTO: 60.5 %
NITRATE UR QL: NEGATIVE
NRBC # BLD AUTO: 0 10*3/UL
NRBC BLD AUTO-RTO: 0 /100
PH UR STRIP: 6 PH (ref 5–7)
PLATELET # BLD AUTO: 287 10E9/L (ref 150–450)
POTASSIUM SERPL-SCNC: 4 MMOL/L (ref 3.4–5.3)
PROT SERPL-MCNC: 7.5 G/DL (ref 6.8–8.8)
RBC # BLD AUTO: 3.73 10E12/L (ref 3.8–5.2)
RBC #/AREA URNS AUTO: 1 /HPF (ref 0–2)
SODIUM SERPL-SCNC: 141 MMOL/L (ref 133–144)
SOURCE: ABNORMAL
SP GR UR STRIP: 1.02 (ref 1–1.03)
SQUAMOUS #/AREA URNS AUTO: 7 /HPF (ref 0–1)
UROBILINOGEN UR STRIP-MCNC: 0 MG/DL (ref 0–2)
WBC # BLD AUTO: 4 10E9/L (ref 4–11)
WBC #/AREA URNS AUTO: 3 /HPF (ref 0–5)

## 2018-09-11 PROCEDURE — 81025 URINE PREGNANCY TEST: CPT | Performed by: FAMILY MEDICINE

## 2018-09-11 PROCEDURE — 86140 C-REACTIVE PROTEIN: CPT | Performed by: FAMILY MEDICINE

## 2018-09-11 PROCEDURE — 74177 CT ABD & PELVIS W/CONTRAST: CPT

## 2018-09-11 PROCEDURE — 25000128 H RX IP 250 OP 636: Performed by: RADIOLOGY

## 2018-09-11 PROCEDURE — 83690 ASSAY OF LIPASE: CPT | Performed by: FAMILY MEDICINE

## 2018-09-11 PROCEDURE — C9113 INJ PANTOPRAZOLE SODIUM, VIA: HCPCS | Performed by: FAMILY MEDICINE

## 2018-09-11 PROCEDURE — 99285 EMERGENCY DEPT VISIT HI MDM: CPT | Mod: Z6 | Performed by: FAMILY MEDICINE

## 2018-09-11 PROCEDURE — 25000128 H RX IP 250 OP 636: Performed by: FAMILY MEDICINE

## 2018-09-11 PROCEDURE — 81001 URINALYSIS AUTO W/SCOPE: CPT | Performed by: FAMILY MEDICINE

## 2018-09-11 PROCEDURE — 96361 HYDRATE IV INFUSION ADD-ON: CPT | Performed by: FAMILY MEDICINE

## 2018-09-11 PROCEDURE — 96374 THER/PROPH/DIAG INJ IV PUSH: CPT | Performed by: FAMILY MEDICINE

## 2018-09-11 PROCEDURE — 25000125 ZZHC RX 250: Performed by: RADIOLOGY

## 2018-09-11 PROCEDURE — 85025 COMPLETE CBC W/AUTO DIFF WBC: CPT | Performed by: FAMILY MEDICINE

## 2018-09-11 PROCEDURE — 80053 COMPREHEN METABOLIC PANEL: CPT | Performed by: FAMILY MEDICINE

## 2018-09-11 PROCEDURE — 96375 TX/PRO/DX INJ NEW DRUG ADDON: CPT | Performed by: FAMILY MEDICINE

## 2018-09-11 PROCEDURE — 99285 EMERGENCY DEPT VISIT HI MDM: CPT | Mod: 25 | Performed by: FAMILY MEDICINE

## 2018-09-11 RX ORDER — HYDROMORPHONE HYDROCHLORIDE 1 MG/ML
0.5 INJECTION, SOLUTION INTRAMUSCULAR; INTRAVENOUS; SUBCUTANEOUS
Status: DISCONTINUED | OUTPATIENT
Start: 2018-09-11 | End: 2018-09-11 | Stop reason: HOSPADM

## 2018-09-11 RX ORDER — SODIUM CHLORIDE 9 MG/ML
1000 INJECTION, SOLUTION INTRAVENOUS CONTINUOUS
Status: DISCONTINUED | OUTPATIENT
Start: 2018-09-11 | End: 2018-09-11

## 2018-09-11 RX ORDER — IOPAMIDOL 755 MG/ML
500 INJECTION, SOLUTION INTRAVASCULAR ONCE
Status: COMPLETED | OUTPATIENT
Start: 2018-09-11 | End: 2018-09-11

## 2018-09-11 RX ORDER — LORAZEPAM 2 MG/ML
1 INJECTION INTRAMUSCULAR ONCE
Status: COMPLETED | OUTPATIENT
Start: 2018-09-11 | End: 2018-09-11

## 2018-09-11 RX ORDER — ONDANSETRON 2 MG/ML
4 INJECTION INTRAMUSCULAR; INTRAVENOUS EVERY 30 MIN PRN
Status: DISCONTINUED | OUTPATIENT
Start: 2018-09-11 | End: 2018-09-11 | Stop reason: HOSPADM

## 2018-09-11 RX ORDER — PANTOPRAZOLE SODIUM 40 MG/1
40 TABLET, DELAYED RELEASE ORAL 2 TIMES DAILY
Qty: 30 TABLET | Refills: 0 | Status: SHIPPED | OUTPATIENT
Start: 2018-09-11

## 2018-09-11 RX ORDER — OXYCODONE HYDROCHLORIDE 5 MG/1
5 TABLET ORAL EVERY 6 HOURS PRN
Qty: 12 TABLET | Refills: 0 | Status: SHIPPED | OUTPATIENT
Start: 2018-09-11 | End: 2018-10-03

## 2018-09-11 RX ADMIN — SODIUM CHLORIDE 60 ML: 9 INJECTION, SOLUTION INTRAVENOUS at 11:19

## 2018-09-11 RX ADMIN — ONDANSETRON 4 MG: 2 INJECTION INTRAMUSCULAR; INTRAVENOUS at 10:43

## 2018-09-11 RX ADMIN — Medication 0.5 MG: at 11:07

## 2018-09-11 RX ADMIN — PANTOPRAZOLE SODIUM 40 MG: 40 INJECTION, POWDER, FOR SOLUTION INTRAVENOUS at 10:47

## 2018-09-11 RX ADMIN — LORAZEPAM 1 MG: 2 INJECTION INTRAMUSCULAR; INTRAVENOUS at 11:27

## 2018-09-11 RX ADMIN — Medication 0.5 MG: at 10:45

## 2018-09-11 RX ADMIN — SODIUM CHLORIDE 1000 ML: 9 INJECTION, SOLUTION INTRAVENOUS at 10:43

## 2018-09-11 RX ADMIN — IOPAMIDOL 76 ML: 755 INJECTION, SOLUTION INTRAVENOUS at 11:19

## 2018-09-11 NOTE — ED TRIAGE NOTES
Sudden severe abdominal pain this morning, nausea and diaphoretic.  Reports she had a syncopal episode at home PTA, denies hitting her head.  Hx of bypass and ulcers.

## 2018-09-11 NOTE — LETTER
2018      [facility]  Emergency Room  [address]   [address]  Tel: [phone]   Fax: [phone]  2018    Lida Blood  [address] [facility] [address]  [phone] (home)     : 1985          To Whom it May Concern:    Lida Blood was seen in our ER today 2018. I expect her condition to improve over the next 2-3 days.  She may return to work, without restriction, when improved. If not improved during the above expected time period,  then I have encouraged her to be rechecked in her clinic for further evaluation.      Please contact me for questions or concerns.    Sincerely,      Glynn Rodriguez  Physician  [facility] Emergency Room

## 2018-09-11 NOTE — DISCHARGE INSTRUCTIONS
Please read and follow the handout(s) instructions. Return, if needed, for increased or worsening symptoms and as directed by the handout(s).    I sent your new script(s) to the [facility] pharmacy.    Stop the ibuprofen, naproxen as this is a likely reason for ulcers especially when taking the Lexapro.    Follow-up with your Bariatric surgeon or Dr. Dudley as we discussed within two weeks to evaluate the change in medications.    Use your incentive spirometer multiple times a day to resolve the atelectasis of the right lower lung.    Electronically signed, Glynn Rodriguez DO

## 2018-09-11 NOTE — ED AVS SNAPSHOT
[facility] Emergency Department    [address] [place] [address]    Phone:  [phone]    Fax:  [phone]                                       Lida Blood   MRN: [id number]    Department:  [facility] Emergency Department   Date of Visit:  9/11/2018           After Visit Summary Signature Page     I have received my discharge instructions, and my questions have been answered. I have discussed any challenges I see with this plan with the nurse or doctor.    ..........................................................................................................................................  Patient/Patient Representative Signature      ..........................................................................................................................................  Patient Representative Print Name and Relationship to Patient    ..................................................               ................................................  Date                                            Time    ..........................................................................................................................................  Reviewed by Signature/Title    ...................................................              ..............................................  Date                                                            Time          22EPIC Rev 08/18

## 2018-09-11 NOTE — ED AVS SNAPSHOT
[facility] Emergency Department    [address] [place] [address]    Phone:  [phone]    Fax:  [phone]                                       Lida Blood   MRN: [id number]    Department:  [facility] Emergency Department   Date of Visit:  9/11/2018           Patient Information     Date Of Birth          1985        Your diagnoses for this visit were:     Epigastric abdominal pain suspect peptic ulcer    Atelectasis of right lung     Abdominal pain, right upper quadrant suspect secondary to the right lower lobe atelectasis       You were seen by Glynn Rodriguez DO.      Follow-up Information     Call Your Bariatric surgeon.    Why:  as soon as possible        Follow up with Joaquin Dudley MD.    Specialty:  Family Practice    Why:  As needed for recheck in the next 2 weeks.    Contact information:    [address] [facility] [address]  [phone]          Follow up with [facility] Emergency Department.    Specialty:  EMERGENCY MEDICINE    Why:  If symptoms worsen    Contact information:    [address] [place] [address] [phone]    Additional information:    From [address]: Exit at [address] on [place] side of [place]. Turn right on [address]. Turn left at stoplight on [address]. [facility] will be in view two blocks ahead        Discharge Instructions       Please read and follow the handout(s) instructions. Return, if needed, for increased or worsening symptoms and as directed by the handout(s).    I sent your new script(s) to the [facility] pharmacy.    Stop the ibuprofen, naproxen as this is a likely reason for ulcers especially when taking the Lexapro.    Follow-up with your Bariatric surgeon or Dr. Dudley as we discussed within two weeks to evaluate the change in medications.    Use your incentive spirometer multiple times a day to resolve the atelectasis of the right lower  lung.    Electronically signed, Glynn Rodriguez DO          Discharge References/Attachments     ULCER, PEPTIC (ENGLISH)    ATELECTASIS (ENGLISH)      24 Hour Appointment Hotline       To make an appointment at any [facility] clinic, call [phone] ([phone]). If you don't have a family doctor or clinic, we will help you find one. [facility] clinics are conveniently located to serve the needs of you and your family.             Review of your medicines      START taking        Dose / Directions Last dose taken    oxyCODONE IR 5 MG tablet   Commonly known as:  ROXICODONE   Dose:  5 mg   Quantity:  12 tablet        Take 1 tablet (5 mg) by mouth every 6 hours as needed for pain   Refills:  0        pantoprazole 40 MG EC tablet   Commonly known as:  PROTONIX   Dose:  40 mg   Quantity:  30 tablet        Take 1 tablet (40 mg) by mouth 2 times daily   Refills:  0        ranitidine 300 MG tablet   Commonly known as:  ZANTAC   Dose:  300 mg   Quantity:  15 tablet        Take 1 tablet (300 mg) by mouth At Bedtime   Refills:  0          Our records show that you are taking the medicines listed below. If these are incorrect, please call your family doctor or clinic.        Dose / Directions Last dose taken    CALCIUM PO        Take by mouth daily   Refills:  0        escitalopram 20 MG tablet   Commonly known as:  LEXAPRO   Dose:  20 mg   Quantity:  90 tablet        Take 1 tablet (20 mg) by mouth daily   Refills:  1        estradiol 0.075 MG/24HR Ptwk   Dose:  1 patch   Quantity:  12 patch        Place 1 patch onto the skin once a week   Refills:  3        hydrOXYzine 25 MG tablet   Commonly known as:  ATARAX   Dose:  25 mg   Quantity:  30 tablet        Take 1 tablet (25 mg) by mouth every 6 hours as needed for itching (and nausea)   Refills:  3        * levothyroxine 25 mcg/mL Susp   Commonly known as:  SYNTHROID   Dose:  275 mcg   Quantity:  330 mL        Take 11 mLs (275 mcg) by mouth every morning (before  breakfast)   Refills:  1        * levothyroxine 75 MCG tablet   Commonly known as:  SYNTHROID/LEVOTHROID   Quantity:  30 tablet        TAKE 1 TABLET DAILY, TAKE ALONG WITH 200 MCG TABLET DAILY (NEEDS FOLLOW UP APPOINTMENT PRIOR TO ADDITIONAL REFILLS)   Refills:  1        * levothyroxine 200 MCG tablet   Commonly known as:  SYNTHROID/LEVOTHROID   Quantity:  30 tablet        TAKE 1 TABLET DAILY (NEEDS FOLLOW UP APPOINTMENT PRIOR TO ADDITIONAL REFILLS)   Refills:  1        VITAMIN D PO        Take by mouth daily   Refills:  0        * Notice:  This list has 3 medication(s) that are the same as other medications prescribed for you. Read the directions carefully, and ask your doctor or other care provider to review them with you.      STOP taking        Dose Reason for stopping Comments    ALEVE PO              IBUPROFEN PO              OMEPRAZOLE PO                      Information about OPIOIDS     PRESCRIPTION OPIOIDS: WHAT YOU NEED TO KNOW   We gave you an opioid (narcotic) pain medicine. It is important to manage your pain, but opioids are not always the best choice. You should first try all the other options your care team gave you. Take this medicine for as short a time (and as few doses) as possible.    Some activities can increase your pain, such as bandage changes or therapy sessions. It may help to take your pain medicine 30 to 60 minutes before these activities. Reduce your stress by getting enough sleep, working on hobbies you enjoy and practicing relaxation or meditation. Talk to your care team about ways to manage your pain beyond prescription opioids.    These medicines have risks:    DO NOT drive when on new or higher doses of pain medicine. These medicines can affect your alertness and reaction times, and you could be arrested for driving under the influence (DUI). If you need to use opioids long-term, talk to your care team about driving.    DO NOT operate heavy machinery    DO NOT do any other  dangerous activities while taking these medicines.    DO NOT drink any alcohol while taking these medicines.     If the opioid prescribed includes acetaminophen, DO NOT take with any other medicines that contain acetaminophen. Read all labels carefully. Look for the word  acetaminophen  or  Tylenol.  Ask your pharmacist if you have questions or are unsure.    You can get addicted to pain medicines, especially if you have a history of addiction (chemical, alcohol or substance dependence). Talk to your care team about ways to reduce this risk.    All opioids tend to cause constipation. Drink plenty of water and eat foods that have a lot of fiber, such as fruits, vegetables, prune juice, apple juice and high-fiber cereal. Take a laxative (Miralax, milk of magnesia, Colace, Senna) if you don t move your bowels at least every other day. Other side effects include upset stomach, sleepiness, dizziness, throwing up, tolerance (needing more of the medicine to have the same effect), physical dependence and slowed breathing.    Store your pills in a secure place, locked if possible. We will not replace any lost or stolen medicine. If you don t finish your medicine, please throw away (dispose) as directed by your pharmacist. The [place] [employer] has more information about safe disposal: https://www.pca.[email or web address].[place].us/living-green/managing-unwanted-medications        Prescriptions were sent or printed at these locations (3 Prescriptions)                   [facility] [place], [place] - [facility]    [address], [facility] [address]    Telephone:  [phone]   Fax:  [phone]   Hours:                  E-Prescribed (2 of 3)         pantoprazole (PROTONIX) 40 MG EC tablet               ranitidine (ZANTAC) 300 MG tablet                 Printed at Department/Unit printer (1 of 3)         oxyCODONE IR (ROXICODONE) 5 MG tablet                Procedures and tests performed during your visit      CBC with platelets differential    CRP inflammation    CT Abdomen Pelvis w Contrast    Comprehensive metabolic panel    Give 20 ounces of water 15 minutes before CT of abdomen    HCG qualitative urine (UPT)    Lipase    Peripheral IV catheter    UA reflex to Microscopic and Culture      Orders Needing Specimen Collection     Ordered          09/11/18 1031  Occult blood stool - ROUTINE, Prio: Routine, Needs to be Collected     Scheduled Task Status   09/11/18 1032 Collect Occult blood stool Open   Order Class:  PCU Collect                  Pending Results     Date and Time Order Name Status Description    9/11/2018 1032 CT Abdomen Pelvis w Contrast Preliminary             Pending Culture Results     No orders found from 9/9/2018 to 9/12/2018.            Pending Results Instructions     If you had any lab results that were not finalized at the time of your Discharge, you can call the ED Lab Result RN at [phone]. You will be contacted by this team for any positive Lab results or changes in treatment. The nurses are available 7 days a week from 10A to 6:30P.  You can leave a message 24 hours per day and they will return your call.        Thank you for choosing [facility]       Thank you for choosing [facility] for your care. Our goal is always to provide you with excellent care. Hearing back from our patients is one way we can continue to improve our services. Please take a few minutes to complete the written survey that you may receive in the mail after you visit with us. Thank you!        [facility] Information     [facility] gives you secure access to your electronic health record. If you see a primary care provider, you can also send messages to your care team and make appointments. If you have questions, please call your primary care clinic.  If you do not have a primary care provider, please call [phone] and they will assist you.        Care EveryWhere ID     This is your Care EveryWhere ID. This could be used  by other organizations to access your [facility] medical records  [id number]        Equal Access to Services     KAYDEN MORA : Dayana Vega, aspen gambino, garcía colon. So [facility] [phone].    ATENCIÓN: Si habla español, tiene a goldsmith disposición servicios gratuitos de asistencia lingüística. Llame al [phone].    We comply with applicable federal civil rights laws and [place] laws. We do not discriminate on the basis of race, color, national origin, age, disability, sex, sexual orientation, or gender identity.            After Visit Summary       This is your record. Keep this with you and show to your community pharmacist(s) and doctor(s) at your next visit.

## 2018-09-11 NOTE — ED PROVIDER NOTES
History     Chief Complaint   Patient presents with     Abdominal Pain     HPI  Lida Blood is a 32 year old female who presented to the emergency room secondary to concerns about severe abdominal pain that started this morning about 4:00.  She states initially felt somewhat nauseated and then had sudden onset of right upper quadrant abdominal pain and right mid upper abdominal pain.  She states that she has had multiple prior abdominal surgeries with complications of the surgeries to include a Arely-en-Y as well as cholecystectomy and appendectomy in the past.  She states that she has had upper intestinal ulcers after her Arely-en-Y procedure and reports that the pain symptoms to the mid abdomen feels somewhat like this today.  She states that she has never had the right upper quadrant abdominal pain in the past.  She states that the right upper quadrant abdominal pain is most severe rating it at a 9 out of 10 currently.  She states that the pain became so severe that earlier this morning she had a syncopal episode in her bathroom.  She states that she did not harm her self with that episode and resulted on her own as her  was already at work.  She is unable to vomit because of the Arely-en-Y procedure but feels nauseated.  She also states that someone tender to take a deep breath because of the right upper quadrant pain.  She states her last surgery was about 6 months ago and that was to her right ankle.  She denied any specific fall or injury as a reason for abdominal pain symptoms.  She denies any significant shortness of breath with the pain with breathing.  He denies any fever or chill symptoms.  She denies any changes to her stool has not noted any specific color change.  When asked if she has had any bloody or black colored stools she states that his stools sometimes has a little dark in color but she has not noticed any bloody stools.  He denies any pain or burning with urination.    Problem  List:    Patient Active Problem List    Diagnosis Date Noted     Abdominal pain 05/24/2013     Priority: High     Vomiting and diarrhea 05/23/2013     Priority: High     Abdominal pain, acute, epigastric 04/23/2012     Priority: High     S/P gastric bypass 08/10/2017     Priority: Medium     LUQ abdominal pain 08/16/2016     Priority: Medium     Ileus (H) 08/16/2016     Priority: Medium     Dehydration 03/31/2016     Priority: Medium     Obesity 03/15/2016     Priority: Medium     Gastric ulcer 12/02/2015     Priority: Medium     Hypokalemia 12/01/2015     Priority: Medium     Hypoglycemia 12/01/2015     Priority: Medium     Gastroesophageal reflux disease without esophagitis 10/16/2015     Priority: Medium     RLS (restless legs syndrome) 04/05/2013     Priority: Medium     Migraine headache 10/14/2011     Priority: Medium     Physiological ovarian cysts 04/28/2011     Priority: Medium     CARDIOVASCULAR SCREENING; LDL GOAL LESS THAN 160 10/31/2010     Priority: Medium     Vaginal discharge 07/15/2009     Priority: Medium     Surgery follow-up examination 07/15/2009     Priority: Medium     Endometriosis 10/24/2008     Priority: Medium     Major depressive disorder, single episode, moderate (H)      Priority: Medium     Backache 01/22/2004     Priority: Medium     Problem list name updated by automated process. Provider to review       Hypothyroidism 09/26/2003     Priority: Medium     Problem list name updated by automated process. Provider to review       Anxiety state 05/08/2002     Priority: Medium     Problem list name updated by automated process. 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)      Generalized anxiety disorder      Iodine hypothyroidism 9/2003     Major depressive disorder, single episode, moderate (H)      Migraine headache  10/14/2011     Obesity (BMI 30-39.9)      Other and unspecified ovarian cyst 03/02/10     Pain pelvic 12/14/04     PONV (postoperative nausea and vomiting)      S/P hysterectomy      Thyrotoxicosis without mention of goiter or other cause, without mention of thyrotoxic crisis or storm 2001     trachelectomy 05/19/09       Past Surgical History:    Past Surgical History:   Procedure Laterality Date     C APPENDECTOMY  4/18/2003     C REMOVAL OF CERVIX  05/29/09    laparoscopic assisted vaginal trachelectomy     C SUPRACERV ABD HYSTERECTOMY  10/06/08     COLONOSCOPY  11/25/14     ESOPHAGOSCOPY, GASTROSCOPY, DUODENOSCOPY (EGD), COMBINED N/A 8/31/2015    Procedure: COMBINED ESOPHAGOSCOPY, GASTROSCOPY, DUODENOSCOPY (EGD), BIOPSY SINGLE OR MULTIPLE;  Surgeon: Toni Wiggins MD;  Location:  GI     ESOPHAGOSCOPY, GASTROSCOPY, DUODENOSCOPY (EGD), COMBINED N/A 8/18/2016    Procedure: COMBINED ESOPHAGOSCOPY, GASTROSCOPY, DUODENOSCOPY (EGD);  Surgeon: Jaswinder Waddell MD;  Location:  GI     ESOPHAGOSCOPY, GASTROSCOPY, DUODENOSCOPY (EGD), COMBINED N/A 3/1/2017    Procedure: COMBINED ESOPHAGOSCOPY, GASTROSCOPY, DUODENOSCOPY (EGD), BIOPSY SINGLE OR MULTIPLE;  Surgeon: Jan Johnston MD;  Location:  GI     ESOPHAGOSCOPY, GASTROSCOPY, DUODENOSCOPY (EGD), DILATATION, COMBINED N/A 4/13/2016    Procedure: COMBINED ESOPHAGOSCOPY, GASTROSCOPY, DUODENOSCOPY (EGD), DILATATION;  Surgeon: Jos Dinero MD;  Location:  GI     EYE SURGERY      bilat orbital decompression surgery      HC COLONOSCOPY W BIOPSY  1/22/2007     HC COLONOSCOPY W/WO BRUSH/WASH  12/16/04     HC CREATE EARDRUM OPENING,GEN ANESTH  12/29/88    Bilateral myringotomy with insertion of PE tubes     HC CREATE EARDRUM OPENING,GEN ANESTH  12/20/90    Bilateral myringotomy with insertion of PE tubes     HC LAP,FULGURATE/EXCISE LESIONS  06/16/08    Laparoscopy, ablation of the endometriosis and diagnostic cystoscopy     HC LAPAROSCOPY, SURGICAL,  ABDOMEN, PERITONEUM & OMENTUM; DX W/ OR W/O SPECIMEN(S)  04/18/2003    Diagnostic laparoscopy     HC REMOVAL GALLBLADDER  8/1/03     HC TYMPANOPLASTY W/O MASTOID, INIT/REV W/O OSS CHAIN RECONST  10/03/00    Left exploratory tympanoplasty, myringoplasty, fascia grafting of the oval window, microdissection via the use of operative microscope     HC UGI ENDOSCOPY, SIMPLE EXAM  3/23/12, 11/13/14     HERNIA REPAIR, INCISIONAL  08/23/2006    Laparoscopic mesh repair.     HYSTERECTOMY, PAP NO LONGER INDICATED  5/29/09     HYSTEROSCOPY  02/08/08     LAPAROSCOPIC GASTRECTOMY N/A 3/15/2016    Procedure: LAPAROSCOPIC GASTRECTOMY;  Surgeon: Jos Dinero MD;  Location: [facility]     LAPAROSCOPIC LYSIS ADHESIONS  11/02/10    [facility]     LAPAROSCOPIC REVISION GASTROPLASTY TO ELBERT-EN-Y N/A 10/26/2015    Procedure: LAPAROSCOPIC REVISION GASTROPLASTY TO ELBERT-EN-Y;  Surgeon: Toni Wiggins MD;  Location: [facility] OR     LAPAROSCOPIC SALPINGO-OOPHORECTOMY Left 3/2015     LAPAROSCOPY DIAGNOSTIC (GENERAL) Right 06/23/11    Evaluation of abdomen/pelvis, RSO, Cysto; [place]     REPAIR TENDON PERONEAL Right 4/6/2018    Procedure: REPAIR TENDON PERONEAL;;  Surgeon: Delgado Martinez DPM;  Location: [facility] OR     STABILIZE TENDON PERONEAL Right 4/6/2018    Procedure: STABILIZE TENDON PERONEAL;  Right ankle stabilization with Arthrex internal brace, drill fibula with peroneal tendon re-grooving, peroneal tendon repair;  Surgeon: Delgado Martinez DPM;  Location: [facility] OR       Family History:    Family History   Problem Relation Age of Onset     Breast Cancer Mother      age 42 on chemo and radiation     Gynecology Mother      endometriosis     Diabetes Maternal Grandmother      type 1     Thyroid Disease Maternal Grandmother      C.A.D. Maternal Grandmother      Cardiovascular Maternal Grandmother      HEART DISEASE Maternal Grandmother      HEART DISEASE Paternal Grandmother      Diabetes Maternal Grandfather       "diabetes     Thyroid Disease Maternal Aunt      HEART DISEASE Maternal Aunt      great MGA     Gynecology Sister      endometriosis     Anesthesia Reaction No family hx of        Social History:  Marital Status:   [2]  Social History   Substance Use Topics     Smoking status: Former Smoker     Packs/day: 0.25     Years: 6.00     Types: Cigarettes     Quit date: 6/16/2015     Smokeless tobacco: Never Used      Comment: social     Alcohol use Yes      Comment: rare        Medications:      pantoprazole (PROTONIX) 40 MG EC tablet   ranitidine (ZANTAC) 300 MG tablet   CALCIUM PO   Cholecalciferol (VITAMIN D PO)   escitalopram (LEXAPRO) 20 MG tablet   estradiol 0.075 MG/24HR PTWK   hydrOXYzine (ATARAX) 25 MG tablet   levothyroxine (SYNTHROID) 25 mcg/mL SUSP   levothyroxine (SYNTHROID/LEVOTHROID) 200 MCG tablet   levothyroxine (SYNTHROID/LEVOTHROID) 75 MCG tablet         Review of Systems   Constitutional: Negative for chills and fever.   HENT: Negative.    Eyes: Negative.    Respiratory: Positive for chest tightness (Right lower). Negative for cough, shortness of breath and wheezing.    Cardiovascular: Negative for palpitations and leg swelling.   Gastrointestinal: Positive for abdominal pain and nausea. Negative for abdominal distention, blood in stool, constipation, diarrhea and rectal pain.   Genitourinary: Negative.    Musculoskeletal: Negative.    Skin: Negative for color change and rash.   Neurological: Negative.    Hematological: Negative.    Psychiatric/Behavioral: The patient is nervous/anxious.    All other systems reviewed and are negative.      Physical Exam   BP: 133/88  Pulse: 77  Temp: 98.7  F (37.1  C)  Resp: 19  Height: 165.1 cm (5' 5\")  Weight: 70.3 kg (155 lb)  SpO2: 99 %      Physical Exam   Constitutional: She is oriented to person, place, and time. She appears well-developed and well-nourished. She appears distressed.   HENT:   Head: Normocephalic and atraumatic.   Mouth/Throat: Oropharynx is " clear and moist.   Eyes: Conjunctivae and EOM are normal. Pupils are equal, round, and reactive to light.   Neck: Normal range of motion. Neck supple.   Cardiovascular: Normal rate, normal heart sounds and intact distal pulses.    No murmur heard.  Pulmonary/Chest: Effort normal and breath sounds normal. No respiratory distress. She has no wheezes. She has no rales. She exhibits tenderness (right anterior lower rib cage area.).   Abdominal: She exhibits no distension and no mass. There is tenderness (epigastric). There is guarding (Mild). There is no rebound.   Musculoskeletal: Normal range of motion.   Neurological: She is alert and oriented to person, place, and time.   Skin: Skin is warm. No rash noted. She is not diaphoretic. No erythema. No pallor.   Psychiatric: Her mood appears anxious.   Nursing note and vitals reviewed.      ED Course     ED Course     Procedures               Critical Care time:  none               Results for orders placed or performed during the hospital encounter of 09/11/18 (from the past 24 hour(s))   CBC with platelets differential   Result Value Ref Range    WBC 4.0 4.0 - 11.0 10e9/L    RBC Count 3.73 (L) 3.8 - 5.2 10e12/L    Hemoglobin 12.8 11.7 - 15.7 g/dL    Hematocrit 37.9 35.0 - 47.0 %     (H) 78 - 100 fl    MCH 34.3 (H) 26.5 - 33.0 pg    MCHC 33.8 31.5 - 36.5 g/dL    RDW 12.3 10.0 - 15.0 %    Platelet Count 287 150 - 450 10e9/L    Diff Method Automated Method     % Neutrophils 60.5 %    % Lymphocytes 27.8 %    % Monocytes 7.8 %    % Eosinophils 2.3 %    % Basophils 1.3 %    % Immature Granulocytes 0.3 %    Nucleated RBCs 0 0 /100    Absolute Neutrophil 2.4 1.6 - 8.3 10e9/L    Absolute Lymphocytes 1.1 0.8 - 5.3 10e9/L    Absolute Monocytes 0.3 0.0 - 1.3 10e9/L    Absolute Basophils 0.1 0.0 - 0.2 10e9/L    Abs Immature Granulocytes 0.0 0 - 0.4 10e9/L    Absolute Nucleated RBC 0.0    Comprehensive metabolic panel   Result Value Ref Range    Sodium 141 133 - 144 mmol/L     Potassium 4.0 3.4 - 5.3 mmol/L    Chloride 107 94 - 109 mmol/L    Carbon Dioxide 28 20 - 32 mmol/L    Anion Gap 6 3 - 14 mmol/L    Glucose 82 70 - 99 mg/dL    Urea Nitrogen 13 7 - 30 mg/dL    Creatinine 0.69 0.52 - 1.04 mg/dL    GFR Estimate >90 >60 mL/min/1.7m2    GFR Estimate If Black >90 >60 mL/min/1.7m2    Calcium 8.4 (L) 8.5 - 10.1 mg/dL    Bilirubin Total 0.4 0.2 - 1.3 mg/dL    Albumin 4.1 3.4 - 5.0 g/dL    Protein Total 7.5 6.8 - 8.8 g/dL    Alkaline Phosphatase 52 40 - 150 U/L    ALT 14 0 - 50 U/L    AST 11 0 - 45 U/L   Lipase   Result Value Ref Range    Lipase 140 73 - 393 U/L   CRP inflammation   Result Value Ref Range    CRP Inflammation <2.9 0.0 - 8.0 mg/L   CT Abdomen Pelvis w Contrast    Narrative    CT ABDOMEN AND PELVIS WITH CONTRAST   9/11/2018 11:27 AM     HISTORY: Severe right upper and upper mid abdominal pain. History of  multiple prior abdominal surgeries.     TECHNIQUE: 76 mL, Isovue 370. Radiation dose for this scan was reduced  using automated exposure control, adjustment of the mA and/or kV  according to patient size, or iterative reconstruction technique.    COMPARISON: None.    FINDINGS: Very small patchy opacity posterior right lung base likely  atelectasis.    The liver is normal. Previous cholecystectomy. Spleen and pancreas are  normal. Surgical staple lines involving the stomach from gastric  bypass surgery.    No adrenal lesions. No kidney stones or hydronephrosis. No suspicious  renal lesions. No retroperitoneal adenopathy or evidence of aortic  aneurysm.    Scans through the pelvis demonstrate a normal appearing bladder. No  pelvic adenopathy.    No evidence of diverticulitis. Surgical clips near the tip of the  cecum likely from previous appendectomy. There are multiple metallic  opacities in the anterior abdominal wall likely from hernia repair.  There is no free air or free fluid. No dilated bowel or thickened  bowel wall.      Impression    IMPRESSION:  1. Small patchy opacity  posterior right lung base likely atelectasis.  2. Postoperative changes elsewhere as described above. No evidence of  bowel obstruction.   UA reflex to Microscopic and Culture   Result Value Ref Range    Color Urine Yellow     Appearance Urine Slightly Cloudy     Glucose Urine Negative NEG^Negative mg/dL    Bilirubin Urine Negative NEG^Negative    Ketones Urine Negative NEG^Negative mg/dL    Specific Gravity Urine 1.024 1.003 - 1.035    Blood Urine Negative NEG^Negative    pH Urine 6.0 5.0 - 7.0 pH    Protein Albumin Urine Negative NEG^Negative mg/dL    Urobilinogen mg/dL 0.0 0.0 - 2.0 mg/dL    Nitrite Urine Negative NEG^Negative    Leukocyte Esterase Urine Negative NEG^Negative    Source Unspecified Urine     RBC Urine 1 0 - 2 /HPF    WBC Urine 3 0 - 5 /HPF    Squamous Epithelial /HPF Urine 7 (H) 0 - 1 /HPF    Mucous Urine Present (A) NEG^Negative /LPF   HCG qualitative urine (UPT)   Result Value Ref Range    HCG Qual Urine Negative NEG^Negative       Medications   0.9% sodium chloride BOLUS (1,000 mLs Intravenous New Bag 9/11/18 1043)     Followed by   sodium chloride 0.9% infusion (not administered)   ondansetron (ZOFRAN) injection 4 mg (4 mg Intravenous Given 9/11/18 1043)   HYDROmorphone (PF) (DILAUDID) injection 0.5 mg (0.5 mg Intravenous Given 9/11/18 1107)   pantoprazole (PROTONIX) 40 mg IV push injection (40 mg Intravenous Given 9/11/18 1047)   sodium chloride (PF) 0.9% PF flush 3 mL (3 mLs Intracatheter Given 9/11/18 1118)   iopamidol (ISOVUE-370) solution 500 mL (76 mLs Intravenous Given 9/11/18 1119)   new 100 ml saline bag (60 mLs Intravenous Given 9/11/18 1119)   LORazepam (ATIVAN) injection 1 mg (1 mg Intravenous Given 9/11/18 1127)       Assessments & Plan (with Medical Decision Making)  32-year-old female to the emergency room secondary to the onset of abdominal pain as noted above.  Patient also noted some pain to the right upper abdomen and right lower rib cage area.  Patient with exam findings  most consistent with a likely peptic ulcer with associated right lower lung atelectasis.  Patient was not tachycardic nor did she have any symptoms of shortness of breath so PE workup was not pursued at this time.  Patient was given IV Protonix while in the emergency room.  Patient had some associated anxiety associated with her symptoms and had significant improvement in her symptoms with the addition of some IV Ativan.  She was changed from her current omeprazole to Protonix and we added Zantac as well.  She is also been restart her current Carafate medicine that she does have available at home.  She is going to monitor for any signs suggestive of gastric bleeding and return to the ER if noted.  She plans follow-up for recheck in her clinic.     I have reviewed the nursing notes.    I have reviewed the findings, diagnosis, plan and need for follow up with the patient.       New Prescriptions    PANTOPRAZOLE (PROTONIX) 40 MG EC TABLET    Take 1 tablet (40 mg) by mouth 2 times daily    RANITIDINE (ZANTAC) 300 MG TABLET    Take 1 tablet (300 mg) by mouth At Bedtime          I verbally discussed the findings of the evaluation today in the ER. I have verbally discussed with Lida the suggested treatment(s) as described in the discharge instructions and handouts. I have prescribed the above listed medications and instructed her on appropriate use of these medications.      I have verbally suggested she follow-up in her clinic or return to the ER for increased symptoms. See the follow-up recommendations documented  in the after visit summary in this visit's EPIC chart.      Final diagnoses:   Epigastric abdominal pain - suspect peptic ulcer   Atelectasis of right lung   Abdominal pain, right upper quadrant - suspect secondary to the right lower lobe atelectasis       9/11/2018   [facility] EMERGENCY DEPARTMENT     Glynn Rodriguez,   09/12/18 [id number]

## 2018-09-12 ASSESSMENT — ENCOUNTER SYMPTOMS
CHILLS: 0
NERVOUS/ANXIOUS: 1
COLOR CHANGE: 0
COUGH: 0
HEMATOLOGIC/LYMPHATIC NEGATIVE: 1
WHEEZING: 0
NAUSEA: 1
CHEST TIGHTNESS: 1
DIARRHEA: 0
SHORTNESS OF BREATH: 0
EYES NEGATIVE: 1
MUSCULOSKELETAL NEGATIVE: 1
NEUROLOGICAL NEGATIVE: 1
ABDOMINAL DISTENTION: 0
PALPITATIONS: 0
CONSTIPATION: 0
BLOOD IN STOOL: 0
RECTAL PAIN: 0
ABDOMINAL PAIN: 1
FEVER: 0

## 2018-10-03 ENCOUNTER — OFFICE VISIT (OUTPATIENT)
Dept: FAMILY MEDICINE | Facility: OTHER | Age: 33
End: 2018-10-03
Payer: COMMERCIAL

## 2018-10-03 VITALS
OXYGEN SATURATION: 100 % | HEART RATE: 78 BPM | SYSTOLIC BLOOD PRESSURE: 100 MMHG | RESPIRATION RATE: 16 BRPM | BODY MASS INDEX: 25.13 KG/M2 | WEIGHT: 151 LBS | DIASTOLIC BLOOD PRESSURE: 66 MMHG | TEMPERATURE: 95.4 F

## 2018-10-03 DIAGNOSIS — N75.0 BARTHOLIN'S GLAND CYST: Primary | ICD-10-CM

## 2018-10-03 PROCEDURE — 10060 I&D ABSCESS SIMPLE/SINGLE: CPT | Performed by: NURSE PRACTITIONER

## 2018-10-03 ASSESSMENT — PAIN SCALES - GENERAL: PAINLEVEL: NO PAIN (0)

## 2018-10-03 NOTE — MR AVS SNAPSHOT
After Visit Summary   10/3/2018    Lida Blood    MRN: [id number]           Patient Information     Date Of Birth          1985        Visit Information        Provider Department      10/3/2018 8:00 AM Judy Cota APRN [facility]        Care Instructions      If this comes back and is painful, let us know.    If it is not painful, you don't have to do anything about it.   You are due for a mammogram.     Bartholin s Cyst (No Infection)     The Bartholin s glands are very small glands found inside the vaginal lips (labia).     The Bartholin s glands are very small glands found inside the vaginal lips (labia). The glands make fluid to help keep the vagina moist. When the opening of a Bartholin s gland becomes blocked, the gland may swell and form a cyst. The cyst may vary in size from small to large (1 to 3 cm). It may feel like a firm lump within the labia.  Treatment depends on the size of the cyst, whether it causes symptoms, and whether it s infected. Small or uninfected cysts generally don t require treatment. Large cysts and those that are painful or infected will likely need to be treated. In these cases, the cyst may need to be opened with an incision and then drained. If you are over 40 years of age, your healthcare provide may recommend that the cyst be biopsied.  Home care  Your cyst does not need any treatment at this time. If you have some mild discomfort, you may be advised to take sitz baths. For this, you soak in a bath with a few inches of warm water, a few times a day, or as directed. This may help the cyst to rupture and drain in a few days. No restrictions on activities, such as sex, are needed.  Follow-up care  Follow up with your healthcare provider, or as advised.  When to seek medical advice  Call your healthcare provider right away if any of these occur:    Fever of 100.4 F (38 C) or higher, or as directed by your provider    Increased  redness, pain, swelling, or foul-smelling drainage from the cyst or the area around it    Cyst grows larger or causes symptoms that bother you  Date Last Reviewed: 11/1/2017 2000-2017 [employer]. [address] [place] [address], [place] [address]. All rights reserved. This information is not intended as a substitute for professional medical care. Always follow your healthcare professional's instructions.                Follow-ups after your visit        Follow-up notes from your care team     Return in about 1 year (around 10/3/2019) for Physical Exam, Routine Visit.      Who to contact     If you have questions or need follow up information about today's clinic visit or your schedule please contact [facility] directly at [phone].  Normal or non-critical lab and imaging results will be communicated to you by MyChart, letter or phone within 4 business days after the clinic has received the results. If you do not hear from us within 7 days, please contact the clinic through [employer] or phone. If you have a critical or abnormal lab result, we will notify you by phone as soon as possible.  Submit refill requests through [email or web address] or call your pharmacy and they will forward the refill request to us. Please allow 3 business days for your refill to be completed.          Additional Information About Your Visit        [employer] Information     [email or web address] gives you secure access to your electronic health record. If you see a primary care provider, you can also send messages to your care team and make appointments. If you have questions, please call your primary care clinic.  If you do not have a primary care provider, please call [phone] and they will assist you.        Care EveryWhere ID     This is your Care EveryWhere ID. This could be used by other organizations to access your [facility] medical records  [id number]        Your Vitals Were     Pulse Temperature Respirations Last Period Pulse  Oximetry Breastfeeding?    78 95.4  F (35.2  C) (Tympanic) 16 09/01/2008 (Exact Date) 100% No    BMI (Body Mass Index)                   25.13 kg/m2            Blood Pressure from Last 3 Encounters:   10/03/18 100/66   09/11/18 (!) 125/97   07/02/18 112/72    Weight from Last 3 Encounters:   10/03/18 151 lb (68.5 kg)   09/11/18 155 lb (70.3 kg)   07/02/18 155 lb (70.3 kg)              Today, you had the following     No orders found for display       Primary Care Provider Office Phone # Fax #    Joaquin Dudley [phone]       [address] [facility] [address]        Equal Access to Services     KAYDEN MORA : Dayana duponto Socindi, waaxda luqadaha, qaybta kaalmada adeegyamei, garcía vergara. So [facility] [phone].    ATENCIÓN: Si habla español, tiene a goldsmith disposición servicios gratuitos de asistencia lingüística. Llame al [phone].    We comply with applicable federal civil rights laws and [place] laws. We do not discriminate on the basis of race, color, national origin, age, disability, sex, sexual orientation, or gender identity.            Thank you!     Thank you for choosing [facility]  for your care. Our goal is always to provide you with excellent care. Hearing back from our patients is one way we can continue to improve our services. Please take a few minutes to complete the written survey that you may receive in the mail after your visit with us. Thank you!             Your Updated Medication List - Protect others around you: Learn how to safely use, store and throw away your medicines at www.disposemymeds.org.          This list is accurate as of 10/3/18  8:17 AM.  Always use your most recent med list.                   Brand Name Dispense Instructions for use Diagnosis    CALCIUM PO      Take by mouth daily        escitalopram 20 MG tablet    LEXAPRO    90 tablet    Take 1 tablet (20 mg) by mouth daily    Major depressive disorder,  single episode, moderate (H)       estradiol 0.075 MG/24HR Ptwk     12 patch    Place 1 patch onto the skin once a week    Symptomatic menopausal or female climacteric states       * levothyroxine 75 MCG tablet    SYNTHROID/LEVOTHROID    30 tablet    TAKE 1 TABLET DAILY, TAKE ALONG WITH 200 MCG TABLET DAILY (NEEDS FOLLOW UP APPOINTMENT PRIOR TO ADDITIONAL REFILLS)    Acquired hypothyroidism       * levothyroxine 200 MCG tablet    SYNTHROID/LEVOTHROID    30 tablet    TAKE 1 TABLET DAILY (NEEDS FOLLOW UP APPOINTMENT PRIOR TO ADDITIONAL REFILLS)    Acquired hypothyroidism       pantoprazole 40 MG EC tablet    PROTONIX    30 tablet    Take 1 tablet (40 mg) by mouth 2 times daily    Epigastric abdominal pain, Abdominal pain, right upper quadrant       ranitidine 300 MG tablet    ZANTAC    15 tablet    Take 1 tablet (300 mg) by mouth At Bedtime    Epigastric abdominal pain, Abdominal pain, right upper quadrant       VITAMIN D PO      Take by mouth daily        * Notice:  This list has 2 medication(s) that are the same as other medications prescribed for you. Read the directions carefully, and ask your doctor or other care provider to review them with you.

## 2018-10-03 NOTE — LETTER
My Depression Action Plan  Name: Lida Blood   Date of Birth 1985  Date: 10/3/2018    My doctor: Joaquin Dudley   My clinic: [place] [address] [facility] [address] [phone]          GREEN    ZONE   Good Control    What it looks like:     Things are going generally well. You have normal up s and down s. You may even feel depressed from time to time, but bad moods usually last less than a day.   What you need to do:  1. Continue to care for yourself (see self care plan)  2. Check your depression survival kit and update it as needed  3. Follow your physician s recommendations including any medication.  4. Do not stop taking medication unless you consult with your physician first.           YELLOW         ZONE Getting Worse    What it looks like:     Depression is starting to interfere with your life.     It may be hard to get out of bed; you may be starting to isolate yourself from others.    Symptoms of depression are starting to last most all day and this has happened for several days.     You may have suicidal thoughts but they are not constant.   What you need to do:     1. Call your care team, your response to treatment will improve if you keep your care team informed of your progress. Yellow periods are signs an adjustment may need to be made.     2. Continue your self-care, even if you have to fake it!    3. Talk to someone in your support network    4. Open up your depression survival kit           RED    ZONE Medical Alert - Get Help    What it looks like:     Depression is seriously interfering with your life.     You may experience these or other symptoms: You can t get out of bed most days, can t work or engage in other necessary activities, you have trouble taking care of basic hygiene, or basic responsibilities, thoughts of suicide or death that will not go away, self-injurious behavior.     What you need to do:  1. Call your care team and  request a same-day appointment. If they are not available (weekends or after hours) call your local crisis line, emergency room or 911.            Depression Self Care Plan / Survival Kit    Self-Care for Depression  Here s the deal. Your body and mind are really not as separate as most people think.  What you do and think affects how you feel and how you feel influences what you do and think. This means if you do things that people who feel good do, it will help you feel better.  Sometimes this is all it takes.  There is also a place for medication and therapy depending on how severe your depression is, so be sure to consult with your medical provider and/ or Behavioral Health Consultant if your symptoms are worsening or not improving.     In order to better manage my stress, I will:    Exercise  Get some form of exercise, every day. This will help reduce pain and release endorphins, the  feel good  chemicals in your brain. This is almost as good as taking antidepressants!  This is not the same as joining a gym and then never going! (they count on that by the way ) It can be as simple as just going for a walk or doing some gardening, anything that will get you moving.      Hygiene   Maintain good hygiene (Get out of bed in the morning, Make your bed, Brush your teeth, Take a shower, and Get dressed like you were going to work, even if you are unemployed).  If your clothes don't fit try to get ones that do.    Diet  I will strive to eat foods that are good for me, drink plenty of water, and avoid excessive sugar, caffeine, alcohol, and other mood-altering substances.  Some foods that are helpful in depression are: complex carbohydrates, B vitamins, flaxseed, fish or fish oil, fresh fruits and vegetables.    Psychotherapy  I agree to participate in Individual Therapy (if recommended).    Medication  If prescribed medications, I agree to take them.  Missing doses can result in serious side effects.  I understand that  drinking alcohol, or other illicit drug use, may cause potential side effects.  I will not stop my medication abruptly without first discussing it with my provider.    Staying Connected With Others  I will stay in touch with my friends, family members, and my primary care provider/team.    Use your imagination  Be creative.  We all have a creative side; it doesn t matter if it s oil painting, sand castles, or mud pies! This will also kick up the endorphins.    Witness Beauty  (AKA stop and smell the roses) Take a look outside, even in mid-winter. Notice colors, textures. Watch the squirrels and birds.     Service to others  Be of service to others.  There is always someone else in need.  By helping others we can  get out of ourselves  and remember the really important things.  This also provides opportunities for practicing all the other parts of the program.    Humor  Laugh and be silly!  Adjust your TV habits for less news and crime-drama and more comedy.    Control your stress  Try breathing deep, massage therapy, biofeedback, and meditation. Find time to relax each day.     My support system    Clinic Contact:  Phone number:    Contact 1:  Phone number:    Contact 2:  Phone number:    Yazdanism/:  Phone number:    Therapist:  Phone number:    Local crisis center:    Phone number:    Other community support:  Phone number:

## 2018-10-03 NOTE — PROGRESS NOTES
SUBJECTIVE:   Lida Blood is a 32 year old female who presents to clinic today for the following health issues:      Vaginal Lump      Duration: 6 months    Description  Lump has doubled in size in last 6 months, no swelling    Intensity:  moderate    Accompanying signs and symptoms (fever/dysuria/abdominal or back pain): None    History  Sexually active: yes, single partner, contraception - hysterectomy  Possibility of pregnancy: No  Recent antibiotic use: no     Precipitating or alleviating factors: None    Therapies tried and outcome: soaking   Outcome: didn't work    Mild pain, no drainage, no other vaginal symptoms.  No fevers or systemic symptoms.     Problem list and histories reviewed & adjusted, as indicated.  Additional history: as documented    Patient Active Problem List   Diagnosis     Anxiety state     Hypothyroidism     Backache     Major depressive disorder, single episode, moderate (H)     Endometriosis     Vaginal discharge     Surgery follow-up examination     CARDIOVASCULAR SCREENING; LDL GOAL LESS THAN 160     Physiological ovarian cysts     Migraine headache     Abdominal pain, acute, epigastric     RLS (restless legs syndrome)     Vomiting and diarrhea     Abdominal pain     Gastroesophageal reflux disease without esophagitis     Hypokalemia     Hypoglycemia     Gastric ulcer     Obesity     Dehydration     LUQ abdominal pain     Ileus (H)     S/P gastric bypass     Past Surgical History:   Procedure Laterality Date     C APPENDECTOMY  4/18/2003     C REMOVAL OF CERVIX  05/29/09    laparoscopic assisted vaginal trachelectomy     C SUPRACERV ABD HYSTERECTOMY  10/06/08     COLONOSCOPY  11/25/14     ESOPHAGOSCOPY, GASTROSCOPY, DUODENOSCOPY (EGD), COMBINED N/A 8/31/2015    Procedure: COMBINED ESOPHAGOSCOPY, GASTROSCOPY, DUODENOSCOPY (EGD), BIOPSY SINGLE OR MULTIPLE;  Surgeon: Toni Wiggins MD;  Location:  GI     ESOPHAGOSCOPY, GASTROSCOPY, DUODENOSCOPY (EGD), COMBINED N/A  8/18/2016    Procedure: COMBINED ESOPHAGOSCOPY, GASTROSCOPY, DUODENOSCOPY (EGD);  Surgeon: Jaswinder Waddell MD;  Location:  GI     ESOPHAGOSCOPY, GASTROSCOPY, DUODENOSCOPY (EGD), COMBINED N/A 3/1/2017    Procedure: COMBINED ESOPHAGOSCOPY, GASTROSCOPY, DUODENOSCOPY (EGD), BIOPSY SINGLE OR MULTIPLE;  Surgeon: Jan Johnston MD;  Location:  GI     ESOPHAGOSCOPY, GASTROSCOPY, DUODENOSCOPY (EGD), DILATATION, COMBINED N/A 4/13/2016    Procedure: COMBINED ESOPHAGOSCOPY, GASTROSCOPY, DUODENOSCOPY (EGD), DILATATION;  Surgeon: Jos Dinero MD;  Location:  GI     EYE SURGERY      bilat orbital decompression surgery      HC COLONOSCOPY W BIOPSY  1/22/2007     HC COLONOSCOPY W/WO BRUSH/WASH  12/16/04     HC CREATE EARDRUM OPENING,GEN ANESTH  12/29/88    Bilateral myringotomy with insertion of PE tubes     HC CREATE EARDRUM OPENING,GEN ANESTH  12/20/90    Bilateral myringotomy with insertion of PE tubes      LAP,FULGURATE/EXCISE LESIONS  06/16/08    Laparoscopy, ablation of the endometriosis and diagnostic cystoscopy     HC LAPAROSCOPY, SURGICAL, ABDOMEN, PERITONEUM & OMENTUM; DX W/ OR W/O SPECIMEN(S)  04/18/2003    Diagnostic laparoscopy     HC REMOVAL GALLBLADDER  8/1/03     HC TYMPANOPLASTY W/O MASTOID, INIT/REV W/O OSS CHAIN RECONST  10/03/00    Left exploratory tympanoplasty, myringoplasty, fascia grafting of the oval window, microdissection via the use of operative microscope      UGI ENDOSCOPY, SIMPLE EXAM  3/23/12, 11/13/14     HERNIA REPAIR, INCISIONAL  08/23/2006    Laparoscopic mesh repair.     HYSTERECTOMY, PAP NO LONGER INDICATED  5/29/09     HYSTEROSCOPY  02/08/08     LAPAROSCOPIC GASTRECTOMY N/A 3/15/2016    Procedure: LAPAROSCOPIC GASTRECTOMY;  Surgeon: Jos Dinero MD;  Location:  OR     LAPAROSCOPIC LYSIS ADHESIONS  11/02/10    [facility]     LAPAROSCOPIC REVISION GASTROPLASTY TO ELBERT-EN-Y N/A 10/26/2015    Procedure: LAPAROSCOPIC REVISION GASTROPLASTY TO  ELBERT-EN-Y;  Surgeon: Toni Wiggins MD;  Location: [facility] OR     LAPAROSCOPIC SALPINGO-OOPHORECTOMY Left 3/2015     LAPAROSCOPY DIAGNOSTIC (GENERAL) Right 06/23/11    [facility] abdomen[facility]pelvis[facility]     REPAIR TENDON PERONEAL Right 4/6/2018    Procedure: REPAIR TENDON PERONEAL;;  Surgeon: Delgado Martinez DPM;  Location: [facility] OR     STABILIZE TENDON PERONEAL Right 4/6/2018    Procedure: STABILIZE TENDON PERONEAL;  Right ankle stabilization with Arthrex internal brace, drill fibula with peroneal tendon re-grooving, peroneal tendon repair;  Surgeon: Delgado Martinez DPM;  Location: [facility] OR       Social History   Substance Use Topics     Smoking status: Former Smoker     Packs/day: 0.25     Years: 6.00     Types: Cigarettes     Quit date: 6/16/2015     Smokeless tobacco: Never Used      Comment: social     Alcohol use Yes      Comment: rare     Family History   Problem Relation Age of Onset     Breast Cancer Mother      age 42 on chemo and radiation     Gynecology Mother      endometriosis     Diabetes Maternal Grandmother      type 1     Thyroid Disease Maternal Grandmother      C.A.D. Maternal Grandmother      Cardiovascular Maternal Grandmother      HEART DISEASE Maternal Grandmother      HEART DISEASE Paternal Grandmother      Diabetes Maternal Grandfather      diabetes     Thyroid Disease Maternal Aunt      HEART DISEASE Maternal Aunt      great MGA     Gynecology Sister      endometriosis     Anesthesia Reaction No family hx of          Current Outpatient Prescriptions   Medication Sig Dispense Refill     CALCIUM PO Take by mouth daily       Cholecalciferol (VITAMIN D PO) Take by mouth daily       escitalopram (LEXAPRO) 20 MG tablet Take 1 tablet (20 mg) by mouth daily 90 tablet 1     estradiol 0.075 MG/24HR PTWK Place 1 patch onto the skin once a week 12 patch 3     levothyroxine (SYNTHROID/LEVOTHROID) 200 MCG tablet TAKE 1 TABLET DAILY (NEEDS FOLLOW UP APPOINTMENT PRIOR TO  ADDITIONAL REFILLS) 30 tablet 1     levothyroxine (SYNTHROID/LEVOTHROID) 75 MCG tablet TAKE 1 TABLET DAILY, TAKE ALONG WITH 200 MCG TABLET DAILY (NEEDS FOLLOW UP APPOINTMENT PRIOR TO ADDITIONAL REFILLS) 30 tablet 1     pantoprazole (PROTONIX) 40 MG EC tablet Take 1 tablet (40 mg) by mouth 2 times daily 30 tablet 0     ranitidine (ZANTAC) 300 MG tablet Take 1 tablet (300 mg) by mouth At Bedtime 15 tablet 0     [DISCONTINUED] levothyroxine (SYNTHROID) 25 mcg/mL SUSP Take 11 mLs (275 mcg) by mouth every morning (before breakfast) 330 mL 1     Allergies   Allergen Reactions     No Known Drug Allergies      BP Readings from Last 3 Encounters:   10/03/18 100/66   09/11/18 (!) 125/97   07/02/18 112/72    Wt Readings from Last 3 Encounters:   10/03/18 151 lb (68.5 kg)   09/11/18 155 lb (70.3 kg)   07/02/18 155 lb (70.3 kg)                    Reviewed and updated as needed this visit by clinical staff  Tobacco  Allergies  Meds  Med Hx  Surg Hx  Fam Hx  Soc Hx      Reviewed and updated as needed this visit by Provider         ROS:  Constitutional, HEENT, cardiovascular, pulmonary, gi and gu systems are negative, except as otherwise noted.    OBJECTIVE:     /66  Pulse 78  Temp 95.4  F (35.2  C) (Tympanic)  Resp 16  Wt 151 lb (68.5 kg)  LMP 09/01/2008 (Exact Date)  SpO2 100%  Breastfeeding? No  BMI 25.13 kg/m2  Body mass index is 25.13 kg/(m^2).  GENERAL: healthy, alert and no distress   (female): normal urethral meatus , vaginal mucosa pink, moist, well rugated and Bartholin's gland cyst left labia.      PROCEDURE:   Incision and drainage:  Effected area cleaned with betadine x 3 then wiped away with alcoholol.  1 pecent lidocaine without epineprhine used to infiltrate the area with good anethesia.  Number 11 scapel used to make a stab incion.  Serous drainage was removed . No gauze was needed as area was small. Appropraite wound care dressing applied.  Pt tolerated preocedure  well.        ASSESSMENT/PLAN:          1. Bartholin's gland cyst  This was drained today.  It does not appear infected.  We do not have sarabia catheters available so no packing was placed.  I instructed the patient on warm compresses if needed and let us know if this fails to fully resolve.  - DRAIN SKIN ABSCESS SIMPLE/SINGLE    See Patient Instructions    [facility]

## 2018-10-03 NOTE — PATIENT INSTRUCTIONS
If this comes back and is painful, let us know.    If it is not painful, you don't have to do anything about it.   You are due for a mammogram.     Bartholin s Cyst (No Infection)     The Bartholin s glands are very small glands found inside the vaginal lips (labia).     The Bartholin s glands are very small glands found inside the vaginal lips (labia). The glands make fluid to help keep the vagina moist. When the opening of a Bartholin s gland becomes blocked, the gland may swell and form a cyst. The cyst may vary in size from small to large (1 to 3 cm). It may feel like a firm lump within the labia.  Treatment depends on the size of the cyst, whether it causes symptoms, and whether it s infected. Small or uninfected cysts generally don t require treatment. Large cysts and those that are painful or infected will likely need to be treated. In these cases, the cyst may need to be opened with an incision and then drained. If you are over 40 years of age, your healthcare provide may recommend that the cyst be biopsied.  Home care  Your cyst does not need any treatment at this time. If you have some mild discomfort, you may be advised to take sitz baths. For this, you soak in a bath with a few inches of warm water, a few times a day, or as directed. This may help the cyst to rupture and drain in a few days. No restrictions on activities, such as sex, are needed.  Follow-up care  Follow up with your healthcare provider, or as advised.  When to seek medical advice  Call your healthcare provider right away if any of these occur:    Fever of 100.4 F (38 C) or higher, or as directed by your provider    Increased redness, pain, swelling, or foul-smelling drainage from the cyst or the area around it    Cyst grows larger or causes symptoms that bother you  Date Last Reviewed: 11/1/2017 2000-2017 [employer]. [address] [place] [address], [place] [address]. All rights reserved. This information is not intended as a  substitute for professional medical care. Always follow your healthcare professional's instructions.

## 2018-10-04 ENCOUNTER — TRANSFERRED RECORDS (OUTPATIENT)
Dept: HEALTH INFORMATION MANAGEMENT | Facility: CLINIC | Age: 33
End: 2018-10-04

## 2018-10-04 LAB
ALT SERPL-CCNC: 27 IU/L (ref 9–52)
AST SERPL-CCNC: 25 IU/L (ref 14–36)
CREAT SERPL-MCNC: 0.82 MG/DL (ref 0.52–1.04)
GFR SERPL CREATININE-BSD FRML MDRD: >60 ML/MIN/1.73M2
GLUCOSE SERPL-MCNC: 90 MG/DL (ref 65–100)
POTASSIUM SERPL-SCNC: 4.1 MMOL/L (ref 3.5–5)

## 2018-10-19 ENCOUNTER — OFFICE VISIT (OUTPATIENT)
Dept: FAMILY MEDICINE | Facility: OTHER | Age: 33
End: 2018-10-19
Payer: COMMERCIAL

## 2018-10-19 VITALS
HEIGHT: 65 IN | DIASTOLIC BLOOD PRESSURE: 80 MMHG | OXYGEN SATURATION: 100 % | HEART RATE: 76 BPM | WEIGHT: 152.2 LBS | RESPIRATION RATE: 16 BRPM | TEMPERATURE: 98.6 F | SYSTOLIC BLOOD PRESSURE: 110 MMHG | BODY MASS INDEX: 25.36 KG/M2

## 2018-10-19 DIAGNOSIS — Z01.818 PREOP GENERAL PHYSICAL EXAM: Primary | ICD-10-CM

## 2018-10-19 DIAGNOSIS — R10.13 ABDOMINAL PAIN, EPIGASTRIC: ICD-10-CM

## 2018-10-19 PROCEDURE — 99214 OFFICE O/P EST MOD 30 MIN: CPT | Performed by: NURSE PRACTITIONER

## 2018-10-19 RX ORDER — CYANOCOBALAMIN 1000 UG/ML
1000 INJECTION, SOLUTION INTRAMUSCULAR; SUBCUTANEOUS
COMMUNITY
Start: 2018-10-04 | End: 2019-03-21

## 2018-10-19 ASSESSMENT — PAIN SCALES - GENERAL: PAINLEVEL: NO PAIN (0)

## 2018-10-19 NOTE — PROGRESS NOTES
[facility]  [address] [address] [id number] [phone]  Dept: [phone]    PRE-OP EVALUATION:  Today's date: 10/19/2018    Lida Blood (: 1985) presents for pre-operative evaluation assessment as requested by Dr. Sarah.  She requires evaluation and anesthesia risk assessment prior to undergoing surgery/procedure for treatment of EGD .    Fax number for surgical facility: [phone] (Fax)   Primary Physician: Joaquin Dudley  Type of Anesthesia Anticipated: Local with MAC    Patient has a Health Care Directive or Living Will:  NO    Preop Questions 10/19/2018   Who is doing your surgery? Dr. Sarah   What are you having done? EGD   Date of Surgery/Procedure: 10-25-18   Facility or Hospital where procedure/surgery will be performed[facility]   1.  Do you have a history of Heart attack, stroke, stent, coronary bypass surgery, or other heart surgery? No   2.  Do you ever have any pain or discomfort in your chest? No   3.  Do you have a history of  Heart Failure? No   4.   Are you troubled by shortness of breath when:  walking on a level surface, or up a slight hill, or at night? No   5.  Do you currently have a cold, bronchitis or other respiratory infection? No   6.  Do you have a cough, shortness of breath, or wheezing? No   7.  Do you sometimes get pains in the calves of your legs when you walk? No   8. Do you or anyone in your family have previous history of blood clots? No   9.  Do you or does anyone in your family have a serious bleeding problem such as prolonged bleeding following surgeries or cuts? No   10. Have you ever had problems with anemia or been told to take iron pills? No   11. Have you had any abnormal blood loss such as black, tarry or bloody stools, or abnormal vaginal bleeding? No   12. Have you ever had a blood transfusion? No   13. Have you or any of your relatives ever had problems with anesthesia? No    14. Do you have sleep apnea, excessive snoring or daytime drowsiness? No   15. Do you have any prosthetic heart valves? No   16. Do you have prosthetic joints? No   17. Is there any chance that you may be pregnant? No         HPI:     HPI related to upcoming procedure: EGD to rule out ulcer       DEPRESSION - Patient has a long history of Depression of moderate severity requiring medication for control with recent symptoms being stable..Current symptoms of depression include none.                                                                                                                                                                                    .  HYPOTHYROIDISM - Patient has a longstanding history of chronic Hypothyroidism. Patient has been doing well, noting no tremor, insomnia, hair loss or changes in skin texture. Continues to take medications as directed, without adverse reactions or side effects. Last TSH   Lab Results   Component Value Date    TSH 18.67 (H) 03/28/2018   .                                                                                                                                                                                                                        .    MEDICAL HISTORY:     Patient Active Problem List    Diagnosis Date Noted     Abdominal pain 05/24/2013     Priority: High     S/P gastric bypass 08/10/2017     Priority: Medium     LUQ abdominal pain 08/16/2016     Priority: Medium     Gastric ulcer 12/02/2015     Priority: Medium     Hypokalemia 12/01/2015     Priority: Medium     Hypoglycemia 12/01/2015     Priority: Medium     Gastroesophageal reflux disease without esophagitis 10/16/2015     Priority: Medium     CARDIOVASCULAR SCREENING; LDL GOAL LESS THAN 160 10/31/2010     Priority: Medium     Major depressive disorder, single episode, moderate (H)      Priority: Medium     Hypothyroidism 09/26/2003     Priority: Medium     Problem list name updated by  automated process. Provider to review       Anxiety state 05/08/2002     Priority: Medium     Problem list name updated by automated process. Provider to review        Past Medical History:   Diagnosis Date     Endometriosis of pelvic peritoneum 10/06/08     Endometriosis of uterus 10/06/08    Admit. 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    s/p radioactive iodine ablation Rx     Major depressive disorder, single episode, moderate (H)      Migraine headache 10/14/2011     Obesity (BMI 30-39.9)      Other and unspecified ovarian cyst 03/02/10    d/c 03/04/10     Pain pelvic 12/14/04    Discharged 12/17/04-[facility]     PONV (postoperative nausea and vomiting)      S/P hysterectomy      Thyrotoxicosis without mention of goiter or other cause, without mention of thyrotoxic crisis or storm 2001     trachelectomy 05/19/09     Past Surgical History:   Procedure Laterality Date     C APPENDECTOMY  4/18/2003     C REMOVAL OF CERVIX  05/29/09    laparoscopic assisted vaginal trachelectomy     C SUPRACERV ABD HYSTERECTOMY  10/06/08     COLONOSCOPY  11/25/14     ESOPHAGOSCOPY, GASTROSCOPY, DUODENOSCOPY (EGD), COMBINED N/A 8/31/2015    Procedure: COMBINED ESOPHAGOSCOPY, GASTROSCOPY, DUODENOSCOPY (EGD), BIOPSY SINGLE OR MULTIPLE;  Surgeon: Toni Wiggins MD;  Location:  GI     ESOPHAGOSCOPY, GASTROSCOPY, DUODENOSCOPY (EGD), COMBINED N/A 8/18/2016    Procedure: COMBINED ESOPHAGOSCOPY, GASTROSCOPY, DUODENOSCOPY (EGD);  Surgeon: Jaswinder Waddell MD;  Location:  GI     ESOPHAGOSCOPY, GASTROSCOPY, DUODENOSCOPY (EGD), COMBINED N/A 3/1/2017    Procedure: COMBINED ESOPHAGOSCOPY, GASTROSCOPY, DUODENOSCOPY (EGD), BIOPSY SINGLE OR MULTIPLE;  Surgeon: Jan Johnston MD;  Location:  GI     ESOPHAGOSCOPY, GASTROSCOPY, DUODENOSCOPY (EGD), DILATATION, COMBINED N/A 4/13/2016    Procedure: COMBINED ESOPHAGOSCOPY, GASTROSCOPY,  DUODENOSCOPY (EGD), DILATATION;  Surgeon: Jos Dinero MD;  Location: [facility] GI     EYE SURGERY      bilat orbital decompression surgery      HC COLONOSCOPY W BIOPSY  1/22/2007     HC COLONOSCOPY W/WO BRUSH/WASH  12/16/04     HC CREATE EARDRUM OPENING,GEN ANESTH  12/29/88    Bilateral myringotomy with insertion of PE tubes     HC CREATE EARDRUM OPENING,GEN ANESTH  12/20/90    Bilateral myringotomy with insertion of PE tubes     HC LAP,FULGURATE/EXCISE LESIONS  06/16/08    Laparoscopy, ablation of the endometriosis and diagnostic cystoscopy     HC LAPAROSCOPY, SURGICAL, ABDOMEN, PERITONEUM & OMENTUM; DX W/ OR W/O SPECIMEN(S)  04/18/2003    Diagnostic laparoscopy     HC REMOVAL GALLBLADDER  8/1/03     HC TYMPANOPLASTY W/O MASTOID, INIT/REV W/O OSS CHAIN RECONST  10/03/00    Left exploratory tympanoplasty, myringoplasty, fascia grafting of the oval window, microdissection via the use of operative microscope     HC UGI ENDOSCOPY, SIMPLE EXAM  3/23/12, 11/13/14     HERNIA REPAIR, INCISIONAL  08/23/2006    Laparoscopic mesh repair.     HYSTERECTOMY, PAP NO LONGER INDICATED  5/29/09     HYSTEROSCOPY  02/08/08     LAPAROSCOPIC GASTRECTOMY N/A 3/15/2016    Procedure: LAPAROSCOPIC GASTRECTOMY;  Surgeon: Jos Dinero MD;  Location:  OR     LAPAROSCOPIC LYSIS ADHESIONS  11/02/10    [facility]     LAPAROSCOPIC REVISION GASTROPLASTY TO ELBERT-EN-Y N/A 10/26/2015    Procedure: LAPAROSCOPIC REVISION GASTROPLASTY TO ELBERT-EN-Y;  Surgeon: Toni Wiggins MD;  Location: [facility] OR     LAPAROSCOPIC SALPINGO-OOPHORECTOMY Left 3/2015     LAPAROSCOPY DIAGNOSTIC (GENERAL) Right 06/23/11    Evaluation of abdomen/pelvis, RSO, Cysto; [facility]     REPAIR TENDON PERONEAL Right 4/6/2018    Procedure: REPAIR TENDON PERONEAL;;  Surgeon: Delgado Martinez DPM;  Location: [facility] OR     STABILIZE TENDON PERONEAL Right 4/6/2018    Procedure: STABILIZE TENDON PERONEAL;  Right ankle stabilization with Arthrex internal brace,  "drill fibula with peroneal tendon re-grooving, peroneal tendon repair;  Surgeon: Delgado Martinez DPM;  Location:  OR     Current Outpatient Prescriptions   Medication Sig Dispense Refill     CALCIUM PO Take by mouth daily       Cholecalciferol (VITAMIN D PO) Take by mouth daily       cyanocobalamin (VITAMIN B12) 1000 MCG/ML injection Inject 1,000 mcg into the muscle       escitalopram (LEXAPRO) 20 MG tablet Take 1 tablet (20 mg) by mouth daily 90 tablet 1     estradiol 0.075 MG/24HR PTWK Place 1 patch onto the skin once a week 12 patch 3     levothyroxine (SYNTHROID/LEVOTHROID) 200 MCG tablet TAKE 1 TABLET DAILY (NEEDS FOLLOW UP APPOINTMENT PRIOR TO ADDITIONAL REFILLS) 30 tablet 1     levothyroxine (SYNTHROID/LEVOTHROID) 75 MCG tablet TAKE 1 TABLET DAILY, TAKE ALONG WITH 200 MCG TABLET DAILY (NEEDS FOLLOW UP APPOINTMENT PRIOR TO ADDITIONAL REFILLS) 30 tablet 1     pantoprazole (PROTONIX) 40 MG EC tablet Take 1 tablet (40 mg) by mouth 2 times daily 30 tablet 0     ranitidine (ZANTAC) 300 MG tablet Take 1 tablet (300 mg) by mouth At Bedtime 15 tablet 0     OTC products: None, except as noted above    Allergies   Allergen Reactions     No Known Drug Allergies       Latex Allergy: NO    Social History   Substance Use Topics     Smoking status: Former Smoker     Packs/day: 0.25     Years: 6.00     Types: Cigarettes     Quit date: 6/16/2015     Smokeless tobacco: Never Used      Comment: social     Alcohol use Yes      Comment: rare     History   Drug Use No       REVIEW OF SYSTEMS:   Constitutional, neuro, ENT, endocrine, pulmonary, cardiac, gastrointestinal, genitourinary, musculoskeletal, integument and psychiatric systems are negative, except as otherwise noted.    EXAM:   /80  Pulse 76  Temp 98.6  F (37  C) (Temporal)  Resp 16  Ht 5' 5.16\" (1.655 m)  Wt 152 lb 3.2 oz (69 kg)  LMP 09/01/2008 (Exact Date)  SpO2 100%  BMI 25.21 kg/m2    GENERAL APPEARANCE: healthy, alert and no distress     EYES: " EOMI, PERRL     HENT: ear canals and TM's normal and nose and mouth without ulcers or lesions     NECK: no adenopathy, no asymmetry, masses, or scars and thyroid normal to palpation     RESP: lungs clear to auscultation - no rales, rhonchi or wheezes     CV: regular rates and rhythm, normal S1 S2, no S3 or S4 and no murmur, click or rub     ABDOMEN:  soft, nontender, no HSM or masses and bowel sounds normal     MS: extremities normal- no gross deformities noted, no evidence of inflammation in joints, FROM in all extremities.     SKIN: no suspicious lesions or rashes     NEURO: Normal strength and tone, sensory exam grossly normal, mentation intact and speech normal     PSYCH: mentation appears normal. and affect normal/bright     LYMPHATICS: No cervical adenopathy    DIAGNOSTICS:   No labs or EKG required for low risk surgery (cataract, skin procedure, breast biopsy, etc)    Recent Labs   Lab Test  09/11/18   1035  03/28/18   0840  11/21/17   0813  07/17/17   1225   03/27/17   0613   03/29/16   1352  12/10/13   HGB  12.8  12.4  12.4  12.4   < >  Unsatisfactory specimen - clotted  JOHN PADRON,06 03/27/17 EAB     < >  12.6   < >   --    PLT  287   --   333  276   < >  Unsatisfactory specimen - clotted  JOHN PADRON,06 03/27/17 EAB     < >  436   < >   --    INR   --    --    --    --    --   0.98   --   0.95   < >   --    NA  141   --    --   141   --   138   < >  140   < >   --    POTASSIUM  4.0   --    --   4.0   --   4.6   < >  3.9   < >   --    CR  0.69   --    --   0.84   --   0.92   < >  0.69   < >   --    A1C   --    --    --    --    --    --    --    --    --   5.2    < > = values in this interval not displayed.        IMPRESSION:   Reason for surgery/procedure: left upper abdominal epigastric pain follow up endoscopy      The proposed surgical procedure is considered LOW risk.    REVISED CARDIAC RISK INDEX  The patient has the following serious cardiovascular risks for perioperative complications such  as (MI, PE, VFib and 3  AV Block):  No serious cardiac risks  INTERPRETATION: 0 risks: Class I (very low risk - 0.4% complication rate)    The patient has the following additional risks for perioperative complications:  No identified additional risks      ICD-10-CM    1. Preop general physical exam Z01.818    2. Abdominal pain, epigastric R10.13     left epigastric       RECOMMENDATIONS:     --Patient is to take all scheduled medications on the day of surgery EXCEPT for modifications listed below.    APPROVAL GIVEN to proceed with proposed procedure, without further diagnostic evaluation       Signed Electronically by: KATE Kelly CNP    Copy of this evaluation report is provided to requesting physician.    [facility] Preop Guidelines    Revised Cardiac Risk Index

## 2018-10-19 NOTE — PATIENT INSTRUCTIONS
Before Your Surgery      Call your surgeon if there is any change in your health. This includes signs of a cold or flu (such as a sore throat, runny nose, cough, rash or fever).    Do not smoke, drink alcohol or take over the counter medicine (unless your surgeon or primary care doctor tells you to) for the 24 hours before and after surgery.    If you take prescribed drugs: Follow your doctor s orders about which medicines to take and which to stop until after surgery.    Eating and drinking prior to surgery: follow the instructions from your surgeon    Take a shower or bath the night before surgery. Use the soap your surgeon gave you to gently clean your skin. If you do not have soap from your surgeon, use your regular soap. Do not shave or scrub the surgery site.  Wear clean pajamas and have clean sheets on your bed.     Before Your Surgery      Call your surgeon if there is any change in your health. This includes signs of a cold or flu (such as a sore throat, runny nose, cough, rash or fever).    Do not smoke, drink alcohol or take over the counter medicine (unless your surgeon or primary care doctor tells you to) for the 24 hours before and after surgery.    If you take prescribed drugs: Follow your doctor s orders about which medicines to take and which to stop until after surgery.    Eating and drinking prior to surgery: follow the instructions from your surgeon    Take a shower or bath the night before surgery. Use the soap your surgeon gave you to gently clean your skin. If you do not have soap from your surgeon, use your regular soap. Do not shave or scrub the surgery site.  Wear clean pajamas and have clean sheets on your bed.     Please follow guidelines from surgery center for eating and drinking as well as medications as reviewed today.     Thank you  Ivanna Pettit CNP

## 2018-10-19 NOTE — MR AVS SNAPSHOT
After Visit Summary   10/19/2018    Lida Blood    MRN: [id number]           Patient Information     Date Of Birth          1985        Visit Information        Provider Department      10/19/2018 3:20 PM Ivanna Pettit APRN [facility]        Today's Diagnoses     Preop general physical exam    -  1    Abdominal pain, epigastric          Care Instructions      Before Your Surgery      Call your surgeon if there is any change in your health. This includes signs of a cold or flu (such as a sore throat, runny nose, cough, rash or fever).    Do not smoke, drink alcohol or take over the counter medicine (unless your surgeon or primary care doctor tells you to) for the 24 hours before and after surgery.    If you take prescribed drugs: Follow your doctor s orders about which medicines to take and which to stop until after surgery.    Eating and drinking prior to surgery: follow the instructions from your surgeon    Take a shower or bath the night before surgery. Use the soap your surgeon gave you to gently clean your skin. If you do not have soap from your surgeon, use your regular soap. Do not shave or scrub the surgery site.  Wear clean pajamas and have clean sheets on your bed.     Before Your Surgery      Call your surgeon if there is any change in your health. This includes signs of a cold or flu (such as a sore throat, runny nose, cough, rash or fever).    Do not smoke, drink alcohol or take over the counter medicine (unless your surgeon or primary care doctor tells you to) for the 24 hours before and after surgery.    If you take prescribed drugs: Follow your doctor s orders about which medicines to take and which to stop until after surgery.    Eating and drinking prior to surgery: follow the instructions from your surgeon    Take a shower or bath the night before surgery. Use the soap your surgeon gave you to gently clean your skin. If you do not have  "soap from your surgeon, use your regular soap. Do not shave or scrub the surgery site.  Wear clean pajamas and have clean sheets on your bed.     Please follow guidelines from surgery center for eating and drinking as well as medications as reviewed today.     Thank you  Ivanna Pettit CNP            Follow-ups after your visit        Who to contact     If you have questions or need follow up information about today's clinic visit or your schedule please contact [facility] directly at [phone].  Normal or non-critical lab and imaging results will be communicated to you by deltamethodhart, letter or phone within 4 business days after the clinic has received the results. If you do not hear from us within 7 days, please contact the clinic through Elance or phone. If you have a critical or abnormal lab result, we will notify you by phone as soon as possible.  Submit refill requests through Elance or call your pharmacy and they will forward the refill request to us. Please allow 3 business days for your refill to be completed.          Additional Information About Your Visit        [employer]eltamethod[employer] Information     Elance gives you secure access to your electronic health record. If you see a primary care provider, you can also send messages to your care team and make appointments. If you have questions, please call your primary care clinic.  If you do not have a primary care provider, please call [phone] and they will assist you.        Care EveryWhere ID     This is your Care EveryWhere ID. This could be used by other organizations to access your [place] medical records  [id number]        Your Vitals Were     Pulse Temperature Respirations Height Last Period Pulse Oximetry    76 98.6  F (37  C) (Temporal) 16 5' 5.16\" (1.655 m) 09/01/2008 (Exact Date) 100%    BMI (Body Mass Index)                   25.21 kg/m2            Blood Pressure from Last 3 Encounters:   10/19/18 110/80   10/03/18 100/66   09/11/18 " (!) 125/97    Weight from Last 3 Encounters:   10/19/18 152 lb 3.2 oz (69 kg)   10/03/18 151 lb (68.5 kg)   09/11/18 155 lb (70.3 kg)              Today, you had the following     No orders found for display       Primary Care Provider Office Phone # Fax #    Joaquin Dudley [phone]       [address] [facility] [place] [address]        Equal Access to Services     LOLITA MORA : Hadii aad ku hadasho Soomaali, waaxda luqadaha, qaybta kaalmada adeegyada, waxay idiin hayaan ademaria guadalupe cee . So [facility] [phone].    ATENCIÓN: Si rhonda reyna, tiene a goldsmith disposición servicios gratuitos de asistencia lingüística. [facility] [phone].    We comply with applicable federal civil rights laws and [place] laws. We do not discriminate on the basis of race, color, national origin, age, disability, sex, sexual orientation, or gender identity.            Thank you!     Thank you for choosing [facility]  for your care. Our goal is always to provide you with excellent care. Hearing back from our patients is one way we can continue to improve our services. Please take a few minutes to complete the written survey that you may receive in the mail after your visit with us. Thank you!             Your Updated Medication List - Protect others around you: Learn how to safely use, store and throw away your medicines at www.disposemymeds.org.          This list is accurate as of 10/19/18  3:41 PM.  Always use your most recent med list.                   Brand Name Dispense Instructions for use Diagnosis    CALCIUM PO      Take by mouth daily        cyanocobalamin 1000 MCG/ML injection    VITAMIN B12     Inject 1,000 mcg into the muscle        escitalopram 20 MG tablet    LEXAPRO    90 tablet    Take 1 tablet (20 mg) by mouth daily    Major depressive disorder, single episode, moderate (H)       estradiol 0.075 MG/24HR Ptwk     12 patch    Place 1 patch onto the skin once a week    Symptomatic menopausal  or female climacteric states       * levothyroxine 75 MCG tablet    SYNTHROID/LEVOTHROID    30 tablet    TAKE 1 TABLET DAILY, TAKE ALONG WITH 200 MCG TABLET DAILY (NEEDS FOLLOW UP APPOINTMENT PRIOR TO ADDITIONAL REFILLS)    Acquired hypothyroidism       * levothyroxine 200 MCG tablet    SYNTHROID/LEVOTHROID    30 tablet    TAKE 1 TABLET DAILY (NEEDS FOLLOW UP APPOINTMENT PRIOR TO ADDITIONAL REFILLS)    Acquired hypothyroidism       pantoprazole 40 MG EC tablet    PROTONIX    30 tablet    Take 1 tablet (40 mg) by mouth 2 times daily    Epigastric abdominal pain, Abdominal pain, right upper quadrant       ranitidine 300 MG tablet    ZANTAC    15 tablet    Take 1 tablet (300 mg) by mouth At Bedtime    Epigastric abdominal pain, Abdominal pain, right upper quadrant       VITAMIN D PO      Take by mouth daily        * Notice:  This list has 2 medication(s) that are the same as other medications prescribed for you. Read the directions carefully, and ask your doctor or other care provider to review them with you.

## 2018-10-25 ENCOUNTER — TRANSFERRED RECORDS (OUTPATIENT)
Dept: HEALTH INFORMATION MANAGEMENT | Facility: CLINIC | Age: 33
End: 2018-10-25

## 2019-02-13 ENCOUNTER — TRANSFERRED RECORDS (OUTPATIENT)
Dept: HEALTH INFORMATION MANAGEMENT | Facility: CLINIC | Age: 34
End: 2019-02-13

## 2019-02-13 LAB
ALT SERPL-CCNC: 10 U/L (ref 8–45)
AST SERPL-CCNC: 22 U/L (ref 5–41)
CREAT SERPL-MCNC: 0.81 MG/DL (ref 0.57–1.11)
GFR SERPL CREATININE-BSD FRML MDRD: >60 ML/MIN/1.73M2
GLUCOSE SERPL-MCNC: 81 MG/DL (ref 70–100)
POTASSIUM SERPL-SCNC: 4.2 MMOL/L (ref 3.5–5.1)

## 2019-02-15 ENCOUNTER — TRANSFERRED RECORDS (OUTPATIENT)
Dept: HEALTH INFORMATION MANAGEMENT | Facility: CLINIC | Age: 34
End: 2019-02-15

## 2019-02-15 DIAGNOSIS — N95.1 SYMPTOMATIC MENOPAUSAL OR FEMALE CLIMACTERIC STATES: ICD-10-CM

## 2019-02-15 RX ORDER — ESTRADIOL 0.07 MG/D
PATCH TRANSDERMAL
Qty: 12 PATCH | Refills: 0 | Status: SHIPPED | OUTPATIENT
Start: 2019-02-15 | End: 2019-05-12

## 2019-02-15 NOTE — TELEPHONE ENCOUNTER
"Requested Prescriptions   Pending Prescriptions Disp Refills     estradiol (CLIMARA) 0.075 MG/24HR weekly patch [Pharmacy Med Name: ESTRADIOL(CLIMARA)TD PATCH 1S 0.075MG]  0    Last Written Prescription Date:  11/30/18  Last Fill Quantity: 4 patches,  # refills: 0   Last office visit: 10/19/2018 with prescribing provider:     Future Office Visit:   Next 5 appointments (look out 90 days)    Feb 22, 2019 11:20 AM CST  Office Visit with Joaquin Dudley MD  [facility] ([facility] [address] [facility] [id number] [phone]          Sig: PLACE 1 PATCH ON THE SKIN ONCE A WEEK (NEED AN APPOINTMENT FOR FURTHER REFILLS)    Hormone Replacement Therapy Passed - 2/15/2019  9:14 AM       Passed - Blood pressure under 140/90 in past 12 months    BP Readings from Last 3 Encounters:   10/19/18 110/80   10/03/18 100/66   09/11/18 (!) 125/97          Passed - Recent (12 mo) or future (30 days) visit within the authorizing provider's specialty    Patient had office visit in the last 12 months or has a visit in the next 30 days with authorizing provider or within the authorizing provider's specialty.  See \"Patient Info\" tab in inbasket, or \"Choose Columns\" in Meds & Orders section of the refill encounter.           Passed - Medication is active on med list       Passed - Patient is 18 years of age or older       Passed - No active pregnancy on record       Passed - No positive pregnancy test on record in past 12 months      Routing refill request to provider for review/approval because:  Shayy given x1 and patient did not follow up, please advise    Will forward to schedulers to schedule patient for overdue office visit    NAGA Tamez RN            "

## 2019-03-22 ENCOUNTER — MYC MEDICAL ADVICE (OUTPATIENT)
Dept: FAMILY MEDICINE | Facility: OTHER | Age: 34
End: 2019-03-22

## 2019-03-22 DIAGNOSIS — Z80.3 FAMILY HISTORY OF MALIGNANT NEOPLASM OF BREAST: Primary | ICD-10-CM

## 2019-03-26 ENCOUNTER — HOSPITAL ENCOUNTER (OUTPATIENT)
Dept: MAMMOGRAPHY | Facility: CLINIC | Age: 34
Discharge: HOME | End: 2019-03-26
Attending: FAMILY MEDICINE | Admitting: FAMILY MEDICINE
Payer: COMMERCIAL

## 2019-03-26 DIAGNOSIS — Z80.3 FAMILY HISTORY OF MALIGNANT NEOPLASM OF BREAST: ICD-10-CM

## 2019-03-26 PROCEDURE — 77063 BREAST TOMOSYNTHESIS BI: CPT

## 2019-04-16 ENCOUNTER — TRANSFERRED RECORDS (OUTPATIENT)
Dept: HEALTH INFORMATION MANAGEMENT | Facility: CLINIC | Age: 34
End: 2019-04-16

## 2019-04-16 NOTE — NURSING NOTE
"Chief Complaint   Patient presents with     ER F/U     7/17/17 ulcer       Initial /72  Pulse 87  Temp 97.2  F (36.2  C) (Tympanic)  Resp 20  Wt 155 lb (70.3 kg)  LMP 09/01/2008  SpO2 98%  Breastfeeding? No  BMI 25.79 kg/m2 Estimated body mass index is 25.79 kg/(m^2) as calculated from the following:    Height as of 5/24/17: 5' 5\" (1.651 m).    Weight as of this encounter: 155 lb (70.3 kg).  Medication Reconciliation: complete   ................Tj Gomez LPN,   July 19, 2017,      10:39 AM,   [facility]    "
18.8

## 2019-05-08 ENCOUNTER — TRANSFERRED RECORDS (OUTPATIENT)
Dept: HEALTH INFORMATION MANAGEMENT | Facility: CLINIC | Age: 34
End: 2019-05-08

## 2019-05-12 DIAGNOSIS — N95.1 SYMPTOMATIC MENOPAUSAL OR FEMALE CLIMACTERIC STATES: ICD-10-CM

## 2019-05-14 RX ORDER — ESTRADIOL 0.07 MG/D
PATCH TRANSDERMAL
Qty: 4 PATCH | Refills: 0 | Status: SHIPPED | OUTPATIENT
Start: 2019-05-14 | End: 2019-07-25

## 2019-05-14 NOTE — TELEPHONE ENCOUNTER
Routing refill request to provider for review/approval because:  Shayy given x1 and patient did not follow up, please advise  Patient has not been seen for this medication in over a year    LINK RomeroN, RN  [facility]

## 2019-05-14 NOTE — TELEPHONE ENCOUNTER
"Requested Prescriptions   Pending Prescriptions Disp Refills     estradiol (CLIMARA) 0.075 MG/24HR weekly patch [Pharmacy Med Name: ESTRADIOL(CLIMARA)TD PATCH 1S 0.075MG]  0     Sig: APPLY 1 PATCH ON THE SKIN ONCE A WEEK (NEED AN APPOINTMENT FOR FURTHER REFILLS)   Last Written Prescription Date:  2/15/19  Last Fill Quantity: 12,  # refills: 0   Last office visit: 10/19/2018 with prescribing provider:  10/3/18   Future Office Visit:        Hormone Replacement Therapy Passed - 5/12/2019  5:40 AM        Passed - Blood pressure under 140/90 in past 12 months     BP Readings from Last 3 Encounters:   10/19/18 110/80   10/03/18 100/66   09/11/18 (!) 125/97                 Passed - Recent (12 mo) or future (30 days) visit within the authorizing provider's specialty     Patient had office visit in the last 12 months or has a visit in the next 30 days with authorizing provider or within the authorizing provider's specialty.  See \"Patient Info\" tab in inbasket, or \"Choose Columns\" in Meds & Orders section of the refill encounter.              Passed - Medication is active on med list        Passed - Patient is 18 years of age or older        Passed - No active pregnancy on record        Passed - No positive pregnancy test on record in past 12 months        "

## 2019-05-16 ENCOUNTER — TRANSFERRED RECORDS (OUTPATIENT)
Dept: HEALTH INFORMATION MANAGEMENT | Facility: CLINIC | Age: 34
End: 2019-05-16

## 2019-07-25 DIAGNOSIS — N95.1 SYMPTOMATIC MENOPAUSAL OR FEMALE CLIMACTERIC STATES: ICD-10-CM

## 2019-07-29 RX ORDER — ESTRADIOL 0.07 MG/D
PATCH TRANSDERMAL
Qty: 4 PATCH | Refills: 0 | Status: SHIPPED | OUTPATIENT
Start: 2019-07-29 | End: 2019-10-14

## 2019-07-29 NOTE — TELEPHONE ENCOUNTER
"Last Written Prescription Date:  5/14/19  Last Fill Quantity: 4,  # refills: 0   Last office visit: 10/19/2018 with prescribing provider:  Joaquin Dudley   Future Office Visit:      Requested Prescriptions   Pending Prescriptions Disp Refills     estradiol (CLIMARA) 0.075 MG/24HR weekly patch [Pharmacy Med Name: ESTRADIOL(CLIMARA)TD PATCH 1S 0.075MG]  0     Sig: APPLY 1 PATCH ON THE SKIN ONCE A WEEK (NEED AN APPOINTMENT FOR FURTHER REFILLS)       Hormone Replacement Therapy Passed - 7/29/2019  8:10 AM        Passed - Blood pressure under 140/90 in past 12 months     BP Readings from Last 3 Encounters:   10/19/18 110/80   10/03/18 100/66   09/11/18 (!) 125/97                 Passed - Recent (12 mo) or future (30 days) visit within the authorizing provider's specialty     Patient had office visit in the last 12 months or has a visit in the next 30 days with authorizing provider or within the authorizing provider's specialty.  See \"Patient Info\" tab in inbasket, or \"Choose Columns\" in Meds & Orders section of the refill encounter.              Passed - Medication is active on med list        Passed - Patient is 18 years of age or older        Passed - No active pregnancy on record        Passed - No positive pregnancy test on record in past 12 months        Prescription approved per [facility] [facility].    Jennifer Fitzgerald RN on 7/29/2019 at 9:24 AM    "

## 2019-08-27 ENCOUNTER — TRANSFERRED RECORDS (OUTPATIENT)
Dept: HEALTH INFORMATION MANAGEMENT | Facility: CLINIC | Age: 34
End: 2019-08-27

## 2019-08-27 LAB — PHQ9 SCORE: 14

## 2019-09-28 ENCOUNTER — HEALTH MAINTENANCE LETTER (OUTPATIENT)
Age: 34
End: 2019-09-28

## 2020-04-29 DIAGNOSIS — N95.1 SYMPTOMATIC MENOPAUSAL OR FEMALE CLIMACTERIC STATES: ICD-10-CM

## 2020-04-30 RX ORDER — ESTRADIOL 0.07 MG/D
PATCH TRANSDERMAL
Qty: 12 PATCH | Refills: 0 | Status: SHIPPED | OUTPATIENT
Start: 2020-04-30

## 2020-04-30 NOTE — TELEPHONE ENCOUNTER
Per patient she is no longer being seen through [facility]. Margo Chakraborty LPN........4/30/2020 1:40 PM

## 2020-04-30 NOTE — TELEPHONE ENCOUNTER
Pending Prescriptions:                       Disp   Refills    estradiol (CLIMARA) 0.075 MG/24HR weekly p*       12       Sig: APPLY 1 PATCH ON THE SKIN ONCE A WEEK (NEED AN           APPOINTMENT FOR FURTHER REFILLS NEED OFFICE VISIT           FOR FURTHER REFILLS)    Last Written Prescription Date:  10/17/19  Last Fill Quantity: 4,  # refills: 0   Last office visit: 10/19/2018 with prescribing provider:  Cipriano   Future Office Visit:      Routing refill request to provider for review/approval because:  A break in medication  Patient needs to be seen because it has been more than 1 year since last office visit.    Diane Sheikh RN on 4/30/2020 at 9:19 AM

## 2020-08-25 ENCOUNTER — HOSPITAL ENCOUNTER (EMERGENCY)
Facility: CLINIC | Age: 35
Discharge: HOME | End: 2020-08-25
Attending: NURSE PRACTITIONER | Admitting: NURSE PRACTITIONER
Payer: COMMERCIAL

## 2020-08-25 ENCOUNTER — APPOINTMENT (OUTPATIENT)
Dept: CT IMAGING | Facility: CLINIC | Age: 35
End: 2020-08-25
Attending: NURSE PRACTITIONER
Payer: COMMERCIAL

## 2020-08-25 VITALS
TEMPERATURE: 98.4 F | OXYGEN SATURATION: 96 % | RESPIRATION RATE: 16 BRPM | WEIGHT: 160 LBS | SYSTOLIC BLOOD PRESSURE: 116 MMHG | HEART RATE: 74 BPM | BODY MASS INDEX: 26.5 KG/M2 | DIASTOLIC BLOOD PRESSURE: 77 MMHG

## 2020-08-25 DIAGNOSIS — R10.2 PELVIC PAIN IN FEMALE: ICD-10-CM

## 2020-08-25 DIAGNOSIS — N94.10 DYSPAREUNIA IN FEMALE: ICD-10-CM

## 2020-08-25 LAB
ALBUMIN UR-MCNC: NEGATIVE MG/DL
ANION GAP SERPL CALCULATED.3IONS-SCNC: 4 MMOL/L (ref 3–14)
APPEARANCE UR: CLEAR
BACTERIA #/AREA URNS HPF: ABNORMAL /HPF
BASOPHILS # BLD AUTO: 0.1 10E9/L (ref 0–0.2)
BASOPHILS NFR BLD AUTO: 0.9 %
BILIRUB UR QL STRIP: NEGATIVE
BUN SERPL-MCNC: 11 MG/DL (ref 7–30)
CALCIUM SERPL-MCNC: 8.7 MG/DL (ref 8.5–10.1)
CHLORIDE SERPL-SCNC: 108 MMOL/L (ref 94–109)
CO2 SERPL-SCNC: 29 MMOL/L (ref 20–32)
COLOR UR AUTO: YELLOW
CREAT SERPL-MCNC: 0.8 MG/DL (ref 0.52–1.04)
DIFFERENTIAL METHOD BLD: NORMAL
EOSINOPHIL NFR BLD AUTO: 1.4 %
ERYTHROCYTE [DISTWIDTH] IN BLOOD BY AUTOMATED COUNT: 12.9 % (ref 10–15)
GFR SERPL CREATININE-BSD FRML MDRD: >90 ML/MIN/{1.73_M2}
GLUCOSE SERPL-MCNC: 77 MG/DL (ref 70–99)
GLUCOSE UR STRIP-MCNC: NEGATIVE MG/DL
HCT VFR BLD AUTO: 38.7 % (ref 35–47)
HGB BLD-MCNC: 12.8 G/DL (ref 11.7–15.7)
HGB UR QL STRIP: NEGATIVE
IMM GRANULOCYTES # BLD: 0 10E9/L (ref 0–0.4)
IMM GRANULOCYTES NFR BLD: 0.3 %
KETONES UR STRIP-MCNC: NEGATIVE MG/DL
LEUKOCYTE ESTERASE UR QL STRIP: NEGATIVE
LYMPHOCYTES # BLD AUTO: 1.3 10E9/L (ref 0.8–5.3)
LYMPHOCYTES NFR BLD AUTO: 22.7 %
MCH RBC QN AUTO: 31.9 PG (ref 26.5–33)
MCHC RBC AUTO-ENTMCNC: 33.1 G/DL (ref 31.5–36.5)
MCV RBC AUTO: 97 FL (ref 78–100)
MONOCYTES # BLD AUTO: 0.6 10E9/L (ref 0–1.3)
MONOCYTES NFR BLD AUTO: 9.6 %
MUCOUS THREADS #/AREA URNS LPF: PRESENT /LPF
NEUTROPHILS # BLD AUTO: 3.8 10E9/L (ref 1.6–8.3)
NEUTROPHILS NFR BLD AUTO: 65.1 %
NITRATE UR QL: NEGATIVE
NRBC # BLD AUTO: 0 10*3/UL
NRBC BLD AUTO-RTO: 0 /100
PH UR STRIP: 7 PH (ref 5–7)
PLATELET # BLD AUTO: 278 10E9/L (ref 150–450)
POTASSIUM SERPL-SCNC: 4.2 MMOL/L (ref 3.4–5.3)
RBC # BLD AUTO: 4.01 10E12/L (ref 3.8–5.2)
RBC #/AREA URNS AUTO: 0 /HPF (ref 0–2)
SODIUM SERPL-SCNC: 141 MMOL/L (ref 133–144)
SOURCE: ABNORMAL
SP GR UR STRIP: 1.01 (ref 1–1.03)
SPECIMEN SOURCE: NORMAL
SQUAMOUS #/AREA URNS AUTO: 4 /HPF (ref 0–1)
UROBILINOGEN UR STRIP-MCNC: 0 MG/DL (ref 0–2)
WBC # BLD AUTO: 5.9 10E9/L (ref 4–11)
WBC #/AREA URNS AUTO: 0 /HPF (ref 0–5)
WET PREP SPEC: NORMAL

## 2020-08-25 PROCEDURE — 96374 THER/PROPH/DIAG INJ IV PUSH: CPT | Mod: 59 | Performed by: NURSE PRACTITIONER

## 2020-08-25 PROCEDURE — 87591 N.GONORRHOEAE DNA AMP PROB: CPT | Performed by: NURSE PRACTITIONER

## 2020-08-25 PROCEDURE — 99284 EMERGENCY DEPT VISIT MOD MDM: CPT | Mod: Z6 | Performed by: NURSE PRACTITIONER

## 2020-08-25 PROCEDURE — 25000128 H RX IP 250 OP 636: Performed by: NURSE PRACTITIONER

## 2020-08-25 PROCEDURE — 87210 SMEAR WET MOUNT SALINE/INK: CPT | Performed by: NURSE PRACTITIONER

## 2020-08-25 PROCEDURE — 80048 BASIC METABOLIC PNL TOTAL CA: CPT | Performed by: NURSE PRACTITIONER

## 2020-08-25 PROCEDURE — 74177 CT ABD & PELVIS W/CONTRAST: CPT

## 2020-08-25 PROCEDURE — 99285 EMERGENCY DEPT VISIT HI MDM: CPT | Mod: 25 | Performed by: NURSE PRACTITIONER

## 2020-08-25 PROCEDURE — 96376 TX/PRO/DX INJ SAME DRUG ADON: CPT | Performed by: NURSE PRACTITIONER

## 2020-08-25 PROCEDURE — 25000125 ZZHC RX 250: Performed by: NURSE PRACTITIONER

## 2020-08-25 PROCEDURE — 25800030 ZZH RX IP 258 OP 636: Performed by: NURSE PRACTITIONER

## 2020-08-25 PROCEDURE — 87491 CHLMYD TRACH DNA AMP PROBE: CPT | Performed by: NURSE PRACTITIONER

## 2020-08-25 PROCEDURE — 85025 COMPLETE CBC W/AUTO DIFF WBC: CPT | Performed by: NURSE PRACTITIONER

## 2020-08-25 PROCEDURE — 96361 HYDRATE IV INFUSION ADD-ON: CPT | Performed by: NURSE PRACTITIONER

## 2020-08-25 PROCEDURE — 81001 URINALYSIS AUTO W/SCOPE: CPT | Performed by: NURSE PRACTITIONER

## 2020-08-25 RX ORDER — HYDROCODONE BITARTRATE AND ACETAMINOPHEN 5; 325 MG/1; MG/1
1-2 TABLET ORAL EVERY 6 HOURS PRN
Qty: 18 TABLET | Refills: 0 | Status: SHIPPED | OUTPATIENT
Start: 2020-08-25 | End: 2022-02-17

## 2020-08-25 RX ORDER — HYDROMORPHONE HYDROCHLORIDE 1 MG/ML
0.5 INJECTION, SOLUTION INTRAMUSCULAR; INTRAVENOUS; SUBCUTANEOUS
Status: COMPLETED | OUTPATIENT
Start: 2020-08-25 | End: 2020-08-25

## 2020-08-25 RX ORDER — IOPAMIDOL 755 MG/ML
500 INJECTION, SOLUTION INTRAVASCULAR ONCE
Status: COMPLETED | OUTPATIENT
Start: 2020-08-25 | End: 2020-08-25

## 2020-08-25 RX ADMIN — SODIUM CHLORIDE 1000 ML: 9 INJECTION, SOLUTION INTRAVENOUS at 12:44

## 2020-08-25 RX ADMIN — HYDROMORPHONE HYDROCHLORIDE 0.5 MG: 1 INJECTION, SOLUTION INTRAMUSCULAR; INTRAVENOUS; SUBCUTANEOUS at 12:44

## 2020-08-25 RX ADMIN — IOPAMIDOL 80 ML: 755 INJECTION, SOLUTION INTRAVENOUS at 13:45

## 2020-08-25 RX ADMIN — HYDROMORPHONE HYDROCHLORIDE 0.5 MG: 1 INJECTION, SOLUTION INTRAMUSCULAR; INTRAVENOUS; SUBCUTANEOUS at 13:25

## 2020-08-25 RX ADMIN — SODIUM CHLORIDE 60 ML: 9 INJECTION, SOLUTION INTRAVENOUS at 13:45

## 2020-08-25 RX ADMIN — HYDROMORPHONE HYDROCHLORIDE 0.5 MG: 1 INJECTION, SOLUTION INTRAMUSCULAR; INTRAVENOUS; SUBCUTANEOUS at 13:56

## 2020-08-25 NOTE — LETTER
August 25, 2020      To Whom It May Concern:      Lida Blood was seen in our Emergency Department today, 08/25/20.  Please excuse her from work today and tomorrow, she can return on Thursday.    Thank you      Sincerely,        KATE Carey CNP

## 2020-08-25 NOTE — ED AVS SNAPSHOT
[facility] Emergency Department  [address] [place] [address]  Phone:  [phone]  Fax:  [phone]                                    Lida Blood   MRN: [id number]    Department:  [facility] Emergency Department   Date of Visit:  8/25/2020           After Visit Summary Signature Page    I have received my discharge instructions, and my questions have been answered. I have discussed any challenges I see with this plan with the nurse or doctor.    ..........................................................................................................................................  Patient/Patient Representative Signature      ..........................................................................................................................................  Patient Representative Print Name and Relationship to Patient    ..................................................               ................................................  Date                                   Time    ..........................................................................................................................................  Reviewed by Signature/Title    ...................................................              ..............................................  Date                                               Time          22EPIC Rev 08/18

## 2020-08-25 NOTE — ED PROVIDER NOTES
History     Chief Complaint   Patient presents with     Pelvic Pain     HPI  Lida Blood is a 34 year old female who presents to the emergency room with lower mid pelvic pain radiating to her back mostly on the left side for the last 2 days, unrelieved with ibuprofen.  Patient does endorse nausea but denies any vomiting or diarrhea.  Patient does report mild dysuria.  Patient denies any fever.  Patient is status post gastric bypass back in 2015 with a total hysterectomy as well.  Patient denies any vaginal discharge or bleeding.  Nothing makes her symptoms worse.    Telemedicine Visit:  8/13/20    The first issue is a bulge that she feels in her vagina. She feels that since the beginning of the year the bulge has enlarged. She describes the size being larger than a golf ball. It is somewhat long in shape. It now covers the whole opening of the vagina. It feels hard to the touch. She describes some urgency with urination. She has had a small amount of leakage with intercourse. When she does urinate she feels as though she has not been able to completely empty her bladder. She has tried Myrbetriq, oxybutynin, and Flomax without benefit. With bowel movements, she has had to push harder to be able to go. She reports a bowel movement every other day which is normal for her. The stool caliber has become smaller and stringy. It takes her a lot longer to pass a bowel movement. She feels as though she is not able to completely empty her bowels with a bowel movement.    The second issue has been pain primarily with intercourse. She rates the pain a 6 to 7/10. The pain is described as burning, super tight muscles spasming, and crampy. She reports bleeding after intercourse. She notes blood when she wipes and also a little bit in the toilet. She experiences this pain sometimes with a full bladder or with a bowel movement. She has tried heating pads with some benefit. She feels that the heating pads help settle down  the muscles. She has been avoiding intercourse because of the pain.     IMPRESSION/REPORT/PLAN  #1 Vaginal bulge  #2 Pelvic pain with intercourse, full bladder, and passing bowel movements  It was a pleasure meeting Lida Vivar today. We reviewed the protocol in our Endometriosis/Pelvic Pain Clinic, which involves multidisciplinary consults, MRI imaging, review of MRI images in multidisciplinary conference, and conclusion of visits with my esteemed colleagues.    Based on her history, it sounds as though the patient could have prolapse. She also describes muscle spasming which likely could be pelvic floor tension myalgia. We would recommend pre scheduling a consultation with urogynecology. We would recommend appointments with pelvic floor PT and CBT clinics.    No imaging ordered as patient has no uterus, cervix, or ovaries.     Consult conducted via real-time audio/video technology by KATE Freitas, HARVINDER.N.P., D.N.P. and Susana Kingston PA-C in provider workplace to the patient in patient's home.     This Video Visit was performed during the COVID-19 emergency, when many states had issued shelter-in-place orders.     KATE Freitas, HARVINDER.N.P., D.N.P.    This is a Consult P2 (30 mins) visit. 30 minutes were spent with the patient today, and 30 minutes were spent in direct face-to-face counseling and coordination of care.           Allergies:  Allergies   Allergen Reactions     No Known Drug Allergies      Prochlorperazine Other (See Comments)     Caused tachycardia per patient  Caused tachycardia per patient  Caused tachycardia per patient  Caused tachycardia per patient  Caused tachycardia per patient  Caused tachycardia per patient         Problem List:    Patient Active Problem List    Diagnosis Date Noted     Abdominal pain 05/24/2013     Priority: High     S/P gastric bypass 08/10/2017     Priority: Medium     LUQ abdominal pain 08/16/2016     Priority: Medium     Gastric ulcer 12/02/2015     Priority:  Medium     Hypokalemia 12/01/2015     Priority: Medium     Hypoglycemia 12/01/2015     Priority: Medium     Gastroesophageal reflux disease without esophagitis 10/16/2015     Priority: Medium     CARDIOVASCULAR SCREENING; LDL GOAL LESS THAN 160 10/31/2010     Priority: Medium     Major depressive disorder, single episode, moderate (H)      Priority: Medium     Hypothyroidism 09/26/2003     Priority: Medium     Problem list name updated by automated process. Provider to review       Anxiety state 05/08/2002     Priority: Medium     Problem list name updated by automated process. 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)      Generalized anxiety disorder      Iodine hypothyroidism 9/2003     Major depressive disorder, single episode, moderate (H)      Migraine headache 10/14/2011     Obesity (BMI 30-39.9)      Other and unspecified ovarian cyst 03/02/10     Pain pelvic 12/14/04     PONV (postoperative nausea and vomiting)      S/P hysterectomy      Thyrotoxicosis without mention of goiter or other cause, without mention of thyrotoxic crisis or storm 2001     trachelectomy 05/19/09       Past Surgical History:    Past Surgical History:   Procedure Laterality Date     C APPENDECTOMY  4/18/2003     C REMOVAL OF CERVIX  05/29/09    laparoscopic assisted vaginal trachelectomy     C SUPRACERV ABD HYSTERECTOMY  10/06/08     COLONOSCOPY  11/25/14     ESOPHAGOSCOPY, GASTROSCOPY, DUODENOSCOPY (EGD), COMBINED N/A 8/31/2015    Procedure: COMBINED ESOPHAGOSCOPY, GASTROSCOPY, DUODENOSCOPY (EGD), BIOPSY SINGLE OR MULTIPLE;  Surgeon: Toni Wiggins MD;  Location:  GI     ESOPHAGOSCOPY, GASTROSCOPY, DUODENOSCOPY (EGD), COMBINED N/A 8/18/2016    Procedure: COMBINED ESOPHAGOSCOPY, GASTROSCOPY, DUODENOSCOPY (EGD);  Surgeon: Jaswinder Waddell MD;   Location:  GI     ESOPHAGOSCOPY, GASTROSCOPY, DUODENOSCOPY (EGD), COMBINED N/A 3/1/2017    Procedure: COMBINED ESOPHAGOSCOPY, GASTROSCOPY, DUODENOSCOPY (EGD), BIOPSY SINGLE OR MULTIPLE;  Surgeon: Jan Johnston MD;  Location:  GI     ESOPHAGOSCOPY, GASTROSCOPY, DUODENOSCOPY (EGD), DILATATION, COMBINED N/A 4/13/2016    Procedure: COMBINED ESOPHAGOSCOPY, GASTROSCOPY, DUODENOSCOPY (EGD), DILATATION;  Surgeon: Jos Dinero MD;  Location:  GI     EYE SURGERY      bilat orbital decompression surgery      HC COLONOSCOPY W BIOPSY  1/22/2007     HC COLONOSCOPY W/WO BRUSH/WASH  12/16/04     HC CREATE EARDRUM OPENING,GEN ANESTH  12/29/88    Bilateral myringotomy with insertion of PE tubes     HC CREATE EARDRUM OPENING,GEN ANESTH  12/20/90    Bilateral myringotomy with insertion of PE tubes     HC LAP,FULGURATE/EXCISE LESIONS  06/16/08    Laparoscopy, ablation of the endometriosis and diagnostic cystoscopy     HC LAPAROSCOPY, SURGICAL, ABDOMEN, PERITONEUM & OMENTUM; DX W/ OR W/O SPECIMEN(S)  04/18/2003    Diagnostic laparoscopy     HC REMOVAL GALLBLADDER  8/1/03     HC TYMPANOPLASTY W/O MASTOID, INIT/REV W/O OSS CHAIN RECONST  10/03/00    Left exploratory tympanoplasty, myringoplasty, fascia grafting of the oval window, microdissection via the use of operative microscope     HC UGI ENDOSCOPY, SIMPLE EXAM  3/23/12, 11/13/14     HERNIA REPAIR, INCISIONAL  08/23/2006    Laparoscopic mesh repair.     HYSTERECTOMY, PAP NO LONGER INDICATED  5/29/09     HYSTEROSCOPY  02/08/08     LAPAROSCOPIC GASTRECTOMY N/A 3/15/2016    Procedure: LAPAROSCOPIC GASTRECTOMY;  Surgeon: Jos Dinero MD;  Location: [facility] OR     LAPAROSCOPIC LYSIS ADHESIONS  11/02/10    [facility]     LAPAROSCOPIC REVISION GASTROPLASTY TO ELBERT-EN-Y N/A 10/26/2015    Procedure: LAPAROSCOPIC REVISION GASTROPLASTY TO ELBERT-EN-Y;  Surgeon: Toni Wiggins MD;  Location:  OR     LAPAROSCOPIC SALPINGO-OOPHORECTOMY Left 3/2015     LAPAROSCOPY  DIAGNOSTIC (GENERAL) Right 11    Evaluation of abdomen/pelvis, RSO, Cysto; [place]     REPAIR TENDON PERONEAL Right 2018    Procedure: REPAIR TENDON PERONEAL;;  Surgeon: Delgado Martinez DPM;  Location: [facility] OR     STABILIZE TENDON PERONEAL Right 2018    Procedure: STABILIZE TENDON PERONEAL;  Right ankle stabilization with Arthrex internal brace, drill fibula with peroneal tendon re-grooving, peroneal tendon repair;  Surgeon: Delgado Martinez DPM;  Location: [facility] OR       Family History:    Family History   Problem Relation Age of Onset     Breast Cancer Mother         age 42 on chemo and radiation     Gynecology Mother         endometriosis     Diabetes Maternal Grandmother         type 1     Thyroid Disease Maternal Grandmother      C.A.D. Maternal Grandmother      Cardiovascular Maternal Grandmother      Heart Disease Maternal Grandmother      Heart Disease Paternal Grandmother      Diabetes Maternal Grandfather         diabetes     Thyroid Disease Maternal Aunt      Heart Disease Maternal Aunt         great MGA     Gynecology Sister         endometriosis     Anesthesia Reaction No family hx of        Social History:  Marital Status:   [2]  Social History     Tobacco Use     Smoking status: Former Smoker     Packs/day: 0.25     Years: 6.00     Pack years: 1.50     Types: Cigarettes     Last attempt to quit: 2015     Years since quittin.1     Smokeless tobacco: Never Used     Tobacco comment: social   Substance Use Topics     Alcohol use: Yes     Comment: rare     Drug use: No        Medications:    CALCIUM PO  Cholecalciferol (VITAMIN D PO)  escitalopram (LEXAPRO) 20 MG tablet  estradiol (CLIMARA) 0.075 MG/24HR weekly patch  levothyroxine (SYNTHROID/LEVOTHROID) 200 MCG tablet  levothyroxine (SYNTHROID/LEVOTHROID) 75 MCG tablet  pantoprazole (PROTONIX) 40 MG EC tablet  ranitidine (ZANTAC) 300 MG tablet          Review of Systems   All other systems reviewed and are  negative.      Physical Exam   BP: (!) 139/90  Pulse: 78  Temp: 98.7  F (37.1  C)  Resp: 18  Weight: 72.6 kg (160 lb)  SpO2: 98 %      Physical Exam  Constitutional:       Appearance: Normal appearance.      Comments: guarding pelvis, appears uncomfortable   HENT:      Head: Normocephalic.      Mouth/Throat:      Mouth: Mucous membranes are moist.   Eyes:      Extraocular Movements: Extraocular movements intact.   Neck:      Musculoskeletal: Normal range of motion.   Cardiovascular:      Rate and Rhythm: Normal rate.   Pulmonary:      Effort: Pulmonary effort is normal.   Abdominal:      Palpations: Abdomen is soft.      Tenderness: There is abdominal tenderness (mid pelvic pain).   Musculoskeletal: Normal range of motion.   Skin:     General: Skin is warm.      Capillary Refill: Capillary refill takes less than 2 seconds.   Neurological:      General: No focal deficit present.      Mental Status: She is alert.   Psychiatric:         Mood and Affect: Mood normal.         ED Course        Procedures    Results for orders placed or performed during the hospital encounter of 08/25/20 (from the past 24 hour(s))   UA with Microscopic   Result Value Ref Range    Color Urine Yellow     Appearance Urine Clear     Glucose Urine Negative NEG^Negative mg/dL    Bilirubin Urine Negative NEG^Negative    Ketones Urine Negative NEG^Negative mg/dL    Specific Gravity Urine 1.012 1.003 - 1.035    Blood Urine Negative NEG^Negative    pH Urine 7.0 5.0 - 7.0 pH    Protein Albumin Urine Negative NEG^Negative mg/dL    Urobilinogen mg/dL 0.0 0.0 - 2.0 mg/dL    Nitrite Urine Negative NEG^Negative    Leukocyte Esterase Urine Negative NEG^Negative    Source Midstream Urine     WBC Urine 0 0 - 5 /HPF    RBC Urine 0 0 - 2 /HPF    Bacteria Urine Few (A) NEG^Negative /HPF    Squamous Epithelial /HPF Urine 4 (H) 0 - 1 /HPF    Mucous Urine Present (A) NEG^Negative /LPF   CBC with platelets differential   Result Value Ref Range    WBC 5.9 4.0 - 11.0  10e9/L    RBC Count 4.01 3.8 - 5.2 10e12/L    Hemoglobin 12.8 11.7 - 15.7 g/dL    Hematocrit 38.7 35.0 - 47.0 %    MCV 97 78 - 100 fl    MCH 31.9 26.5 - 33.0 pg    MCHC 33.1 31.5 - 36.5 g/dL    RDW 12.9 10.0 - 15.0 %    Platelet Count 278 150 - 450 10e9/L    Diff Method Automated Method     % Neutrophils 65.1 %    % Lymphocytes 22.7 %    % Monocytes 9.6 %    % Eosinophils 1.4 %    % Basophils 0.9 %    % Immature Granulocytes 0.3 %    Nucleated RBCs 0 0 /100    Absolute Neutrophil 3.8 1.6 - 8.3 10e9/L    Absolute Lymphocytes 1.3 0.8 - 5.3 10e9/L    Absolute Monocytes 0.6 0.0 - 1.3 10e9/L    Absolute Basophils 0.1 0.0 - 0.2 10e9/L    Abs Immature Granulocytes 0.0 0 - 0.4 10e9/L    Absolute Nucleated RBC 0.0    Basic metabolic panel   Result Value Ref Range    Sodium 141 133 - 144 mmol/L    Potassium 4.2 3.4 - 5.3 mmol/L    Chloride 108 94 - 109 mmol/L    Carbon Dioxide 29 20 - 32 mmol/L    Anion Gap 4 3 - 14 mmol/L    Glucose 77 70 - 99 mg/dL    Urea Nitrogen 11 7 - 30 mg/dL    Creatinine 0.80 0.52 - 1.04 mg/dL    GFR Estimate >90 >60 mL/min/[1.73_m2]    GFR Estimate If Black >90 >60 mL/min/[1.73_m2]    Calcium 8.7 8.5 - 10.1 mg/dL   Wet prep    Specimen: Vagina   Result Value Ref Range    Specimen Description Vagina     Wet Prep No Trichomonas seen     Wet Prep No clue cells seen     Wet Prep No yeast seen     Wet Prep Few  PMNs seen      CT ABDOMEN PELVIS W CONTRAST    Narrative    CT ABDOMEN/PELVIS WITH CONTRAST August 25, 2020 1:52 PM    HISTORY: Low pelvic pain for two days. History of hysterectomy, right  oophorectomy, hernia repair, cholecystectomy and appendectomy.    TECHNIQUE: Scans obtained from the diaphragm through the pelvis with  IV contrast, 80mL, Isovue-370. Radiation dose for this scan was  reduced using automated exposure control, adjustment of the mA and/or  kV according to patient size, or  iterative reconstruction technique.    COMPARISON: CT abdomen and pelvis dated 9/11/2018.    FINDINGS:  Visualized portions of the lung bases and mediastinal  contents are grossly unremarkable. No aggressive osseous lesions or  acute osseous fractures are seen.    Hernia fixation devices along the ventral aspect of the abdomen with  mesh are consistent with ventral hernia repair. These are stable since  the prior study from 2018. Postop changes of the stomach are noted and  likely represent prior gastric bypass. This history was not given.  Surgical clips in the left upper abdomen in the small bowel are likely  from a Arely-en-Y gastric bypass procedure. No evidence for afferent  loop syndrome is identified.    Patient is status post cholecystectomy. The liver, pancreas, spleen,  bilateral adrenal glands and bilateral kidneys otherwise enhance  normally. No hydronephrosis, nephrolithiasis, hydroureter, or ureteral  calculus is identified. Urinary bladder is grossly unremarkable.    The uterus and right ovary are not seen consistent with prior reported  surgical history. Left ovary is not seen and may also be surgically  absent. This history was not given.    No adenopathy or free air is seen in the peritoneal cavity. Trace free  fluid is seen in the posterior pelvis. Aorta is normal in appearance.    There is mild thickening of the wall of the rectum and distal sigmoid  colon which is likely due to incomplete distention. No pericolonic  inflammatory change to suggest acute diverticulitis. Colon is  otherwise grossly of normal caliber. Appendix is not seen, consistent  with surgical history. Small bowel is grossly of normal caliber. Mild  thickening of the wall the proximal small bowel could represent  mucosal inflammation as can be seen with inflammatory bowel disease  more likely represents a normal brain to patient. Stomach demonstrates  postoperative changes as described above. Otherwise unremarkable.    There is stranding in the adipose tissues of the mid abdomen medial to  the inferior aspect of the liver.  Minimally more prominent than on the  prior study dated 9/11/2018 which could be due to differences in slice  selection. Likely postoperative. Inflammatory process is possible.      Impression    IMPRESSION:  1. Slight stranding/graying of the adipose tissues of the mid anterior  abdomen just medial to the inferior right lobe of the liver and  posterior to the hernia mesh repair is likely due to chronic  postoperative scarring. An inflammatory process is not excluded and  this appears slightly more prominent than on the prior study dated  9/11/2018.  2. Postoperative changes including cholecystectomy, probable gastric  bypass, appendectomy, hysterectomy, right oophorectomy and possible  left oophorectomy. No evidence for afferent loop syndrome is seen.  3. Trace amount free fluid in pelvis is of doubtful clinical  significance.  4. No evidence for bowel obstruction.  5. Mild thickening of the wall of the distal sigmoid colon and of the  rectum is likely due to incomplete distention.  6. Etiology for patient's lower pelvic pain is not otherwise seen.       Medications   0.9% sodium chloride BOLUS (0 mLs Intravenous Stopped 8/25/20 1327)   HYDROmorphone (PF) (DILAUDID) injection 0.5 mg (0.5 mg Intravenous Given 8/25/20 1356)   Saline 100mL Bag (60 mLs Intravenous Given 8/25/20 1345)   iopamidol (ISOVUE-370) solution 500 mL (80 mLs Intravenous Given 8/25/20 1345)   sodium chloride (PF) 0.9% PF flush 3 mL (3 mLs Intravenous Given 8/25/20 1345)       Assessments & Plan (with Medical Decision Making)  Pelvic and vaginal pain.  Patient has had this ongoing intermittently for the last few months starting at the beginning of the year.  Please refer to HPI and focused exam.  Patient here is hemodynamically stable.  Patient had exquisite discomfort with speculum exam, no lesions seen except for a small 0.5 centimeters semi-fluctuant lesion noted to left inner labia.  Patient was not aware of this.  No abnormal discharge noted.   Patient's white count is normal so I am not overly concerned about pelvic inflammatory disease or infectious/bacterial etiology.  Remaining blood work and urinalysis are unremarkable, wet prep is negative.  I did obtain a CT scan of patient's abdomen and pelvis given her surgical history which shows some scar tissue/post operatively.  This may be attributing to her symptoms although I feel this is low likelihood.  Remaining CT scan consistent with postoperative findings.  I discussed test results with patient, she is feeling better here after IV Dilaudid and I feel she is stable to be discharged home.  I did refer her to OB/GYN for further examination and work-up.  For now she will continue with ibuprofen, I did give her a small supply of Norco for severe pain, narcotic safety and side effects were discussed.  GC/chlamydia swabs are pending.  Reasons to return to the emergency room were discussed.  Patient is agreeable to plan of care and discharged in stable condition with a .     I have reviewed the nursing notes.    I have reviewed the findings, diagnosis, plan and need for follow up with the patient.    New Prescriptions    HYDROCODONE-ACETAMINOPHEN (NORCO) 5-325 MG TABLET    Take 1-2 tablets by mouth every 6 hours as needed for pain       Final diagnoses:   Dyspareunia in female   Pelvic pain in female       8/25/2020   [facility] EMERGENCY DEPARTMENT     Buffy Cisneros, KATE CNP  08/25/20 0437

## 2020-08-25 NOTE — ED TRIAGE NOTES
"Presents to ED with pelvic pain and pressure. Initially thought she may be getting a bladder infection but the pressure is very intense. Denies any vaginal bleeding but states, \"there is a bulge down there.\" Denies any fevers or dysuria.  "

## 2020-08-25 NOTE — DISCHARGE INSTRUCTIONS
Ibuprofen, 600 mg every 6 hours as needed for pain.  Norco as needed for severe pain, this is narcotic, do not drive or drink alcohol if you take this, it does continue Tylenol, do not exceed more than 4000 mg of Tylenol in a 24-hour period.  I have referred you to OB/GYN for further work-up regarding this pain that you are having, if you do not hear from our specialty scheduling department in the next couple of days please call them at [phone]

## 2020-08-26 LAB
C TRACH DNA SPEC QL NAA+PROBE: NEGATIVE
N GONORRHOEA DNA SPEC QL NAA+PROBE: NEGATIVE
SPECIMEN SOURCE: NORMAL
SPECIMEN SOURCE: NORMAL

## 2020-10-16 ENCOUNTER — HOSPITAL ENCOUNTER (EMERGENCY)
Facility: CLINIC | Age: 35
Discharge: HOME | End: 2020-10-16
Attending: EMERGENCY MEDICINE | Admitting: EMERGENCY MEDICINE
Payer: COMMERCIAL

## 2020-10-16 VITALS
BODY MASS INDEX: 25.71 KG/M2 | HEART RATE: 78 BPM | HEIGHT: 66 IN | DIASTOLIC BLOOD PRESSURE: 107 MMHG | WEIGHT: 160 LBS | OXYGEN SATURATION: 99 % | SYSTOLIC BLOOD PRESSURE: 142 MMHG | RESPIRATION RATE: 18 BRPM

## 2020-10-16 DIAGNOSIS — S01.01XA LACERATION OF SCALP, INITIAL ENCOUNTER: ICD-10-CM

## 2020-10-16 PROCEDURE — 99282 EMERGENCY DEPT VISIT SF MDM: CPT | Mod: 25 | Performed by: EMERGENCY MEDICINE

## 2020-10-16 PROCEDURE — 90715 TDAP VACCINE 7 YRS/> IM: CPT | Performed by: EMERGENCY MEDICINE

## 2020-10-16 PROCEDURE — 12002 RPR S/N/AX/GEN/TRNK2.6-7.5CM: CPT | Performed by: EMERGENCY MEDICINE

## 2020-10-16 PROCEDURE — 90471 IMMUNIZATION ADMIN: CPT | Performed by: EMERGENCY MEDICINE

## 2020-10-16 PROCEDURE — 250N000011 HC RX IP 250 OP 636: Performed by: EMERGENCY MEDICINE

## 2020-10-16 PROCEDURE — 99283 EMERGENCY DEPT VISIT LOW MDM: CPT | Mod: 25 | Performed by: EMERGENCY MEDICINE

## 2020-10-16 RX ADMIN — CLOSTRIDIUM TETANI TOXOID ANTIGEN (FORMALDEHYDE INACTIVATED), CORYNEBACTERIUM DIPHTHERIAE TOXOID ANTIGEN (FORMALDEHYDE INACTIVATED), BORDETELLA PERTUSSIS TOXOID ANTIGEN (GLUTARALDEHYDE INACTIVATED), BORDETELLA PERTUSSIS FILAMENTOUS HEMAGGLUTININ ANTIGEN (FORMALDEHYDE INACTIVATED), BORDETELLA PERTUSSIS PERTACTIN ANTIGEN, AND BORDETELLA PERTUSSIS FIMBRIAE 2/3 ANTIGEN 0.5 ML: 5; 2; 2.5; 5; 3; 5 INJECTION, SUSPENSION INTRAMUSCULAR at 21:17

## 2020-10-16 ASSESSMENT — MIFFLIN-ST. JEOR: SCORE: 1427.51

## 2020-10-16 NOTE — ED AVS SNAPSHOT
[facility] Emergency Dept  [address] [place] [address]  Phone: [phone]  Fax: [phone]                                    Lida Blood   MRN: [id number]    Department: [facility] Emergency Dept   Date of Visit: 10/16/2020           After Visit Summary Signature Page    I have received my discharge instructions, and my questions have been answered. I have discussed any challenges I see with this plan with the nurse or doctor.    ..........................................................................................................................................  Patient/Patient Representative Signature      ..........................................................................................................................................  Patient Representative Print Name and Relationship to Patient    ..................................................               ................................................  Date                                   Time    ..........................................................................................................................................  Reviewed by Signature/Title    ...................................................              ..............................................  Date                                               Time          22EPIC Rev 08/18

## 2020-10-17 NOTE — ED TRIAGE NOTES
Hit right side of head on the dresser when trying to change clothes.  Has been drinking, denies LOC. Patient's airway, breathing, circulation, and disability/mental status (ABCDs) intact/WDL during triage.

## 2020-10-17 NOTE — ED PROVIDER NOTES
History     Chief Complaint   Patient presents with     Head Laceration     HPI  Lida Blood is a 35 year old female who presents with a scalp laceration on the right temporal/parietal region.  Patient states that she was trying to take off her pants and fell hitting the side of her head on a dresser.  Denies LOC.  Has mild headache in the associated area.  No neck or back pain.  Denies any paresthesias or weakness in the arms or legs.  She is able ambulate without assistance.  Denies any other injury with the incident.  Her tetanus is due next year.  Bled quite briskly but with pressure that has slowed.  Patient admits that she had been drinking throughout most the afternoon evening as it is her birthday.    Allergies:  Allergies   Allergen Reactions     No Known Drug Allergies      Prochlorperazine Other (See Comments)     Caused tachycardia per patient  Caused tachycardia per patient  Caused tachycardia per patient  Caused tachycardia per patient  Caused tachycardia per patient  Caused tachycardia per patient         Problem List:    Patient Active Problem List    Diagnosis Date Noted     Abdominal pain 05/24/2013     Priority: High     S/P gastric bypass 08/10/2017     Priority: Medium     LUQ abdominal pain 08/16/2016     Priority: Medium     Gastric ulcer 12/02/2015     Priority: Medium     Hypokalemia 12/01/2015     Priority: Medium     Hypoglycemia 12/01/2015     Priority: Medium     Gastroesophageal reflux disease without esophagitis 10/16/2015     Priority: Medium     CARDIOVASCULAR SCREENING; LDL GOAL LESS THAN 160 10/31/2010     Priority: Medium     Major depressive disorder, single episode, moderate (H)      Priority: Medium     Hypothyroidism 09/26/2003     Priority: Medium     Problem list name updated by automated process. Provider to review       Anxiety state 05/08/2002     Priority: Medium     Problem list name updated by automated process. 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     Major depressive disorder, single episode, moderate (H)      Migraine headache 10/14/2011     Obesity (BMI 30-39.9)      Other and unspecified ovarian cyst 03/02/10     Pain pelvic 12/14/04     PONV (postoperative nausea and vomiting)      S/P hysterectomy      Thyrotoxicosis without mention of goiter or other cause, without mention of thyrotoxic crisis or storm 2001     trachelectomy 05/19/09       Past Surgical History:    Past Surgical History:   Procedure Laterality Date     C APPENDECTOMY  4/18/2003     C REMOVAL OF CERVIX  05/29/09    laparoscopic assisted vaginal trachelectomy     C SUPRACERV ABD HYSTERECTOMY  10/06/08     COLONOSCOPY  11/25/14     ESOPHAGOSCOPY, GASTROSCOPY, DUODENOSCOPY (EGD), COMBINED N/A 8/31/2015    Procedure: COMBINED ESOPHAGOSCOPY, GASTROSCOPY, DUODENOSCOPY (EGD), BIOPSY SINGLE OR MULTIPLE;  Surgeon: Toni Wiggins MD;  Location:  GI     ESOPHAGOSCOPY, GASTROSCOPY, DUODENOSCOPY (EGD), COMBINED N/A 8/18/2016    Procedure: COMBINED ESOPHAGOSCOPY, GASTROSCOPY, DUODENOSCOPY (EGD);  Surgeon: Jaswinder Waddell MD;  Location: [facility]     ESOPHAGOSCOPY, GASTROSCOPY, DUODENOSCOPY (EGD), COMBINED N/A 3/1/2017    Procedure: COMBINED ESOPHAGOSCOPY, GASTROSCOPY, DUODENOSCOPY (EGD), BIOPSY SINGLE OR MULTIPLE;  Surgeon: Jan Johnston MD;  Location:  GI     ESOPHAGOSCOPY, GASTROSCOPY, DUODENOSCOPY (EGD), DILATATION, COMBINED N/A 4/13/2016    Procedure: COMBINED ESOPHAGOSCOPY, GASTROSCOPY, DUODENOSCOPY (EGD), DILATATION;  Surgeon: Jos Dinero MD;  Location:  GI     EYE SURGERY      bilat orbital decompression surgery      HC COLONOSCOPY W BIOPSY  1/22/2007     HC COLONOSCOPY W/WO BRUSH/WASH  12/16/04     HC CREATE EARDRUM  OPENING,GEN ANESTH  12/29/88    Bilateral myringotomy with insertion of PE tubes     HC CREATE EARDRUM OPENING,GEN ANESTH  12/20/90    Bilateral myringotomy with insertion of PE tubes      LAP,FULGURATE/EXCISE LESIONS  06/16/08    Laparoscopy, ablation of the endometriosis and diagnostic cystoscopy      LAPAROSCOPY, SURGICAL, ABDOMEN, PERITONEUM & OMENTUM; DX W/ OR W/O SPECIMEN(S)  04/18/2003    Diagnostic laparoscopy     HC REMOVAL GALLBLADDER  8/1/03     HC TYMPANOPLASTY W/O MASTOID, INIT/REV W/O OSS CHAIN RECONST  10/03/00    Left exploratory tympanoplasty, myringoplasty, fascia grafting of the oval window, microdissection via the use of operative microscope      [facility] ENDOSCOPY, SIMPLE EXAM  3/23/12, 11/13/14     HERNIA REPAIR, INCISIONAL  08/23/2006    Laparoscopic mesh repair.     HYSTERECTOMY, PAP NO LONGER INDICATED  5/29/09     HYSTEROSCOPY  02/08/08     LAPAROSCOPIC GASTRECTOMY N/A 3/15/2016    Procedure: LAPAROSCOPIC GASTRECTOMY;  Surgeon: Jos Dinero MD;  Location: [facility] OR     LAPAROSCOPIC LYSIS ADHESIONS  11/02/10    [facility]     LAPAROSCOPIC REVISION GASTROPLASTY TO ELBERT-EN-Y N/A 10/26/2015    Procedure: LAPAROSCOPIC REVISION GASTROPLASTY TO ELBERT-EN-Y;  Surgeon: Toni Wiggins MD;  Location: [facility]     LAPAROSCOPIC SALPINGO-OOPHORECTOMY Left 3/2015     LAPAROSCOPY DIAGNOSTIC (GENERAL) Right 06/23/11    Evaluation of abdomen/pelvis, RSO, Cysto; [facility]     REPAIR TENDON PERONEAL Right 4/6/2018    Procedure: REPAIR TENDON PERONEAL;;  Surgeon: Delgado Martinez DPM;  Location: [facility] OR     STABILIZE TENDON PERONEAL Right 4/6/2018    Procedure: STABILIZE TENDON PERONEAL;  Right ankle stabilization with Arthrex internal brace, drill fibula with peroneal tendon re-grooving, peroneal tendon repair;  Surgeon: Delgado Martinez DPM;  Location: [facility] OR       Family History:    Family History   Problem Relation Age of Onset     Breast Cancer Mother         age 42 on chemo and  "radiation     Gynecology Mother         endometriosis     Diabetes Maternal Grandmother         type 1     Thyroid Disease Maternal Grandmother      C.A.D. Maternal Grandmother      Cardiovascular Maternal Grandmother      Heart Disease Maternal Grandmother      Heart Disease Paternal Grandmother      Diabetes Maternal Grandfather         diabetes     Thyroid Disease Maternal Aunt      Heart Disease Maternal Aunt         great MGA     Gynecology Sister         endometriosis     Anesthesia Reaction No family hx of        Social History:  Marital Status:   [2]  Social History     Tobacco Use     Smoking status: Former Smoker     Packs/day: 0.25     Years: 6.00     Pack years: 1.50     Types: Cigarettes     Quit date: 2015     Years since quittin.3     Smokeless tobacco: Never Used     Tobacco comment: social   Substance Use Topics     Alcohol use: Yes     Comment: rare     Drug use: No        Medications:         CALCIUM PO       Cholecalciferol (VITAMIN D PO)       escitalopram (LEXAPRO) 20 MG tablet       estradiol (CLIMARA) 0.075 MG/24HR weekly patch       HYDROcodone-acetaminophen (NORCO) 5-325 MG tablet       levothyroxine (SYNTHROID/LEVOTHROID) 200 MCG tablet       levothyroxine (SYNTHROID/LEVOTHROID) 75 MCG tablet       pantoprazole (PROTONIX) 40 MG EC tablet       ranitidine (ZANTAC) 300 MG tablet          Review of Systems systems are reviewed and are negative.    Physical Exam   BP: (!) 142/107  Pulse: 94  Resp: 16  Height: 167.6 cm (5' 6\")  Weight: 72.6 kg (160 lb)  SpO2: 94 %      Physical Exam alert cooperative female in mild to moderate stress.  HEENT reveals a complex laceration on right parietal scalp.  This is about 6 cm in total length.  Some oozing of bright red blood.  No crepitus or bony step-off is felt on palpation.  Ears reveal no hemotympanum or bonilla signs.  She does have some scleral tympanum.  Eyes show equal reactive pupils.  Extraocular motions are intact.  No nasal " injury or epistasis.  No raccoons eyes.  No dental trauma malocclusion.  Neck is supple.  Neurologically grossly nonfocal.             ED Course        Procedures        Lidocaine with epinephrine was instilled for field block anesthetic.  Wound was irrigated with normal saline.  The hair around the laceration was trimmed.  The wound was then closed with eight 4-0 Ethilon sutures in interrupted fashion.  Antibiotic and dressing were applied.       Critical Care time:  none               No results found for this or any previous visit (from the past 24 hour(s)).    Medications   Tdap (tetanus-diphtheria-acell pertussis) (ADACEL) injection 0.5 mL (0.5 mLs Intramuscular Given 10/16/20 2117)       Assessments & Plan (with Medical Decision Making)   Lida Blood is a 35 year old female who presents with a scalp laceration on the right temporal/parietal region.  Patient states that she was trying to take off her pants and fell hitting the side of her head on a dresser.  Denies LOC.  Has mild headache in the associated area.  No neck or back pain.  Denies any paresthesias or weakness in the arms or legs.  She is able ambulate without assistance.  Denies any other injury with the incident.  Her tetanus is due next year.  Bled quite briskly but with pressure that has slowed.  Patient admits that she had been drinking throughout most the afternoon evening as it is her birthday.  On exam patient had a complex laceration described and repaired above.  No evidence intracranial bleed.  Neurologically nonfocal.  Tetanus is updated.  Information on laceration care is provided.  His daughter is present to drive her mother home.  I have reviewed the nursing notes.    I have reviewed the findings, diagnosis, plan and need for follow up with the patient.       New Prescriptions    No medications on file       Final diagnoses:   Laceration of scalp, initial encounter       10/16/2020   [facility] EMERGENCY  [facility]     Urbano Zheng MD  10/16/20 [id number]

## 2021-01-09 ENCOUNTER — HEALTH MAINTENANCE LETTER (OUTPATIENT)
Age: 36
End: 2021-01-09

## 2021-10-23 ENCOUNTER — HEALTH MAINTENANCE LETTER (OUTPATIENT)
Age: 36
End: 2021-10-23

## 2022-02-12 ENCOUNTER — HEALTH MAINTENANCE LETTER (OUTPATIENT)
Age: 37
End: 2022-02-12

## 2022-02-17 ENCOUNTER — HOSPITAL ENCOUNTER (EMERGENCY)
Facility: CLINIC | Age: 37
Discharge: HOME | End: 2022-02-17
Attending: PHYSICIAN ASSISTANT | Admitting: PHYSICIAN ASSISTANT
Payer: COMMERCIAL

## 2022-02-17 ENCOUNTER — APPOINTMENT (OUTPATIENT)
Dept: CT IMAGING | Facility: CLINIC | Age: 37
End: 2022-02-17
Attending: PHYSICIAN ASSISTANT
Payer: COMMERCIAL

## 2022-02-17 VITALS
HEART RATE: 71 BPM | RESPIRATION RATE: 20 BRPM | TEMPERATURE: 98.1 F | OXYGEN SATURATION: 98 % | SYSTOLIC BLOOD PRESSURE: 120 MMHG | WEIGHT: 154.38 LBS | BODY MASS INDEX: 24.92 KG/M2 | DIASTOLIC BLOOD PRESSURE: 80 MMHG

## 2022-02-17 DIAGNOSIS — R10.9 CENTRAL ABDOMINAL PAIN: ICD-10-CM

## 2022-02-17 LAB
ALBUMIN SERPL-MCNC: 3.8 G/DL (ref 3.4–5)
ALP SERPL-CCNC: 42 U/L (ref 40–150)
ALT SERPL W P-5'-P-CCNC: 15 U/L (ref 0–50)
ANION GAP SERPL CALCULATED.3IONS-SCNC: 3 MMOL/L (ref 3–14)
AST SERPL W P-5'-P-CCNC: 12 U/L (ref 0–45)
BASOPHILS # BLD AUTO: 0.1 10E3/UL (ref 0–0.2)
BASOPHILS NFR BLD AUTO: 1 %
BILIRUB SERPL-MCNC: 0.2 MG/DL (ref 0.2–1.3)
BUN SERPL-MCNC: 16 MG/DL (ref 7–30)
CALCIUM SERPL-MCNC: 8.6 MG/DL (ref 8.5–10.1)
CHLORIDE BLD-SCNC: 109 MMOL/L (ref 94–109)
CO2 SERPL-SCNC: 28 MMOL/L (ref 20–32)
CREAT SERPL-MCNC: 0.65 MG/DL (ref 0.52–1.04)
CRP SERPL-MCNC: <2.9 MG/L (ref 0–8)
EOSINOPHIL # BLD AUTO: 0 10E3/UL (ref 0–0.7)
EOSINOPHIL NFR BLD AUTO: 1 %
ERYTHROCYTE [DISTWIDTH] IN BLOOD BY AUTOMATED COUNT: 12.2 % (ref 10–15)
GFR SERPL CREATININE-BSD FRML MDRD: >90 ML/MIN/1.73M2
GLUCOSE BLD-MCNC: 92 MG/DL (ref 70–99)
HCT VFR BLD AUTO: 34.8 % (ref 35–47)
HGB BLD-MCNC: 11.7 G/DL (ref 11.7–15.7)
IMM GRANULOCYTES # BLD: 0 10E3/UL
IMM GRANULOCYTES NFR BLD: 0 %
LIPASE SERPL-CCNC: 175 U/L (ref 73–393)
LYMPHOCYTES # BLD AUTO: 1.1 10E3/UL (ref 0.8–5.3)
LYMPHOCYTES NFR BLD AUTO: 30 %
MCH RBC QN AUTO: 35.1 PG (ref 26.5–33)
MCHC RBC AUTO-ENTMCNC: 33.6 G/DL (ref 31.5–36.5)
MCV RBC AUTO: 105 FL (ref 78–100)
MONOCYTES # BLD AUTO: 0.3 10E3/UL (ref 0–1.3)
MONOCYTES NFR BLD AUTO: 8 %
NEUTROPHILS # BLD AUTO: 2.1 10E3/UL (ref 1.6–8.3)
NEUTROPHILS NFR BLD AUTO: 60 %
NRBC # BLD AUTO: 0 10E3/UL
NRBC BLD AUTO-RTO: 0 /100
PLATELET # BLD AUTO: 295 10E3/UL (ref 150–450)
POTASSIUM BLD-SCNC: 4.3 MMOL/L (ref 3.4–5.3)
PROT SERPL-MCNC: 7.5 G/DL (ref 6.8–8.8)
RBC # BLD AUTO: 3.33 10E6/UL (ref 3.8–5.2)
SODIUM SERPL-SCNC: 140 MMOL/L (ref 133–144)
WBC # BLD AUTO: 3.6 10E3/UL (ref 4–11)

## 2022-02-17 PROCEDURE — 250N000009 HC RX 250: Performed by: PHYSICIAN ASSISTANT

## 2022-02-17 PROCEDURE — 99285 EMERGENCY DEPT VISIT HI MDM: CPT | Performed by: PHYSICIAN ASSISTANT

## 2022-02-17 PROCEDURE — 99285 EMERGENCY DEPT VISIT HI MDM: CPT | Mod: 25 | Performed by: PHYSICIAN ASSISTANT

## 2022-02-17 PROCEDURE — 82040 ASSAY OF SERUM ALBUMIN: CPT | Performed by: PHYSICIAN ASSISTANT

## 2022-02-17 PROCEDURE — 96374 THER/PROPH/DIAG INJ IV PUSH: CPT | Mod: 59 | Performed by: PHYSICIAN ASSISTANT

## 2022-02-17 PROCEDURE — 80053 COMPREHEN METABOLIC PANEL: CPT | Performed by: PHYSICIAN ASSISTANT

## 2022-02-17 PROCEDURE — 250N000013 HC RX MED GY IP 250 OP 250 PS 637: Performed by: PHYSICIAN ASSISTANT

## 2022-02-17 PROCEDURE — 83690 ASSAY OF LIPASE: CPT | Performed by: PHYSICIAN ASSISTANT

## 2022-02-17 PROCEDURE — 250N000011 HC RX IP 250 OP 636: Performed by: PHYSICIAN ASSISTANT

## 2022-02-17 PROCEDURE — 96375 TX/PRO/DX INJ NEW DRUG ADDON: CPT | Performed by: PHYSICIAN ASSISTANT

## 2022-02-17 PROCEDURE — 36415 COLL VENOUS BLD VENIPUNCTURE: CPT | Performed by: PHYSICIAN ASSISTANT

## 2022-02-17 PROCEDURE — 85025 COMPLETE CBC W/AUTO DIFF WBC: CPT | Performed by: PHYSICIAN ASSISTANT

## 2022-02-17 PROCEDURE — 86140 C-REACTIVE PROTEIN: CPT | Performed by: PHYSICIAN ASSISTANT

## 2022-02-17 PROCEDURE — 74177 CT ABD & PELVIS W/CONTRAST: CPT

## 2022-02-17 PROCEDURE — 96361 HYDRATE IV INFUSION ADD-ON: CPT | Performed by: PHYSICIAN ASSISTANT

## 2022-02-17 PROCEDURE — 258N000003 HC RX IP 258 OP 636: Performed by: PHYSICIAN ASSISTANT

## 2022-02-17 RX ORDER — SUCRALFATE 1 G/1
1 TABLET ORAL 4 TIMES DAILY
Qty: 120 TABLET | Refills: 0 | Status: SHIPPED | OUTPATIENT
Start: 2022-02-17 | End: 2022-03-19

## 2022-02-17 RX ORDER — ONDANSETRON 2 MG/ML
4 INJECTION INTRAMUSCULAR; INTRAVENOUS EVERY 30 MIN PRN
Status: DISCONTINUED | OUTPATIENT
Start: 2022-02-17 | End: 2022-02-17 | Stop reason: HOSPADM

## 2022-02-17 RX ORDER — SODIUM CHLORIDE 9 MG/ML
INJECTION, SOLUTION INTRAVENOUS CONTINUOUS
Status: DISCONTINUED | OUTPATIENT
Start: 2022-02-17 | End: 2022-02-17 | Stop reason: HOSPADM

## 2022-02-17 RX ORDER — HYDROCODONE BITARTRATE AND ACETAMINOPHEN 5; 325 MG/1; MG/1
1 TABLET ORAL EVERY 6 HOURS PRN
Qty: 10 TABLET | Refills: 0 | Status: SHIPPED | OUTPATIENT
Start: 2022-02-17 | End: 2022-02-20

## 2022-02-17 RX ORDER — HYDROMORPHONE HYDROCHLORIDE 1 MG/ML
0.5 INJECTION, SOLUTION INTRAMUSCULAR; INTRAVENOUS; SUBCUTANEOUS
Status: DISCONTINUED | OUTPATIENT
Start: 2022-02-17 | End: 2022-02-17 | Stop reason: HOSPADM

## 2022-02-17 RX ORDER — IOPAMIDOL 755 MG/ML
200 INJECTION, SOLUTION INTRAVASCULAR ONCE
Status: COMPLETED | OUTPATIENT
Start: 2022-02-17 | End: 2022-02-17

## 2022-02-17 RX ORDER — IBUPROFEN 600 MG/1
600 TABLET, FILM COATED ORAL EVERY 6 HOURS PRN
COMMUNITY

## 2022-02-17 RX ADMIN — HYDROMORPHONE HYDROCHLORIDE 1 MG: 1 INJECTION, SOLUTION INTRAMUSCULAR; INTRAVENOUS; SUBCUTANEOUS at 13:22

## 2022-02-17 RX ADMIN — SODIUM CHLORIDE 1000 ML: 9 INJECTION, SOLUTION INTRAVENOUS at 12:24

## 2022-02-17 RX ADMIN — LIDOCAINE HYDROCHLORIDE 30 ML: 20 SOLUTION ORAL; TOPICAL at 12:25

## 2022-02-17 RX ADMIN — ONDANSETRON 4 MG: 2 INJECTION INTRAMUSCULAR; INTRAVENOUS at 12:24

## 2022-02-17 RX ADMIN — HYDROMORPHONE HYDROCHLORIDE 0.5 MG: 1 INJECTION, SOLUTION INTRAMUSCULAR; INTRAVENOUS; SUBCUTANEOUS at 13:54

## 2022-02-17 RX ADMIN — SODIUM CHLORIDE 60 ML: 9 INJECTION, SOLUTION INTRAVENOUS at 13:11

## 2022-02-17 RX ADMIN — IOPAMIDOL 76 ML: 755 INJECTION, SOLUTION INTRAVENOUS at 13:11

## 2022-02-17 NOTE — ED PROVIDER NOTES
History     Chief Complaint   Patient presents with     Abdominal Pain       HPI  Lida Blood is a 36 year old female with a history of a Arely-en-Y gastric bypass, cholecystectomy, appendectomy, hysterectomy, who presents to the emergency department complaining of abdominal pain. Patient reports the last couple days she has had a decreased appetite and a little bit of nausea.  Today when she woke up she had sharp central abdominal pain she describes as occasionally crampy but then sharp.  She localizes it to right around her bellybutton.  She states it feels like it is shooting into her back.  He took ibuprofen a couple times without relief.  She has not had any vomiting but continues to have nausea.  She had a bowel movement last night that was normal but none today.  Denies passing gas today.  No fevers.  No urinary symptoms.  She has never had pain like this before.        Allergies:  Allergies   Allergen Reactions     No Known Drug Allergies      Prochlorperazine Other (See Comments)     Caused tachycardia per patient  Caused tachycardia per patient  Caused tachycardia per patient  Caused tachycardia per patient  Caused tachycardia per patient  Caused tachycardia per patient         Problem List:    Patient Active Problem List    Diagnosis Date Noted     Abdominal pain 05/24/2013     Priority: High     S/P gastric bypass 08/10/2017     Priority: Medium     LUQ abdominal pain 08/16/2016     Priority: Medium     Gastric ulcer 12/02/2015     Priority: Medium     Hypokalemia 12/01/2015     Priority: Medium     Hypoglycemia 12/01/2015     Priority: Medium     Gastroesophageal reflux disease without esophagitis 10/16/2015     Priority: Medium     CARDIOVASCULAR SCREENING; LDL GOAL LESS THAN 160 10/31/2010     Priority: Medium     Major depressive disorder, single episode, moderate (H)      Priority: Medium     Hypothyroidism 09/26/2003     Priority: Medium     Problem list name updated by automated  process. Provider to review       Anxiety state 05/08/2002     Priority: Medium     Problem list name updated by automated process. 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)      Generalized anxiety disorder      Iodine hypothyroidism 9/2003     Major depressive disorder, single episode, moderate (H)      Migraine headache 10/14/2011     Obesity (BMI 30-39.9)      Other and unspecified ovarian cyst 03/02/10     Pain pelvic 12/14/04     PONV (postoperative nausea and vomiting)      S/P hysterectomy      Thyrotoxicosis without mention of goiter or other cause, without mention of thyrotoxic crisis or storm 2001     trachelectomy 05/19/09       Past Surgical History:    Past Surgical History:   Procedure Laterality Date     COLONOSCOPY  11/25/14     ESOPHAGOSCOPY, GASTROSCOPY, DUODENOSCOPY (EGD), COMBINED N/A 8/31/2015    Procedure: COMBINED ESOPHAGOSCOPY, GASTROSCOPY, DUODENOSCOPY (EGD), BIOPSY SINGLE OR MULTIPLE;  Surgeon: Toni Wiggins MD;  Location:  GI     ESOPHAGOSCOPY, GASTROSCOPY, DUODENOSCOPY (EGD), COMBINED N/A 8/18/2016    Procedure: COMBINED ESOPHAGOSCOPY, GASTROSCOPY, DUODENOSCOPY (EGD);  Surgeon: Jaswinder Waddell MD;  Location: [facility]     ESOPHAGOSCOPY, GASTROSCOPY, DUODENOSCOPY (EGD), COMBINED N/A 3/1/2017    Procedure: COMBINED ESOPHAGOSCOPY, GASTROSCOPY, DUODENOSCOPY (EGD), BIOPSY SINGLE OR MULTIPLE;  Surgeon: Jan Johnston MD;  Location:  GI     ESOPHAGOSCOPY, GASTROSCOPY, DUODENOSCOPY (EGD), DILATATION, COMBINED N/A 4/13/2016    Procedure: COMBINED ESOPHAGOSCOPY, GASTROSCOPY, DUODENOSCOPY (EGD), DILATATION;  Surgeon: Jos Dinero MD;  Location:  GI     EYE SURGERY      bilat orbital decompression surgery      HC LAP,FULGURATE/EXCISE LESIONS  06/16/08    Laparoscopy, ablation of the endometriosis  and diagnostic cystoscopy     HC LAPAROSCOPY, SURGICAL, ABDOMEN, PERITONEUM & OMENTUM; DX W/ OR W/O SPECIMEN(S)  04/18/2003    Diagnostic laparoscopy     HC REMOVAL GALLBLADDER  8/1/03     HC TYMPANOPLASTY W/O MASTOID, INIT/REV W/O OSS CHAIN RECONST  10/03/00    Left exploratory tympanoplasty, myringoplasty, fascia grafting of the oval window, microdissection via the use of operative microscope     HERNIA REPAIR, INCISIONAL  08/23/2006    Laparoscopic mesh repair.     HYSTERECTOMY, PAP NO LONGER INDICATED  5/29/09     HYSTEROSCOPY  02/08/08     LAPAROSCOPIC GASTRECTOMY N/A 3/15/2016    Procedure: LAPAROSCOPIC GASTRECTOMY;  Surgeon: Jos Dinero MD;  Location: [facility] OR     LAPAROSCOPIC LYSIS ADHESIONS  11/02/10    [facility]     LAPAROSCOPIC REVISION GASTROPLASTY TO ELBERT-EN-Y N/A 10/26/2015    Procedure: LAPAROSCOPIC REVISION GASTROPLASTY TO ELBERT-EN-Y;  Surgeon: Toni Wiggins MD;  Location: [facility] OR     LAPAROSCOPIC SALPINGO-OOPHORECTOMY Left 3/2015     LAPAROSCOPY DIAGNOSTIC (GENERAL) Right 06/23/11    Evaluation of abdomen/pelvis, RSO, Cysto; [facility]     REPAIR TENDON PERONEAL Right 4/6/2018    Procedure: REPAIR TENDON PERONEAL;;  Surgeon: Delgado Martinez DPM;  Location: [facility] OR     STABILIZE TENDON PERONEAL Right 4/6/2018    Procedure: STABILIZE TENDON PERONEAL;  Right ankle stabilization with Arthrex internal brace, drill fibula with peroneal tendon re-grooving, peroneal tendon repair;  Surgeon: Delgado Martinez DPM;  Location: [facility] OR     ZZ APPENDECTOMY  4/18/2003     ZZC REMOVAL OF CERVIX  05/29/09    laparoscopic assisted vaginal trachelectomy     ZZC SUPRACERV ABD HYSTERECTOMY  10/06/08     ZZHC COLONOSCOPY W BIOPSY  1/22/2007     ZZHC COLONOSCOPY W/WO BRUSH/WASH  12/16/04     ZZHC CREATE EARDRUM OPENING,GEN ANESTH  12/29/88    Bilateral myringotomy with insertion of PE tubes     ZZHC CREATE EARDRUM OPENING,GEN ANESTH  12/20/90    Bilateral myringotomy with insertion of PE  tubes     ZZHC UGI ENDOSCOPY, SIMPLE EXAM  3/23/12, 14       Family History:    Family History   Problem Relation Age of Onset     Breast Cancer Mother         age 42 on chemo and radiation     Gynecology Mother         endometriosis     Diabetes Maternal Grandmother         type 1     Thyroid Disease Maternal Grandmother      C.A.D. Maternal Grandmother      Cardiovascular Maternal Grandmother      Heart Disease Maternal Grandmother      Heart Disease Paternal Grandmother      Diabetes Maternal Grandfather         diabetes     Thyroid Disease Maternal Aunt      Heart Disease Maternal Aunt         great MGA     Gynecology Sister         endometriosis     Anesthesia Reaction No family hx of        Social History:  Marital Status:   [2]  Social History     Tobacco Use     Smoking status: Former Smoker     Packs/day: 0.25     Years: 6.00     Pack years: 1.50     Types: Cigarettes     Quit date: 2015     Years since quittin.6     Smokeless tobacco: Never Used     Tobacco comment: social   Substance Use Topics     Alcohol use: Yes     Comment: rare     Drug use: No        Medications:    HYDROcodone-acetaminophen (NORCO) 5-325 MG tablet  ibuprofen (ADVIL/MOTRIN) 600 MG tablet  sucralfate (CARAFATE) 1 GM tablet  CALCIUM PO  Cholecalciferol (VITAMIN D PO)  escitalopram (LEXAPRO) 20 MG tablet  estradiol (CLIMARA) 0.075 MG/24HR weekly patch  levothyroxine (SYNTHROID/LEVOTHROID) 200 MCG tablet  levothyroxine (SYNTHROID/LEVOTHROID) 75 MCG tablet  pantoprazole (PROTONIX) 40 MG EC tablet  ranitidine (ZANTAC) 300 MG tablet          Review of Systems   All other systems reviewed and are negative.      Physical Exam   BP: (!) 135/90  Pulse: 90  Temp: 98.1  F (36.7  C)  Resp: 20  Weight: 70 kg (154 lb 6 oz)  SpO2: 99 %      Physical Exam  Vitals and nursing note reviewed.   Constitutional:       General: She is not in acute distress.     Appearance: She is well-developed. She is not ill-appearing,  toxic-appearing or diaphoretic.   HENT:      Head: Normocephalic and atraumatic.   Cardiovascular:      Rate and Rhythm: Normal rate and regular rhythm.      Heart sounds: Normal heart sounds.   Pulmonary:      Effort: Pulmonary effort is normal. No respiratory distress.      Breath sounds: Normal breath sounds.   Abdominal:      General: Abdomen is flat. Bowel sounds are decreased.      Palpations: Abdomen is soft.      Tenderness: There is abdominal tenderness in the epigastric area, periumbilical area and suprapubic area.   Musculoskeletal:         General: No deformity.   Skin:     General: Skin is warm and dry.   Neurological:      General: No focal deficit present.      Mental Status: She is alert and oriented to person, place, and time. Mental status is at baseline.   Psychiatric:         Mood and Affect: Mood normal.         Behavior: Behavior normal.         ED Course          Procedures      Results for orders placed or performed during the hospital encounter of 02/17/22 (from the past 24 hour(s))   CBC with platelets differential    Narrative    The following orders were created for panel order CBC with platelets differential.  Procedure                               Abnormality         Status                     ---------                               -----------         ------                     CBC with platelets and d...[[id number]]  Abnormal            Final result                 Please view results for these tests on the individual orders.   Comprehensive metabolic panel   Result Value Ref Range    Sodium 140 133 - 144 mmol/L    Potassium 4.3 3.4 - 5.3 mmol/L    Chloride 109 94 - 109 mmol/L    Carbon Dioxide (CO2) 28 20 - 32 mmol/L    Anion Gap 3 3 - 14 mmol/L    Urea Nitrogen 16 7 - 30 mg/dL    Creatinine 0.65 0.52 - 1.04 mg/dL    Calcium 8.6 8.5 - 10.1 mg/dL    Glucose 92 70 - 99 mg/dL    Alkaline Phosphatase 42 40 - 150 U/L    AST 12 0 - 45 U/L    ALT 15 0 - 50 U/L    Protein Total 7.5 6.8 - 8.8  g/dL    Albumin 3.8 3.4 - 5.0 g/dL    Bilirubin Total 0.2 0.2 - 1.3 mg/dL    GFR Estimate >90 >60 mL/min/1.73m2   Lipase   Result Value Ref Range    Lipase 175 73 - 393 U/L   CRP inflammation   Result Value Ref Range    CRP Inflammation <2.9 0.0 - 8.0 mg/L   CBC with platelets and differential   Result Value Ref Range    WBC Count 3.6 (L) 4.0 - 11.0 10e3/uL    RBC Count 3.33 (L) 3.80 - 5.20 10e6/uL    Hemoglobin 11.7 11.7 - 15.7 g/dL    Hematocrit 34.8 (L) 35.0 - 47.0 %     (H) 78 - 100 fL    MCH 35.1 (H) 26.5 - 33.0 pg    MCHC 33.6 31.5 - 36.5 g/dL    RDW 12.2 10.0 - 15.0 %    Platelet Count 295 150 - 450 10e3/uL    % Neutrophils 60 %    % Lymphocytes 30 %    % Monocytes 8 %    % Eosinophils 1 %    % Basophils 1 %    % Immature Granulocytes 0 %    NRBCs per 100 WBC 0 <1 /100    Absolute Neutrophils 2.1 1.6 - 8.3 10e3/uL    Absolute Lymphocytes 1.1 0.8 - 5.3 10e3/uL    Absolute Monocytes 0.3 0.0 - 1.3 10e3/uL    Absolute Eosinophils 0.0 0.0 - 0.7 10e3/uL    Absolute Basophils 0.1 0.0 - 0.2 10e3/uL    Absolute Immature Granulocytes 0.0 <=0.4 10e3/uL    Absolute NRBCs 0.0 10e3/uL   CT Abdomen Pelvis w Contrast    Narrative    CT ABDOMEN PELVIS W CONTRAST 2/17/2022 1:20 PM    CLINICAL HISTORY: Central abdominal pain.   TECHNIQUE: CT scan of the abdomen and pelvis was performed following  injection of IV contrast. Multiplanar reformats were obtained. Dose  reduction techniques were used.  CONTRAST: 76mL, Isovue 370    COMPARISON: CT abdomen and pelvis 8/25/2020.    FINDINGS:   LOWER CHEST: Normal.    HEPATOBILIARY: Unremarkable liver. Cholecystectomy. No biliary  dilatation.    PANCREAS: Normal.    SPLEEN: Normal.    ADRENAL GLANDS: Normal.    KIDNEYS/BLADDER: Normal.    BOWEL: Gastric bypass. No bowel obstruction or inflammation.    PELVIC ORGANS: Trace free fluid. Hysterectomy.    ADDITIONAL FINDINGS: No lymphadenopathy. Right upper anterior abdomen  fat stranding just underneath the ventral abdominal wall  mesh  decreased from prior and is consistent with chronic postoperative  scarring.    MUSCULOSKELETAL: Ventral abdominal wall hernia repair with mesh.      Impression    IMPRESSION:   1.  Etiology for central abdominal pain is not identified.  2.  Trace free fluid in the pelvis is likely physiologic.  3.  Gastric bypass, cholecystectomy, hysterectomy and anterior  abdominal wall hernia repair.    MOHSEN DOMINGUEZ MD         SYSTEM ID:  [id number]       Medications   ondansetron (ZOFRAN) injection 4 mg (4 mg Intravenous Given 2/17/22 1224)   0.9% sodium chloride BOLUS (1,000 mLs Intravenous New Bag 2/17/22 1224)     Followed by   sodium chloride 0.9% infusion (has no administration in time range)   HYDROmorphone (PF) (DILAUDID) injection 0.5 mg (has no administration in time range)   lidocaine (viscous) (XYLOCAINE) 2 % 15 mL, alum & mag hydroxide-simethicone (MAALOX) 15 mL GI Cocktail (30 mLs Oral Given 2/17/22 1225)   HYDROmorphone (DILAUDID) injection 1 mg (1 mg Intravenous Given 2/17/22 1322)   iopamidol (ISOVUE-370) solution 200 mL (76 mLs Intravenous Given 2/17/22 1311)   new 100 ml saline bag (60 mLs Intravenous Given 2/17/22 1311)         Assessments & Plan (with Medical Decision Making)  Lida Blood is a 36 year old female who presented to the ED complaining of central abdominal pain that began this morning.  She had some issues with decreased appetite and nausea for the last couple days but otherwise no other symptoms.  On arrival to the ED her vital signs were within normal limits.  Exam demonstrated tenderness to the epigastric, periumbilical, and suprapubic abdomen with no rebound or guarding.  With her description of pain radiating into the back there is concern for potential gastritis/PUD or pancreatitis.  IV was established and labs are drawn for further evaluation.  She received a GI cocktail without relief so subsequently given fluids, Dilaudid, and Zofran with some improvement.  Labs today  showed actually a low white count of 3.6 with a negative CRP.  Her lipase and liver enzymes were all normal.  A CT scan of the abdomen/pelvis was obtained and unfortunately did not show a clear etiology for her central abdominal pain.  I did discuss these results with the patient.  Clinically, cause of her pain is unclear.  Discussed potentiality of gastritis and she was agreeable to adding Carafate to her regimen in addition to her daily omeprazole to see if that will help things.  Also discussed potential gas pain which CT did show some gas buildup and she was comfortable with trying some simethicone/Gas-X over-the-counter as well.  For severe breakthrough pain I will give her a few tablets of Norco, side effects discussed and advised use of MiraLAX to ensure no constipation will occur with this medication.  Advised discontinuing ibuprofen given concern for gastritis.  She was advised to contact her clinic provider to schedule close follow-up in the next few days for reassessment.  She was given instructions to return to the ED for any worsening concerns.  All questions were answered and patient was discharged home in suitable condition.     I have reviewed the nursing notes.    I have reviewed the findings, diagnosis, plan and need for follow up with the patient.    New Prescriptions    HYDROCODONE-ACETAMINOPHEN (NORCO) 5-325 MG TABLET    Take 1 tablet by mouth every 6 hours as needed for severe pain    SUCRALFATE (CARAFATE) 1 GM TABLET    Take 1 tablet (1 g) by mouth 4 times daily       Final diagnoses:   Central abdominal pain     Note: Chart documentation done in part with Dragon Voice Recognition software. Although reviewed after completion, some word and grammatical errors may remain.     2/17/2022   [facility] EMERGENCY DEPT     Dinora Agarwal PA-C  02/17/22 [id number]

## 2022-02-17 NOTE — Clinical Note
Lida Blood was seen and treated in our emergency department on 2/17/2022.  She may return to work on 02/19/2022.       If you have any questions or concerns, please don't hesitate to call.      Dinora Agarwal PA-C

## 2022-02-17 NOTE — DISCHARGE INSTRUCTIONS
Unfortunately we did not find a clear cause for your pain but work-up today was reassuring.    It is possible you have gastritis.  Take the Carafate as prescribed which should help with this.  Continue with your daily omeprazole at home as well.  Do not take ibuprofen because this can make gastritis worse    It is also possible you are having some gas/cramping colonic pain.  Over-the-counter Gas-X (simethicone) can help with this.    Try to reserve the use of Norco for severe breakthrough pain.  Take with MiraLAX to stay regular and avoid constipation.    Please follow-up with your clinic provider in the next few days for reassessment.  If you develop any worsening symptoms please return to the emergency department.    Thank you for choosing [facility]'s Emergency Department. It was a pleasure taking care of you today. If you have any questions, please call [phone].    Dinora Agarwal PA-C

## 2022-02-17 NOTE — ED TRIAGE NOTES
Pt reports she hasn't felt good for the past couple days and today woke up with sharp abd pain and nausea.

## 2022-10-09 ENCOUNTER — HEALTH MAINTENANCE LETTER (OUTPATIENT)
Age: 37
End: 2022-10-09

## 2023-02-18 ENCOUNTER — HEALTH MAINTENANCE LETTER (OUTPATIENT)
Age: 38
End: 2023-02-18

## 2023-10-28 ENCOUNTER — HEALTH MAINTENANCE LETTER (OUTPATIENT)
Age: 38
End: 2023-10-28

## 2024-03-16 ENCOUNTER — HEALTH MAINTENANCE LETTER (OUTPATIENT)
Age: 39
End: 2024-03-16

## 2024-12-21 ENCOUNTER — HEALTH MAINTENANCE LETTER (OUTPATIENT)
Age: 39
End: 2024-12-21

## 2025-06-15 NOTE — ED NOTES
Pt has abd pain, history of ulcers and states she vomited blood this morning.  Vomited four times today and is just bile now.  Usually waits it out and takes a lot of maalox but this is not helping today.   HTN (hypertension)

## (undated) DEVICE — SPLINT FIBERGLASS 4X30" PRE-CUT RESIN 76430

## (undated) DEVICE — CAST PADDING 4" WEBRIL UNSTERILE

## (undated) DEVICE — Device

## (undated) DEVICE — SU VICRYL 3-0 PS-2 27" UND J427H

## (undated) DEVICE — SU PROLENE 4-0 PS-2 18" 8682G

## (undated) DEVICE — SU VICRYL 4-0 PS-2 27" UND J426H

## (undated) DEVICE — BNDG COBAN 4"X5YDS STERILE

## (undated) DEVICE — DRSG GAUZE 4X4" TRAY

## (undated) DEVICE — SOL NACL 0.9% IRRIG 1000ML BOTTLE 07138-09

## (undated) DEVICE — BNDG COBAN 6"X5YDS STERILE

## (undated) DEVICE — SU PDS II 3-0 SH 27" Z316H

## (undated) DEVICE — PACK EXTREMITY SOP15EXFSD

## (undated) DEVICE — PREP CHLORAPREP 26ML TINTED ORANGE  260815

## (undated) DEVICE — GLOVE PROTEXIS W/NEU-THERA 8.0  2D73TE80

## (undated) RX ORDER — KETOROLAC TROMETHAMINE 30 MG/ML
INJECTION, SOLUTION INTRAMUSCULAR; INTRAVENOUS
Status: DISPENSED
Start: 2018-04-06

## (undated) RX ORDER — PROPOFOL 10 MG/ML
INJECTION, EMULSION INTRAVENOUS
Status: DISPENSED
Start: 2017-03-01

## (undated) RX ORDER — SCOLOPAMINE TRANSDERMAL SYSTEM 1 MG/1
PATCH, EXTENDED RELEASE TRANSDERMAL
Status: DISPENSED
Start: 2018-04-06

## (undated) RX ORDER — DEXAMETHASONE SODIUM PHOSPHATE 10 MG/ML
INJECTION INTRAMUSCULAR; INTRAVENOUS
Status: DISPENSED
Start: 2018-04-06

## (undated) RX ORDER — LIDOCAINE HYDROCHLORIDE 20 MG/ML
INJECTION, SOLUTION EPIDURAL; INFILTRATION; INTRACAUDAL; PERINEURAL
Status: DISPENSED
Start: 2018-04-06

## (undated) RX ORDER — FENTANYL CITRATE 50 UG/ML
INJECTION, SOLUTION INTRAMUSCULAR; INTRAVENOUS
Status: DISPENSED
Start: 2018-04-06

## (undated) RX ORDER — PROPOFOL 10 MG/ML
INJECTION, EMULSION INTRAVENOUS
Status: DISPENSED
Start: 2018-04-06

## (undated) RX ORDER — ACETAMINOPHEN 10 MG/ML
INJECTION, SOLUTION INTRAVENOUS
Status: DISPENSED
Start: 2018-04-06

## (undated) RX ORDER — ONDANSETRON 2 MG/ML
INJECTION INTRAMUSCULAR; INTRAVENOUS
Status: DISPENSED
Start: 2018-04-06

## (undated) RX ORDER — HYDROMORPHONE HYDROCHLORIDE 1 MG/ML
INJECTION, SOLUTION INTRAMUSCULAR; INTRAVENOUS; SUBCUTANEOUS
Status: DISPENSED
Start: 2018-04-06